# Patient Record
Sex: FEMALE | Race: WHITE | NOT HISPANIC OR LATINO | Employment: STUDENT | URBAN - METROPOLITAN AREA
[De-identification: names, ages, dates, MRNs, and addresses within clinical notes are randomized per-mention and may not be internally consistent; named-entity substitution may affect disease eponyms.]

---
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
## 2023-01-11 ENCOUNTER — HOSPITAL ENCOUNTER (EMERGENCY)
Facility: HOSPITAL | Age: 18
Discharge: HOME | End: 2023-01-11
Attending: EMERGENCY MEDICINE

## 2023-01-11 ENCOUNTER — TELEPHONE (OUTPATIENT)
Dept: PEDIATRICS CLINIC | Facility: CLINIC | Age: 18
End: 2023-01-11

## 2023-01-11 VITALS
RESPIRATION RATE: 20 BRPM | HEART RATE: 101 BPM | DIASTOLIC BLOOD PRESSURE: 63 MMHG | SYSTOLIC BLOOD PRESSURE: 103 MMHG | OXYGEN SATURATION: 97 %

## 2023-01-11 DIAGNOSIS — F40.10 SOCIAL ANXIETY DISORDER: ICD-10-CM

## 2023-01-11 DIAGNOSIS — Z76.0 MEDICATION REFILL: Primary | ICD-10-CM

## 2023-01-11 DIAGNOSIS — F31.81 BIPOLAR II DISORDER (HCC): ICD-10-CM

## 2023-01-11 DIAGNOSIS — F41.0 PANIC DISORDER: ICD-10-CM

## 2023-01-11 RX ORDER — ESCITALOPRAM OXALATE 10 MG/1
10 TABLET ORAL DAILY
Qty: 30 TABLET | Refills: 0 | Status: SHIPPED | OUTPATIENT
Start: 2023-01-11 | End: 2023-01-11 | Stop reason: SDUPTHER

## 2023-01-11 RX ORDER — ESCITALOPRAM OXALATE 10 MG/1
10 TABLET ORAL DAILY
Qty: 9 TABLET | Refills: 0 | Status: SHIPPED | OUTPATIENT
Start: 2023-01-11 | End: 2023-01-20

## 2023-01-11 NOTE — ED PROVIDER NOTES
History  Chief Complaint   Patient presents with   • Medication Refill     Needs refill for antidepressant, appt on 1/19, escitalopram 10mg daily     47-year-old female comes in for medication refill  Patient states she takes Lexapro 10 mg daily and that she ran out  She is setting up care with a new psychiatrist but does not have an appointment until the 19th  Her old psychiatrist discharged her from the practice in November and is unwilling to refill her scripts  Patient has no other complaints      History provided by:  Patient   used: No    Medication Refill  Medications/supplies requested:  Lexapro  Reason for request:  Medications ran out (Changing psychiatrists)  Medications taken before: yes - see home medications    Patient has complete original prescription information: yes        Prior to Admission Medications   Prescriptions Last Dose Informant Patient Reported? Taking? OLANZapine (ZyPREXA) 10 mg tablet   No No   Sig: Take 1 tablet (10 mg total) by mouth daily at bedtime   SODIUM FLUORIDE, DENTAL GEL, 1 1 % GEL   No No   Sig: Brush teeth using size of small pea every night - spit out toothpaste - do not rinse following brushing - use with adult supervision     escitalopram (Lexapro) 10 mg tablet   No No   Sig: Take 1 tablet (10 mg total) by mouth daily   escitalopram (Lexapro) 10 mg tablet   No Yes   Sig: Take 1 tablet (10 mg total) by mouth daily for 9 days   escitalopram (Lexapro) 10 mg tablet   No Yes   Sig: Take 1 tablet (10 mg total) by mouth daily for 9 days   ferrous sulfate 324 (65 Fe) mg   No No   Sig: Take 1 tablet (324 mg total) by mouth 2 (two) times a day before meals   hydrOXYzine pamoate (VISTARIL) 50 mg capsule   Yes No   traZODone (DESYREL) 50 mg tablet   No No   Sig: Take 1 tablet (50 mg total) by mouth daily at bedtime      Facility-Administered Medications: None       Past Medical History:   Diagnosis Date   • Anemia    • Anxiety    • Depression    • Frequent headaches 05/20/2021       No past surgical history on file  Family History   Problem Relation Age of Onset   • No Known Problems Mother    • No Known Problems Father      I have reviewed and agree with the history as documented  E-Cigarette/Vaping   • E-Cigarette Use Never User      E-Cigarette/Vaping Substances   • Nicotine No    • THC No    • CBD No    • Flavoring No    • Other No    • Unknown No      Social History     Tobacco Use   • Smoking status: Every Day     Packs/day: 0 50     Types: Cigarettes   • Smokeless tobacco: Never   • Tobacco comments:     Dealing with a lot of anxiety  Refer to [address] Use   • Vaping Use: Never used   Substance Use Topics   • Alcohol use: Never   • Drug use: Never       Review of Systems   Constitutional: Negative for chills and fever  HENT: Negative for ear pain and sore throat  Eyes: Negative for pain and visual disturbance  Respiratory: Negative for cough and shortness of breath  Cardiovascular: Negative for chest pain and palpitations  Gastrointestinal: Negative for abdominal pain and vomiting  Genitourinary: Negative for dysuria and hematuria  Musculoskeletal: Negative for arthralgias and back pain  Skin: Negative for color change and rash  Neurological: Negative for seizures and syncope  All other systems reviewed and are negative  Physical Exam  Physical Exam  Vitals and nursing note reviewed  Constitutional:       General: She is not in acute distress  Appearance: She is well-developed  HENT:      Head: Normocephalic and atraumatic  Eyes:      Conjunctiva/sclera: Conjunctivae normal    Cardiovascular:      Rate and Rhythm: Normal rate and regular rhythm  Heart sounds: No murmur heard  Pulmonary:      Effort: Pulmonary effort is normal  No respiratory distress  Breath sounds: Normal breath sounds  Abdominal:      Palpations: Abdomen is soft  Tenderness: There is no abdominal tenderness  Musculoskeletal:         General: No swelling  Cervical back: Neck supple  Skin:     General: Skin is warm and dry  Capillary Refill: Capillary refill takes less than 2 seconds  Neurological:      Mental Status: She is alert  Psychiatric:         Mood and Affect: Mood normal          Vital Signs  ED Triage Vitals   Temp Pulse Respirations Blood Pressure SpO2   -- 01/11/23 1732 01/11/23 1732 01/11/23 1732 01/11/23 1732    101 20 103/63 97 %      Temp Source Heart Rate Source Patient Position - Orthostatic VS BP Location FiO2 (%)   01/11/23 1731 01/11/23 1732 01/11/23 1732 01/11/23 1732 --   Oral Monitor Sitting Left arm       Pain Score       01/11/23 1732       No Pain           Vitals:    01/11/23 1732   BP: 103/63   Pulse: 101   Patient Position - Orthostatic VS: Sitting         Visual Acuity      ED Medications  Medications - No data to display    Diagnostic Studies  Results Reviewed     None                 No orders to display              Procedures  Procedures         ED Course                                             Medical Decision Making  Bipolar II disorder [facility]): chronic illness or injury  Medication refill: self-limited or minor problem  Panic disorder: chronic illness or injury  Social anxiety disorder: chronic illness or injury  Amount and/or Complexity of Data Reviewed  Independent Historian: parent     Details: Parent is here to corroborate patient's story that she takes her medications regularly and that she ran out and she did try to call her former psychiatrist who would not refill her medication   External Data Reviewed: notes  Details: Discharge from current psychiatrist 11 of 2022  Patient noted to have tried to call to get refills for her medication before her visit with new psychiatrist and they refused to refill because she was no longer patient our practice      Risk  Prescription drug management      Risk Details: Wrote prescription for Lexapro until 1/20/2023 discussed with patient and mother to keep her point with new psychiatrist and if she had anything other concern she could come back to the emergency department  Disposition  Final diagnoses:   Medication refill   Bipolar II disorder ([facility] [address] )   Social anxiety disorder   Panic disorder     Time reflects when diagnosis was documented in both MDM as applicable and the Disposition within this note     Time User Action Codes Description Comment    1/11/2023  5:41 PM Gabriella Kowalski K Add [Z76 0] Medication refill     1/11/2023  5:41 PM Shanna Fairbanks K Add [F31 81] Bipolar II disorder ([facility] [address] )     1/11/2023  5:41 PM Daija Ishikawa Add [F40 10] Social anxiety disorder     1/11/2023  5:41 PM Daija Ishikawa Add [F41 0] Panic disorder       ED Disposition     ED Disposition   Discharge    Condition   Stable    Date/Time   Wed Jan 11, 2023  5:41 PM    [facility] discharge to home/self care                 Follow-up Information     Follow up With Specialties Details Why Contact Info    Keep appointment with your psychiatrist              Discharge Medication List as of 1/11/2023  5:47 PM      CONTINUE these medications which have CHANGED    Details   escitalopram (Lexapro) 10 mg tablet Take 1 tablet (10 mg total) by mouth daily for 9 days, Starting Wed 1/11/2023, Until Fri 1/20/2023, Normal         CONTINUE these medications which have NOT CHANGED    Details   ferrous sulfate 324 (65 Fe) mg Take 1 tablet (324 mg total) by mouth 2 (two) times a day before meals, Starting Sat 5/15/2021, Normal      hydrOXYzine pamoate (VISTARIL) 50 mg capsule Starting Sat 11/6/2021, Historical Med      OLANZapine (ZyPREXA) 10 mg tablet Take 1 tablet (10 mg total) by mouth daily at bedtime, Starting Thu 9/22/2022, Until Wed 12/21/2022, Normal      SODIUM FLUORIDE, DENTAL GEL, 1 1 % GEL Brush teeth using size of small pea every night - spit out toothpaste - do not rinse following brushing - use with adult supervision , Normal traZODone (DESYREL) 50 mg tablet Take 1 tablet (50 mg total) by mouth daily at bedtime, Starting Thu 9/22/2022, Until Wed 12/21/2022, Normal             No discharge procedures on file      PDMP Review     None          ED Provider  Electronically Signed by           Suad Arboleda DO  01/11/23 1851

## 2023-01-19 ENCOUNTER — TELEPHONE (OUTPATIENT)
Dept: PSYCHIATRY | Facility: CLINIC | Age: 18
End: 2023-01-19

## 2023-01-19 NOTE — TELEPHONE ENCOUNTER
Pt mother called in to add pt to wait list for med mgmt  Pt was recently discharged from a provider but pt has since been having trouble sleeping and experiencing bouts of depression  Writer informed mom of wait list and to contact pcp for a referral for services  Writer also provided mom with the walk in clinic address and phone number as mom stated she feels she needs to take pt to the hospital and is concerned about her   Mom was appreciative of the information and will obtain the referral

## 2023-01-26 NOTE — TELEPHONE ENCOUNTER
01/26/23 1:00 PM     The office's request has been received, reviewed, and the patient chart updated  The PCP has successfully been removed with a patient attribution note  This message will now be completed      Thank you  Paolo Smith

## 2023-02-03 ENCOUNTER — HOSPITAL ENCOUNTER (EMERGENCY)
Facility: HOSPITAL | Age: 18
Discharge: HOME | End: 2023-02-03
Attending: EMERGENCY MEDICINE

## 2023-02-03 ENCOUNTER — APPOINTMENT (EMERGENCY)
Dept: RADIOLOGY | Facility: HOSPITAL | Age: 18
End: 2023-02-03

## 2023-02-03 VITALS
HEART RATE: 76 BPM | BODY MASS INDEX: 28.55 KG/M2 | HEIGHT: 61 IN | TEMPERATURE: 98.6 F | RESPIRATION RATE: 18 BRPM | OXYGEN SATURATION: 100 % | DIASTOLIC BLOOD PRESSURE: 58 MMHG | WEIGHT: 151.24 LBS | SYSTOLIC BLOOD PRESSURE: 101 MMHG

## 2023-02-03 DIAGNOSIS — R11.2 NAUSEA AND VOMITING: Primary | ICD-10-CM

## 2023-02-03 LAB
ABO GROUP BLD: NORMAL
ALBUMIN SERPL BCP-MCNC: 4.3 G/DL (ref 3.5–5)
ALP SERPL-CCNC: 62 U/L (ref 34–104)
ALT SERPL W P-5'-P-CCNC: 10 U/L (ref 7–52)
ANION GAP SERPL CALCULATED.3IONS-SCNC: 6 MMOL/L (ref 4–13)
APTT PPP: 31 SECONDS (ref 23–37)
AST SERPL W P-5'-P-CCNC: 11 U/L (ref 13–39)
BASOPHILS # BLD AUTO: 0.04 THOUSANDS/ÂΜL (ref 0–0.1)
BASOPHILS NFR BLD AUTO: 0 % (ref 0–1)
BILIRUB SERPL-MCNC: 0.61 MG/DL (ref 0.2–1)
BLD GP AB SCN SERPL QL: NEGATIVE
BUN SERPL-MCNC: 7 MG/DL (ref 5–25)
CALCIUM SERPL-MCNC: 9.3 MG/DL (ref 8.4–10.2)
CHLORIDE SERPL-SCNC: 105 MMOL/L (ref 96–108)
CO2 SERPL-SCNC: 24 MMOL/L (ref 21–32)
CREAT SERPL-MCNC: 0.6 MG/DL (ref 0.6–1.3)
EOSINOPHIL # BLD AUTO: 0.11 THOUSAND/ÂΜL (ref 0–0.61)
EOSINOPHIL NFR BLD AUTO: 1 % (ref 0–6)
ERYTHROCYTE [DISTWIDTH] IN BLOOD BY AUTOMATED COUNT: 14.6 % (ref 11.6–15.1)
GFR SERPL CREATININE-BSD FRML MDRD: 132 ML/MIN/1.73SQ M
GLUCOSE SERPL-MCNC: 84 MG/DL (ref 65–140)
HCT VFR BLD AUTO: 33.3 % (ref 34.8–46.1)
HGB BLD-MCNC: 10 G/DL (ref 11.5–15.4)
IMM GRANULOCYTES # BLD AUTO: 0.03 THOUSAND/UL (ref 0–0.2)
IMM GRANULOCYTES NFR BLD AUTO: 0 % (ref 0–2)
INR PPP: 1.03 (ref 0.84–1.19)
LIPASE SERPL-CCNC: 6 U/L (ref 11–82)
LYMPHOCYTES # BLD AUTO: 2.19 THOUSANDS/ÂΜL (ref 0.6–4.47)
LYMPHOCYTES NFR BLD AUTO: 24 % (ref 14–44)
MCH RBC QN AUTO: 22.6 PG (ref 26.8–34.3)
MCHC RBC AUTO-ENTMCNC: 30 G/DL (ref 31.4–37.4)
MCV RBC AUTO: 75 FL (ref 82–98)
MONOCYTES # BLD AUTO: 0.96 THOUSAND/ÂΜL (ref 0.17–1.22)
MONOCYTES NFR BLD AUTO: 10 % (ref 4–12)
NEUTROPHILS # BLD AUTO: 5.88 THOUSANDS/ÂΜL (ref 1.85–7.62)
NEUTS SEG NFR BLD AUTO: 65 % (ref 43–75)
NRBC BLD AUTO-RTO: 0 /100 WBCS
PLATELET # BLD AUTO: 330 THOUSANDS/UL (ref 149–390)
PMV BLD AUTO: 10.2 FL (ref 8.9–12.7)
POTASSIUM SERPL-SCNC: 3.6 MMOL/L (ref 3.5–5.3)
PROT SERPL-MCNC: 7.3 G/DL (ref 6.4–8.4)
PROTHROMBIN TIME: 13.7 SECONDS (ref 11.6–14.5)
RBC # BLD AUTO: 4.42 MILLION/UL (ref 3.81–5.12)
RH BLD: POSITIVE
SODIUM SERPL-SCNC: 135 MMOL/L (ref 135–147)
SPECIMEN EXPIRATION DATE: NORMAL
WBC # BLD AUTO: 9.21 THOUSAND/UL (ref 4.31–10.16)

## 2023-02-03 RX ORDER — ONDANSETRON 4 MG/1
4 TABLET, ORALLY DISINTEGRATING ORAL EVERY 8 HOURS PRN
Qty: 20 TABLET | Refills: 0 | Status: SHIPPED | OUTPATIENT
Start: 2023-02-03 | End: 2023-02-10

## 2023-02-03 RX ORDER — ONDANSETRON 2 MG/ML
4 INJECTION INTRAMUSCULAR; INTRAVENOUS ONCE
Status: COMPLETED | OUTPATIENT
Start: 2023-02-03 | End: 2023-02-03

## 2023-02-03 RX ORDER — PANTOPRAZOLE SODIUM 40 MG/10ML
40 INJECTION, POWDER, LYOPHILIZED, FOR SOLUTION INTRAVENOUS ONCE
Status: COMPLETED | OUTPATIENT
Start: 2023-02-03 | End: 2023-02-03

## 2023-02-03 RX ADMIN — ONDANSETRON 4 MG: 2 INJECTION INTRAMUSCULAR; INTRAVENOUS at 22:07

## 2023-02-03 RX ADMIN — SODIUM CHLORIDE 1000 ML: 0.9 INJECTION, SOLUTION INTRAVENOUS at 22:14

## 2023-02-03 RX ADMIN — PANTOPRAZOLE SODIUM 40 MG: 40 INJECTION, POWDER, FOR SOLUTION INTRAVENOUS at 22:11

## 2023-02-04 LAB
ATRIAL RATE: 104 BPM
FERRITIN SERPL-MCNC: 2 NG/ML (ref 8–388)
IRON SATN MFR SERPL: 6 % (ref 15–50)
IRON SERPL-MCNC: 26 UG/DL (ref 50–170)
P AXIS: 64 DEGREES
PR INTERVAL: 136 MS
QRS AXIS: 89 DEGREES
QRSD INTERVAL: 84 MS
QT INTERVAL: 316 MS
QTC INTERVAL: 415 MS
T WAVE AXIS: 43 DEGREES
TIBC SERPL-MCNC: 465 UG/DL (ref 250–450)
VENTRICULAR RATE: 104 BPM

## 2023-02-04 NOTE — ED PROVIDER NOTES
History  Chief Complaint   Patient presents with   • Vomiting     Pt complains of vomiting for the last 2 months  Pt was vomiting today and mother states that she saw dark red at the end of the vomiting episode  Cortes Braxton is a 21year old female presenting for evaluation after she had an episode of hematemesis  Her mother is present and helped provide some of her history  They describe that the patient has had intermittent episodes of vomiting over the last several weeks, she is also been complaining of reflux and epigastric abdominal discomfort  She has been taking Tums and over-the-counter antacids without relief  She has previously had streaks of blood in her vomit, however today she had an episode of dark red vomit, she denies eating any red foods or drinking any red drinks today  She denies any significant epigastric discomfort at this time  No recent dark or bloody stools  She has a history of anemia from unknown cause that required blood transfusion  Patient does endorse taking NSAIDs daily for dental pain  History provided by:  Patient and relative (Mother)   used: No    Vomiting  Associated symptoms: no abdominal pain, no arthralgias, no chills, no cough, no fever and no sore throat        Prior to Admission Medications   Prescriptions Last Dose Informant Patient Reported? Taking? OLANZapine (ZyPREXA) 10 mg tablet   No No   Sig: Take 1 tablet (10 mg total) by mouth daily at bedtime   SODIUM FLUORIDE, DENTAL GEL, 1 1 % GEL   No No   Sig: Brush teeth using size of small pea every night - spit out toothpaste - do not rinse following brushing - use with adult supervision     escitalopram (Lexapro) 10 mg tablet   No No   Sig: Take 1 tablet (10 mg total) by mouth daily for 9 days   ferrous sulfate 324 (65 Fe) mg   No No   Sig: Take 1 tablet (324 mg total) by mouth 2 (two) times a day before meals   hydrOXYzine pamoate (VISTARIL) 50 mg capsule   Yes No   traZODone (DESYREL) 50 mg tablet   No No   Sig: Take 1 tablet (50 mg total) by mouth daily at bedtime      Facility-Administered Medications: None       Past Medical History:   Diagnosis Date   • Anemia    • Anxiety    • Depression    • Frequent headaches 05/20/2021       History reviewed  No pertinent surgical history  Family History   Problem Relation Age of Onset   • No Known Problems Mother    • No Known Problems Father      I have reviewed and agree with the history as documented  E-Cigarette/Vaping   • E-Cigarette Use Never User      E-Cigarette/Vaping Substances   • Nicotine No    • THC No    • CBD No    • Flavoring No    • Other No    • Unknown No      Social History     Tobacco Use   • Smoking status: Every Day     Packs/day: 1 00     Types: Cigarettes   • Smokeless tobacco: Never   • Tobacco comments:     Dealing with a lot of anxiety  Refer to [address] Use   • Vaping Use: Never used   Substance Use Topics   • Alcohol use: Never   • Drug use: Never        Review of Systems   Constitutional: Negative for chills and fever  HENT: Negative for ear pain and sore throat  Eyes: Negative for pain and visual disturbance  Respiratory: Negative for cough and shortness of breath  Cardiovascular: Negative for chest pain and palpitations  Gastrointestinal: Positive for nausea and vomiting  Negative for abdominal pain  Vomiting blood   Genitourinary: Negative for dysuria and hematuria  Musculoskeletal: Negative for arthralgias and back pain  Skin: Negative for color change and rash  Neurological: Negative for seizures and syncope  All other systems reviewed and are negative        Physical Exam  ED Triage Vitals [02/03/23 2043]   Temperature Pulse Respirations Blood Pressure SpO2   98 6 °F (37 °C) (!) 115 18 132/80 97 %      Temp Source Heart Rate Source Patient Position - Orthostatic VS BP Location FiO2 (%)   Oral -- Lying Right arm --      Pain Score       --             Orthostatic Vital Signs  Vitals:    02/03/23 2043   BP: 132/80   Pulse: (!) 115   Patient Position - Orthostatic VS: Lying       Physical Exam  Vitals and nursing note reviewed  Constitutional:       General: She is not in acute distress  Appearance: Normal appearance  HENT:      Head: Normocephalic and atraumatic  Mouth/Throat:      Mouth: Mucous membranes are moist       Pharynx: Oropharynx is clear  Eyes:      General: No scleral icterus  Conjunctiva/sclera: Conjunctivae normal    Cardiovascular:      Rate and Rhythm: Normal rate and regular rhythm  Heart sounds: Normal heart sounds  Pulmonary:      Effort: Pulmonary effort is normal  No respiratory distress  Breath sounds: Normal breath sounds  Abdominal:      General: Abdomen is flat  There is no distension  Palpations: Abdomen is soft  Tenderness: There is no abdominal tenderness  There is no guarding or rebound  Musculoskeletal:         General: No tenderness or signs of injury  Cervical back: Neck supple  No rigidity  Skin:     General: Skin is warm  Coloration: Skin is not jaundiced  Findings: No erythema or rash  Neurological:      General: No focal deficit present  Mental Status: She is alert  Mental status is at baseline  Psychiatric:         Mood and Affect: Mood normal          Behavior: Behavior normal          ED Medications  Medications   sodium chloride 0 9 % bolus 1,000 mL (1,000 mL Intravenous New Bag 2/3/23 2214)   ondansetron (ZOFRAN) injection 4 mg (4 mg Intravenous Given 2/3/23 2207)   pantoprazole (PROTONIX) injection 40 mg (40 mg Intravenous Given 2/3/23 2211)       Diagnostic Studies  Results Reviewed     Procedure Component Value Units Date/Time    Iron Saturation % [[id number]] Collected: 02/03/23 2214    Lab Status: In process Specimen: Blood from Arm, Right Updated: 02/03/23 2257    Ferritin [[id number]] Collected: 02/03/23 2214    Lab Status:  In process Specimen: Blood from Arm, Right Updated: 02/03/23 2257    Comprehensive metabolic panel [[id number]]  (Abnormal) Collected: 02/03/23 2214    Lab Status: Final result Specimen: Blood from Arm, Right Updated: 02/03/23 2246     Sodium 135 mmol/L      Potassium 3 6 mmol/L      Chloride 105 mmol/L      CO2 24 mmol/L      ANION GAP 6 mmol/L      BUN 7 mg/dL      Creatinine 0 60 mg/dL      Glucose 84 mg/dL      Calcium 9 3 mg/dL      AST 11 U/L      ALT 10 U/L      Alkaline Phosphatase 62 U/L      Total Protein 7 3 g/dL      Albumin 4 3 g/dL      Total Bilirubin 0 61 mg/dL      eGFR 132 ml/min/1 73sq m     Narrative:      [place] guidelines for Chronic Kidney Disease (CKD):   •  Stage 1 with normal or high GFR (GFR > 90 mL/min/1 73 square meters)  •  Stage 2 Mild CKD (GFR = 60-89 mL/min/1 73 square meters)  •  Stage 3A Moderate CKD (GFR = 45-59 mL/min/1 73 square meters)  •  Stage 3B Moderate CKD (GFR = 30-44 mL/min/1 73 square meters)  •  Stage 4 Severe CKD (GFR = 15-29 mL/min/1 73 square meters)  •  Stage 5 End Stage CKD (GFR <15 mL/min/1 73 square meters)  Note: GFR calculation is accurate only with a steady state creatinine    Lipase [[id number]]  (Abnormal) Collected: 02/03/23 2214    Lab Status: Final result Specimen: Blood from Arm, Right Updated: 02/03/23 2246     Lipase 6 u/L     Protime-INR [[id number]]  (Normal) Collected: 02/03/23 2214    Lab Status: Final result Specimen: Blood from Arm, Left Updated: 02/03/23 2239     Protime 13 7 seconds      INR 1 03    APTT [[id number]]  (Normal) Collected: 02/03/23 2214    Lab Status: Final result Specimen: Blood from Arm, Left Updated: 02/03/23 2239     PTT 31 seconds     CBC and differential [[id number]]  (Abnormal) Collected: 02/03/23 2214    Lab Status: Final result Specimen: Blood from Arm, Right Updated: 02/03/23 2226     WBC 9 21 Thousand/uL      RBC 4 42 Million/uL      Hemoglobin 10 0 g/dL      Hematocrit 33 3 %      MCV 75 fL      MCH 22 6 pg      MCHC 30 0 g/dL      RDW 14 6 %      MPV 10 2 fL      Platelets 781 Thousands/uL      nRBC 0 /100 WBCs      Neutrophils Relative 65 %      Immat GRANS % 0 %      Lymphocytes Relative 24 %      Monocytes Relative 10 %      Eosinophils Relative 1 %      Basophils Relative 0 %      Neutrophils Absolute 5 88 Thousands/µL      Immature Grans Absolute 0 03 Thousand/uL      Lymphocytes Absolute 2 19 Thousands/µL      Monocytes Absolute 0 96 Thousand/µL      Eosinophils Absolute 0 11 Thousand/µL      Basophils Absolute 0 04 Thousands/µL                  XR chest 1 view portable   ED Interpretation by Nicole Grider DO (02/03 2251)   No acute abnormalities visualized            Procedures  Procedures      ED Course                                       Medical Decision Making  Shahrzad Crane is a 21year old female presenting for evaluation after she had an episode of hematemesis  They describe that she has had intermittent vomiting over the last several weeks, today she had an episode of dark red vomitus  She denies any abdominal tenderness at this time, but has had intermittent epigastric tenderness  Patient was not in any significant distress on examination, no abdominal tenderness, no active vomiting in the emergency department  Vital signs were unremarkable  Concern for possible peptic ulcer disease, possible Yue-Solorio tear  Patient's blood work was unremarkable, hemoglobin level was at her baseline at 10  Rest of her blood work was unremarkable  Chest x-ray was unremarkable, no evidence of free air or pneumomediastinum  Patient was given a dose of Protonix and Zofran here in the emergency department  Patient advised to schedule a follow-up appointment with her family medicine doctor, patient discharged with a prescription of Zofran for persistent nausea and vomiting, I advised that they discuss possible GI follow-up with her family doctor, gave strict return precautions, they were agreeable to plan      Nausea and vomiting: acute illness or injury  Amount and/or Complexity of Data Reviewed  Labs: ordered  Radiology: ordered and independent interpretation performed  Risk  Prescription drug management  Disposition  Final diagnoses:   Nausea and vomiting     Time reflects when diagnosis was documented in both MDM as applicable and the Disposition within this note     Time User Action Codes Description Comment    2/3/2023 10:55 PM Lilly Otero Add [R11 2] Nausea and vomiting       ED Disposition     ED Disposition   Discharge    Condition   Stable    Date/Time   Fri Feb 3, 2023 10:55 PM    [facility] discharge to home/self care  Follow-up Information     Follow up With Specialties Details Why Contact Info [facility] Schedule an appointment as soon as possible for a visit   [address] [address]  [id number]  , [place], [place], [address]          Patient's Medications   Discharge Prescriptions    ONDANSETRON (ZOFRAN-ODT) 4 MG DISINTEGRATING TABLET    Take 1 tablet (4 mg total) by mouth every 8 (eight) hours as needed for nausea or vomiting for up to 7 days       Start Date: 2/3/2023  End Date: 2/10/2023       Order Dose: 4 mg       Quantity: 20 tablet    Refills: 0     No discharge procedures on file  PDMP Review     None           ED Provider  Attending physically available and evaluated Papikristi Ahujah  I managed the patient along with the ED Attending      Electronically Signed by         Howie Mendoza DO  02/03/23 3179 clear bilaterally.  Pupils equal, round, and reactive to light.

## 2023-08-20 NOTE — ED NOTES
Spoke to patients mother via phone, gave verbal consent to treat patient  Mother is on her way here        Durga Barrett RN  06/15/21 8221  used

## 2023-10-03 ENCOUNTER — HOSPITAL ENCOUNTER (EMERGENCY)
Facility: HOSPITAL | Age: 18
Discharge: HOME | End: 2023-10-03
Attending: EMERGENCY MEDICINE
Payer: COMMERCIAL

## 2023-10-03 VITALS
TEMPERATURE: 98.8 F | SYSTOLIC BLOOD PRESSURE: 120 MMHG | RESPIRATION RATE: 18 BRPM | DIASTOLIC BLOOD PRESSURE: 65 MMHG | HEART RATE: 87 BPM | OXYGEN SATURATION: 97 %

## 2023-10-03 DIAGNOSIS — D64.9 ANEMIA: ICD-10-CM

## 2023-10-03 DIAGNOSIS — K92.0 HEMATEMESIS: ICD-10-CM

## 2023-10-03 DIAGNOSIS — K92.2 UPPER GI BLEED: Primary | ICD-10-CM

## 2023-10-03 LAB
ABO GROUP BLD: NORMAL
ALBUMIN SERPL BCP-MCNC: 4.9 G/DL (ref 3.5–5)
ALP SERPL-CCNC: 62 U/L (ref 34–104)
ALT SERPL W P-5'-P-CCNC: 9 U/L (ref 7–52)
ANION GAP SERPL CALCULATED.3IONS-SCNC: 8 MMOL/L
AST SERPL W P-5'-P-CCNC: 11 U/L (ref 13–39)
BASOPHILS # BLD AUTO: 0.04 THOUSANDS/ÂΜL (ref 0–0.1)
BASOPHILS NFR BLD AUTO: 0 % (ref 0–1)
BILIRUB SERPL-MCNC: 0.83 MG/DL (ref 0.2–1)
BLD GP AB SCN SERPL QL: NEGATIVE
BUN SERPL-MCNC: 8 MG/DL (ref 5–25)
CALCIUM SERPL-MCNC: 9.4 MG/DL (ref 8.4–10.2)
CHLORIDE SERPL-SCNC: 104 MMOL/L (ref 96–108)
CO2 SERPL-SCNC: 25 MMOL/L (ref 21–32)
CREAT SERPL-MCNC: 0.66 MG/DL (ref 0.6–1.3)
EOSINOPHIL # BLD AUTO: 0.04 THOUSAND/ÂΜL (ref 0–0.61)
EOSINOPHIL NFR BLD AUTO: 0 % (ref 0–6)
ERYTHROCYTE [DISTWIDTH] IN BLOOD BY AUTOMATED COUNT: 14.9 % (ref 11.6–15.1)
GFR SERPL CREATININE-BSD FRML MDRD: 128 ML/MIN/1.73SQ M
GLUCOSE SERPL-MCNC: 91 MG/DL (ref 65–140)
HCT VFR BLD AUTO: 34.4 % (ref 34.8–46.1)
HGB BLD-MCNC: 10.2 G/DL (ref 11.5–15.4)
IMM GRANULOCYTES # BLD AUTO: 0.05 THOUSAND/UL (ref 0–0.2)
IMM GRANULOCYTES NFR BLD AUTO: 0 % (ref 0–2)
LIPASE SERPL-CCNC: <6 U/L (ref 11–82)
LYMPHOCYTES # BLD AUTO: 2.16 THOUSANDS/ÂΜL (ref 0.6–4.47)
LYMPHOCYTES NFR BLD AUTO: 19 % (ref 14–44)
MCH RBC QN AUTO: 22.7 PG (ref 26.8–34.3)
MCHC RBC AUTO-ENTMCNC: 29.7 G/DL (ref 31.4–37.4)
MCV RBC AUTO: 77 FL (ref 82–98)
MONOCYTES # BLD AUTO: 0.9 THOUSAND/ÂΜL (ref 0.17–1.22)
MONOCYTES NFR BLD AUTO: 8 % (ref 4–12)
NEUTROPHILS # BLD AUTO: 8.08 THOUSANDS/ÂΜL (ref 1.85–7.62)
NEUTS SEG NFR BLD AUTO: 73 % (ref 43–75)
NRBC BLD AUTO-RTO: 0 /100 WBCS
PLATELET # BLD AUTO: 295 THOUSANDS/UL (ref 149–390)
PMV BLD AUTO: 9.9 FL (ref 8.9–12.7)
POTASSIUM SERPL-SCNC: 3.7 MMOL/L (ref 3.5–5.3)
PROT SERPL-MCNC: 7.8 G/DL (ref 6.4–8.4)
RBC # BLD AUTO: 4.49 MILLION/UL (ref 3.81–5.12)
RH BLD: POSITIVE
SODIUM SERPL-SCNC: 137 MMOL/L (ref 135–147)
SPECIMEN EXPIRATION DATE: NORMAL
WBC # BLD AUTO: 11.27 THOUSAND/UL (ref 4.31–10.16)

## 2023-10-03 PROCEDURE — 99285 EMERGENCY DEPT VISIT HI MDM: CPT | Performed by: EMERGENCY MEDICINE

## 2023-10-03 PROCEDURE — 86850 RBC ANTIBODY SCREEN: CPT | Performed by: EMERGENCY MEDICINE

## 2023-10-03 PROCEDURE — 85025 COMPLETE CBC W/AUTO DIFF WBC: CPT | Performed by: EMERGENCY MEDICINE

## 2023-10-03 PROCEDURE — 99284 EMERGENCY DEPT VISIT MOD MDM: CPT

## 2023-10-03 PROCEDURE — 86900 BLOOD TYPING SEROLOGIC ABO: CPT | Performed by: EMERGENCY MEDICINE

## 2023-10-03 PROCEDURE — 86901 BLOOD TYPING SEROLOGIC RH(D): CPT | Performed by: EMERGENCY MEDICINE

## 2023-10-03 PROCEDURE — 80053 COMPREHEN METABOLIC PANEL: CPT | Performed by: EMERGENCY MEDICINE

## 2023-10-03 PROCEDURE — 36415 COLL VENOUS BLD VENIPUNCTURE: CPT | Performed by: EMERGENCY MEDICINE

## 2023-10-03 PROCEDURE — 83690 ASSAY OF LIPASE: CPT | Performed by: EMERGENCY MEDICINE

## 2023-10-03 RX ORDER — FAMOTIDINE 20 MG/1
20 TABLET, FILM COATED ORAL ONCE
Status: COMPLETED | OUTPATIENT
Start: 2023-10-03 | End: 2023-10-03

## 2023-10-03 RX ORDER — SUCRALFATE ORAL 1 G/10ML
1 SUSPENSION ORAL ONCE
Status: DISCONTINUED | OUTPATIENT
Start: 2023-10-03 | End: 2023-10-03

## 2023-10-03 RX ORDER — SUCRALFATE 1 G/1
1 TABLET ORAL ONCE
Status: COMPLETED | OUTPATIENT
Start: 2023-10-03 | End: 2023-10-03

## 2023-10-03 RX ORDER — FERROUS SULFATE 325(65) MG
325 TABLET ORAL DAILY
Qty: 30 TABLET | Refills: 0 | Status: SHIPPED | OUTPATIENT
Start: 2023-10-03

## 2023-10-03 RX ADMIN — SUCRALFATE 1 G: 1 TABLET ORAL at 19:54

## 2023-10-03 RX ADMIN — FAMOTIDINE 20 MG: 20 TABLET ORAL at 19:53

## 2023-10-03 NOTE — ED PROVIDER NOTES
History  Chief Complaint   Patient presents with   • Vomiting Blood     Patient reports vomiting for the last few months, worsening and becoming more often, every day or other day. Today patient had one episode of vomiting blood. The patient is a 21 year olf female who presents with complaint of hematemesis. Pt states today she had an episode of vomiting with a "glob" of bright red blood. She states she has been having increasingly frequent episodes of vomiting for a few months and that vomiting is occurring more days of the week than not now. She has noticed some streaks or flecks of blood in the vomit recently but today was a larger amount. She was seen in the ED several months back but she has not had insurance for a while so she has not been able to seek further work up. She has used Tums, prevacid, and pepto without much improvement of sx. She states she has noticed some heart burn with the vomiting and has noticed overeating and certain foods like elisabeth and tomatoes make it worse. She has attempted an alkaline diet but was not able to stick with it very long. She notes history of anemia and was previously on iron supplements but no longer has a prescription for them. Denies black or bloody stools, chest pain, SOB          Prior to Admission Medications   Prescriptions Last Dose Informant Patient Reported? Taking? OLANZapine (ZyPREXA) 10 mg tablet   No No   Sig: Take 1 tablet (10 mg total) by mouth daily at bedtime   SODIUM FLUORIDE, DENTAL GEL, 1.1 % GEL   No No   Sig: Brush teeth using size of small pea every night - spit out toothpaste - do not rinse following brushing - use with adult supervision.    escitalopram (Lexapro) 10 mg tablet   No No   Sig: Take 1 tablet (10 mg total) by mouth daily for 9 days   ferrous sulfate 324 (65 Fe) mg   No No   Sig: Take 1 tablet (324 mg total) by mouth 2 (two) times a day before meals   hydrOXYzine pamoate (VISTARIL) 50 mg capsule   Yes No   ondansetron (ZOFRAN-ODT) 4 mg disintegrating tablet   No No   Sig: Take 1 tablet (4 mg total) by mouth every 8 (eight) hours as needed for nausea or vomiting for up to 7 days   traZODone (DESYREL) 50 mg tablet   No No   Sig: Take 1 tablet (50 mg total) by mouth daily at bedtime      Facility-Administered Medications: None       Past Medical History:   Diagnosis Date   • Anemia    • Anxiety    • Depression    • Frequent headaches 05/20/2021       History reviewed. No pertinent surgical history. Family History   Problem Relation Age of Onset   • No Known Problems Mother    • No Known Problems Father      I have reviewed and agree with the history as documented. E-Cigarette/Vaping   • E-Cigarette Use Never User      E-Cigarette/Vaping Substances   • Nicotine No    • THC No    • CBD No    • Flavoring No    • Other No    • Unknown No      Social History     Tobacco Use   • Smoking status: Every Day     Packs/day: 1.00     Types: Cigarettes   • Smokeless tobacco: Never   • Tobacco comments:     Dealing with a lot of anxiety. Refer to [address] [place] [address] Dr Use   • Vaping Use: Never used   Substance Use Topics   • Alcohol use: Never   • Drug use: Never        Review of Systems   Constitutional: Negative for chills and fever. HENT: Negative for congestion, rhinorrhea, sore throat and trouble swallowing. Eyes: Negative for discharge and visual disturbance. Respiratory: Positive for cough. Negative for chest tightness and shortness of breath. Cardiovascular: Negative for chest pain and leg swelling. Gastrointestinal: Positive for nausea and vomiting. Negative for abdominal pain, blood in stool and diarrhea. Genitourinary: Negative for difficulty urinating, dyspareunia, hematuria, menstrual problem and vaginal bleeding. Musculoskeletal: Negative for joint swelling and myalgias. Skin: Positive for pallor. Negative for wound. Allergic/Immunologic: Negative for environmental allergies and food allergies.    Neurological: Positive for light-headedness. Negative for syncope, weakness, numbness and headaches. Physical Exam  ED Triage Vitals [10/03/23 1755]   Temperature Pulse Respirations Blood Pressure SpO2   98.8 °F (37.1 °C) 87 18 120/65 97 %      Temp Source Heart Rate Source Patient Position - Orthostatic VS BP Location FiO2 (%)   Oral Monitor Sitting Right arm --      Pain Score       --             Orthostatic Vital Signs  Vitals:    10/03/23 1755   BP: 120/65   Pulse: 87   Patient Position - Orthostatic VS: Sitting       Physical Exam  Vitals and nursing note reviewed. Constitutional:       General: She is not in acute distress. Appearance: She is not toxic-appearing. HENT:      Head: Normocephalic and atraumatic. Mouth/Throat:      Mouth: Mucous membranes are moist.      Pharynx: Oropharynx is clear. Cardiovascular:      Rate and Rhythm: Normal rate and regular rhythm. Pulses: Normal pulses. Heart sounds: Normal heart sounds. Pulmonary:      Effort: Pulmonary effort is normal. No respiratory distress. Breath sounds: Normal breath sounds. No wheezing. Abdominal:      General: Abdomen is flat. Bowel sounds are normal.      Palpations: Abdomen is soft. Tenderness: There is no abdominal tenderness. Musculoskeletal:         General: No swelling or tenderness. Normal range of motion. Right lower leg: No edema. Left lower leg: No edema. Skin:     General: Skin is warm and dry. Coloration: Skin is pale. Neurological:      General: No focal deficit present. Mental Status: She is alert and oriented to person, place, and time.    Psychiatric:         Behavior: Behavior normal.         ED Medications  Medications   famotidine (PEPCID) tablet 20 mg (20 mg Oral Given 10/3/23 1953)   sucralfate (CARAFATE) tablet 1 g (1 g Oral Given 10/3/23 1954)       Diagnostic Studies  Results Reviewed     Procedure Component Value Units Date/Time    Comprehensive metabolic panel [[id number]]  (Abnormal) Collected: 10/03/23 1857    Lab Status: Final result Specimen: Blood from Arm, Left Updated: 10/03/23 1930     Sodium 137 mmol/L      Potassium 3.7 mmol/L      Chloride 104 mmol/L      CO2 25 mmol/L      ANION GAP 8 mmol/L      BUN 8 mg/dL      Creatinine 0.66 mg/dL      Glucose 91 mg/dL      Calcium 9.4 mg/dL      AST 11 U/L      ALT 9 U/L      Alkaline Phosphatase 62 U/L      Total Protein 7.8 g/dL      Albumin 4.9 g/dL      Total Bilirubin 0.83 mg/dL      eGFR 128 ml/min/1.73sq m     Narrative:      [facility] guidelines for Chronic Kid[facility] Disease (CKD):   •  Stage 1 with normal or high GFR (GFR > 90 mL/min/1.73 square meters)  •  Stage 2 Mild CKD (GFR = 60-89 mL/min/1.73 square meters)  •  Stage 3A Moderate CKD (GFR = 45-59 mL/min/1.73 square meters)  •  Stage 3B Moderate CKD (GFR = 30-44 mL/min/1.73 square meters)  •  Stage 4 Severe CKD (GFR = 15-29 mL/min/1.73 square meters)  •  Stage 5 End Stage CKD (GFR <15 mL/min/1.73 square meters)  Note: GFR calculation is accurate only with a steady state creatinine    Lipase [[id number]]  (Abnormal) Collected: 10/03/23 1857    Lab Status: Final result Specimen: Blood from Arm, Left Updated: 10/03/23 1930     Lipase <6 u/L     CBC and differential [[id number]]  (Abnormal) Collected: 10/03/23 1857    Lab Status: Final result Specimen: Blood from Arm, Left Updated: 10/03/23 1912     WBC 11.27 Thousand/uL      RBC 4.49 Million/uL      Hemoglobin 10.2 g/dL      Hematocrit 34.4 %      MCV 77 fL      MCH 22.7 pg      MCHC 29.7 g/dL      RDW 14.9 %      MPV 9.9 fL      Platelets 783 Thousands/uL      nRBC 0 /100 WBCs      Neutrophils Relative 73 %      Immat GRANS % 0 %      Lymphocytes Relative 19 %      Monocytes Relative 8 %      Eosinophils Relative 0 %      Basophils Relative 0 %      Neutrophils Absolute 8.08 Thousands/µL      Immature Grans Absolute 0.05 Thousand/uL      Lymphocytes Absolute 2.16 Thousands/µL      Monocytes Absolute 0.90 Thousand/µL      Eosinophils Absolute 0.04 Thousand/µL      Basophils Absolute 0.04 Thousands/µL                  No orders to display         Procedures  Procedures      ED Course  ED Course as of 10/03/23 2216   Tue Oct 03, 2023   1930 Hemoglobin(!): 10.2  HGB stable from 8 mos ago   1935 Lipase(!)  Stable from 8 months ago   1936 Comprehensive metabolic panel(!)  WNL, AST stable from 8 mos ago. CRAFFT    Flowsheet Row Most Recent Value   CRAFFT Initial Screen: During the past 12 months, did you:    1. Drink any alcohol (more than a few sips)? No Filed at: 10/03/2023 2009   2. Smoke any marijuana or hashish No Filed at: 10/03/2023 2009   3. Use anything else to get high? ("anything else" includes illegal drugs, over the counter and prescription drugs, and things that you sniff or 'huber')? No Filed at: 10/03/2023 2009                                    Medical Decision Making  DDx: Anemia, GERD, gastric ulcer, duodenal ulcer, pancreatitis    Amount and/or Complexity of Data Reviewed  Independent Historian: parent  External Data Reviewed: labs, radiology and notes. Labs: ordered. Decision-making details documented in ED Course. Risk  OTC drugs. Prescription drug management. Disposition  Final diagnoses:   Upper GI bleed   Anemia   Hematemesis     Time reflects when diagnosis was documented in both MDM as applicable and the Disposition within this note     Time User Action Codes Description Comment    10/3/2023  8:14 PM Georgette Matthews [K92.2] Upper GI bleed     10/3/2023  8:21 PM Norman Palomino [D64.9] Anemia     10/3/2023  8:23 PM Miya Vázquez [K92.0] Hematemesis       ED Disposition     ED Disposition   Discharge    Condition   Stable    Date/Time   Tue Oct 3, 2023  8:14 PM    Comment   Donta Moon discharge to home/self care.                Follow-up Information     Follow up With Specialties Details Why Contact Info Additional Information [facility] [place] [facility] [facility]  A referral has been placed for you. If you do not hear from them within 2 days call for an appointment. [facility]  [place] [address] [address] [facility] [place] [address] [facility] [address], [place], [place]    [address] [facility] [address] [facility]  Establish with a primary care doctor for follow up and monitoring of anemia.  [address] [facility] 15 [address] [facility], [address] [place] [address] 200, [place] [address]   [phone]          Discharge Medication List as of 10/3/2023  8:26 PM      START taking these medications    Details   ferrous sulfate 325 (65 Fe) mg tablet Take 1 tablet (325 mg total) by mouth daily, Starting Tue 10/3/2023, Normal         CONTINUE these medications which have NOT CHANGED    Details   escitalopram (Lexapro) 10 mg tablet Take 1 tablet (10 mg total) by mouth daily for 9 days, Starting Wed 1/11/2023, Until Fri 1/20/2023, Normal      ferrous sulfate 324 (65 Fe) mg Take 1 tablet (324 mg total) by mouth 2 (two) times a day before meals, Starting Sat 5/15/2021, Normal      hydrOXYzine pamoate (VISTARIL) 50 mg capsule Starting Sat 11/6/2021, Historical Med      OLANZapine (ZyPREXA) 10 mg tablet Take 1 tablet (10 mg total) by mouth daily at bedtime, Starting Thu 9/22/2022, Until Wed 12/21/2022, Normal      ondansetron (ZOFRAN-ODT) 4 mg disintegrating tablet Take 1 tablet (4 mg total) by mouth every 8 (eight) hours as needed for nausea or vomiting for up to 7 days, Starting Fri 2/3/2023, Until Fri 2/10/2023 at 2359, Normal      SODIUM FLUORIDE, DENTAL GEL, 1.1 % GEL Brush teeth using size of small pea every night - spit out toothpaste - do not rinse following brushing - use with adult supervision., Normal      traZODone (DESYREL) 50 mg tablet Take 1 tablet (50 mg total) by mouth daily at bedtime, Starting Thu 9/22/2022, Until Wed 12/21/2022, Normal               PDMP Review     None           ED Provider  Attending physically available and evaluated Katelynn HudsonFroilan CANTU managed the patient along with the ED Attending.     Electronically Signed by         Brian Willams MD  10/03/23 4420

## 2023-10-31 ENCOUNTER — TELEPHONE (OUTPATIENT)
Dept: PSYCHIATRY | Facility: CLINIC | Age: 18
End: 2023-10-31

## 2023-12-12 ENCOUNTER — OFFICE VISIT (OUTPATIENT)
Dept: GASTROENTEROLOGY | Facility: AMBULARY SURGERY CENTER | Age: 18
End: 2023-12-12
Payer: COMMERCIAL

## 2023-12-12 VITALS
WEIGHT: 158 LBS | HEART RATE: 103 BPM | SYSTOLIC BLOOD PRESSURE: 124 MMHG | DIASTOLIC BLOOD PRESSURE: 62 MMHG | OXYGEN SATURATION: 100 % | HEIGHT: 61 IN | BODY MASS INDEX: 29.83 KG/M2

## 2023-12-12 DIAGNOSIS — K92.2 UPPER GI BLEED: ICD-10-CM

## 2023-12-12 DIAGNOSIS — K59.09 OTHER CONSTIPATION: ICD-10-CM

## 2023-12-12 DIAGNOSIS — D50.8 OTHER IRON DEFICIENCY ANEMIA: ICD-10-CM

## 2023-12-12 DIAGNOSIS — R11.2 NAUSEA AND VOMITING, UNSPECIFIED VOMITING TYPE: Primary | ICD-10-CM

## 2023-12-12 PROCEDURE — 99204 OFFICE O/P NEW MOD 45 MIN: CPT | Performed by: INTERNAL MEDICINE

## 2023-12-12 RX ORDER — CARIPRAZINE 3 MG/1
3 CAPSULE, GELATIN COATED ORAL
COMMUNITY

## 2023-12-12 RX ORDER — BISACODYL 5 MG/1
10 TABLET, DELAYED RELEASE ORAL ONCE
Qty: 2 TABLET | Refills: 0 | Status: SHIPPED | OUTPATIENT
Start: 2023-12-12 | End: 2023-12-12

## 2023-12-12 NOTE — ASSESSMENT & PLAN NOTE
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 Rule out peptic ulcer disease or H. pylori gastritis.    -Patient was explained about the lifestyle and dietary modifications. Advised to avoid fatty foods, chocolates, caffeine, alcohol and any other triggering foods.   Avoid eating for at least 3 hours before going to bed.      -May take Pepcid as needed    -Schedule EGD

## 2023-12-12 NOTE — PATIENT INSTRUCTIONS
Scheduled date of EGD/colonoscopy (as of today): 2/19/24  Physician performing EGD/colonoscopy:  Dr. Vinh Bonds  Location of EGD/colonoscopy: [facility]   Desired bowel prep reviewed with patient:  Golytely   Instructions reviewed with patient by:  Janice DOUGLAS   Clearances:  n/a

## 2023-12-12 NOTE — PROGRESS NOTES
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 Rule out peptic ulcer disease or H. pylori gastritis.    -Patient was explained about the lifestyle and dietary modifications. Advised to avoid fatty foods, chocolates, caffeine, alcohol and any other triggering foods. Avoid eating for at least 3 hours before going to bed.      -May take Pepcid as needed    -Schedule EGD    Other constipation  Secondary to slow colon transit and IBS. Doubt for any significant bowel pathology. -Advised about taking MiraLAX and stool softeners regularly. Increase oral hydration.    -Schedule for colonoscopy. -High-fiber diet.    -Patient was given instructions about the colonoscopy prep.    -Patient was explained about the risks and benefits of the procedure. Risks including but not limited to bleeding, infection, perforation were explained in detail. Also explained about less than 100% sensitivity with the exam and other alternatives. Anemia  Rule out occult GI blood loss. -EGD and colonoscopy    -Continue iron supplements and consider hematology evaluation      Chief Complaint: GERD, constipation, anemia    HPI: 18-year-old female with history of bipolar disorder, patent foramen ovale was seen in the ER couple of months ago because of episodes of vomiting and also noticed fresh blood. She gives history of episodes of nausea and vomiting. Has problems with acid reflux for which she tried Prilosec in the past which did not help. Complains about constipation denies any blood or mucus in the stool. Denies any abdominal pain. Denies any NSAID use. She has history of iron deficiency anemia for which she takes iron supplements.   Her hemoglobin has been stable in the past few years but patient's mother reports that she was given infusions in the past.    Chaperon: Ms. Davalos Safia OF SYSTEMS: Review of Systems   Constitutional:  Negative for activity change, appetite change, chills, diaphoresis, fatigue, fever and unexpected weight change. HENT:  Negative for ear discharge, ear pain, facial swelling, hearing loss, nosebleeds, sore throat, tinnitus and voice change. Eyes:  Negative for pain, discharge, redness, itching and visual disturbance. Respiratory:  Negative for apnea, cough, chest tightness, shortness of breath and wheezing. Cardiovascular:  Negative for chest pain and palpitations. Gastrointestinal:         As noted in HPI   Endocrine: Negative for cold intolerance, heat intolerance and polyuria. Genitourinary:  Negative for difficulty urinating, dysuria, flank pain, hematuria and urgency. Musculoskeletal:  Negative for arthralgias, back pain, gait problem, joint swelling and myalgias. Skin:  Negative for rash and wound. Neurological:  Negative for dizziness, tremors, seizures, speech difficulty, light-headedness, numbness and headaches. Hematological:  Negative for adenopathy. Does not bruise/bleed easily. Psychiatric/Behavioral:  Negative for agitation, behavioral problems and confusion. The patient is not nervous/anxious. Past Medical History:   Diagnosis Date    Anemia     Anxiety     Depression     Frequent headaches 05/20/2021      No past surgical history on file. Social History     Socioeconomic History    Marital status: Unknown     Spouse name: Not on file    Number of children: Not on file    Years of education: Not on file    Highest education level: Not on file   Occupational History    Not on file   Tobacco Use    Smoking status: Every Day     Packs/day: 1.00     Types: Cigarettes    Smokeless tobacco: Never    Tobacco comments:     Dealing with a lot of anxiety.  Refer to [facility]   Vaping Use    Vaping Use: Never used   Substance and Sexual Activity    Alcohol use: Never    Drug use: Never    Sexual activity: Never   Other Topics Concern    Not on file   Social History Narrative    Not on file     Social Determinants of Health     Financial Resource Strain: Low Risk  (5/20/2021)    Overall Financial Resource Strain (CARDIA)     Difficulty of Paying Living Expenses: Not hard at all   Food Insecurity: No Food Insecurity (5/20/2021)    Hunger Vital Sign     Worried About Running Out of Food in the Last Year: Never true     Ran Out of Food in the Last Year: Never true   Transportation Needs: No Transportation Needs (5/20/2021)    PRAPARE - Transportation     Lack of Transportation (Medical): No     Lack of Transportation (Non-Medical): No   Physical Activity: Not on file   Stress: Not on file   Social Connections: Not on file   Intimate Partner Violence: Not on file   Housing Stability: Not on file     Family History   Problem Relation Age of Onset    No Known Problems Mother     No Known Problems Father      Patient has no known allergies.   Current Outpatient Medications   Medication Sig Dispense Refill    bisacodyl (DULCOLAX) 5 mg EC tablet Take 2 tablets (10 mg total) by mouth once for 1 dose 2 tablet 0    escitalopram (Lexapro) 10 mg tablet Take 1 tablet (10 mg total) by mouth daily for 9 days 9 tablet 0    ferrous sulfate 324 (65 Fe) mg Take 1 tablet (324 mg total) by mouth 2 (two) times a day before meals 60 tablet 2    polyethylene glycol (GOLYTELY) 4000 mL solution Take 4,000 mL by mouth once for 1 dose 4000 mL 0    Vraylar 3 MG capsule Take 3 mg by mouth daily at bedtime      ferrous sulfate 325 (65 Fe) mg tablet Take 1 tablet (325 mg total) by mouth daily (Patient not taking: Reported on 12/12/2023) 30 tablet 0    hydrOXYzine pamoate (VISTARIL) 50 mg capsule       OLANZapine (ZyPREXA) 10 mg tablet Take 1 tablet (10 mg total) by mouth daily at bedtime 30 tablet 2    ondansetron (ZOFRAN-ODT) 4 mg disintegrating tablet Take 1 tablet (4 mg total) by mouth every 8 (eight) hours as needed for nausea or vomiting for up to 7 days 20 tablet 0    SODIUM FLUORIDE, DENTAL GEL, 1.1 % GEL Brush teeth using size of small pea every night - spit out toothpaste - do not rinse following brushing - use with adult supervision. 56 g 6    traZODone (DESYREL) 50 mg tablet Take 1 tablet (50 mg total) by mouth daily at bedtime 30 tablet 2     No current facility-administered medications for this visit. Blood pressure 124/62, pulse 103, height 5' 1" (1.549 m), weight 71.7 kg (158 lb), SpO2 100 %. PHYSICAL EXAM: Physical Exam  Constitutional:       Appearance: Normal appearance. She is well-developed. HENT:      Head: Normocephalic and atraumatic. Nose: Nose normal.   Eyes:      Conjunctiva/sclera: Conjunctivae normal.   Neck:      Thyroid: No thyromegaly. Vascular: No JVD. Trachea: No tracheal deviation. Cardiovascular:      Rate and Rhythm: Normal rate and regular rhythm. Heart sounds: Normal heart sounds. No murmur heard. No friction rub. No gallop. Pulmonary:      Effort: Pulmonary effort is normal. No respiratory distress. Breath sounds: Normal breath sounds. No wheezing or rales. Abdominal:      General: Bowel sounds are normal. There is no distension. Palpations: Abdomen is soft. There is no mass. Tenderness: There is no abdominal tenderness. There is no guarding. Hernia: No hernia is present. Musculoskeletal:         General: No tenderness or deformity. Cervical back: Neck supple. Right lower leg: No edema. Left lower leg: No edema. Lymphadenopathy:      Cervical: No cervical adenopathy. Skin:     General: Skin is warm and dry. Findings: No erythema or rash. Neurological:      Mental Status: She is alert and oriented to person, place, and time. Psychiatric:         Mood and Affect: Mood normal.         Behavior: Behavior normal.         Thought Content:  Thought content normal.          Lab Results   Component Value Date    WBC 11.27 (H) 10/03/2023    HGB 10.2 (L) 10/03/2023    HCT 34.4 (L) 10/03/2023    MCV 77 (L) 10/03/2023     10/03/2023     Lab Results   Component Value Date    CALCIUM 9.4 10/03/2023    K 3.7 10/03/2023    CO2 25 10/03/2023     10/03/2023    BUN 8 10/03/2023    CREATININE 0.66 10/03/2023     Lab Results   Component Value Date    ALT 9 10/03/2023    AST 11 (L) 10/03/2023    ALKPHOS 62 10/03/2023     Lab Results   Component Value Date    INR 1.03 02/03/2023    PROTIME 13.7 02/03/2023       No results found.

## 2023-12-12 NOTE — ASSESSMENT & PLAN NOTE
Secondary to slow colon transit and IBS. Doubt for any significant bowel pathology. -Advised about taking MiraLAX and stool softeners regularly. Increase oral hydration.    -Schedule for colonoscopy. -High-fiber diet.    -Patient was given instructions about the colonoscopy prep.    -Patient was explained about the risks and benefits of the procedure. Risks including but not limited to bleeding, infection, perforation were explained in detail. Also explained about less than 100% sensitivity with the exam and other alternatives.

## 2023-12-12 NOTE — ASSESSMENT & PLAN NOTE
Rule out occult GI blood loss.     -EGD and colonoscopy    -Continue iron supplements and consider hematology evaluation

## 2024-02-05 ENCOUNTER — ANESTHESIA EVENT (OUTPATIENT)
Dept: ANESTHESIOLOGY | Facility: HOSPITAL | Age: 19
End: 2024-02-05

## 2024-02-05 ENCOUNTER — ANESTHESIA (OUTPATIENT)
Dept: ANESTHESIOLOGY | Facility: HOSPITAL | Age: 19
End: 2024-02-05

## 2024-02-13 ENCOUNTER — TELEPHONE (OUTPATIENT)
Dept: PSYCHIATRY | Facility: CLINIC | Age: 19
End: 2024-02-13

## 2024-02-13 NOTE — TELEPHONE ENCOUNTER
Contacted Pt in regards to wait list status and update demographics. LVM for Pt to contact intake department.

## 2024-02-14 ENCOUNTER — TELEPHONE (OUTPATIENT)
Dept: GASTROENTEROLOGY | Facility: CLINIC | Age: 19
End: 2024-02-14

## 2024-02-18 RX ORDER — SODIUM CHLORIDE, SODIUM LACTATE, POTASSIUM CHLORIDE, CALCIUM CHLORIDE 600; 310; 30; 20 MG/100ML; MG/100ML; MG/100ML; MG/100ML
100 INJECTION, SOLUTION INTRAVENOUS CONTINUOUS
Status: CANCELLED | OUTPATIENT
Start: 2024-02-18

## 2024-02-19 ENCOUNTER — ANESTHESIA EVENT (OUTPATIENT)
Dept: GASTROENTEROLOGY | Facility: AMBULARY SURGERY CENTER | Age: 19
End: 2024-02-19

## 2024-02-19 ENCOUNTER — HOSPITAL ENCOUNTER (OUTPATIENT)
Dept: GASTROENTEROLOGY | Facility: AMBULARY SURGERY CENTER | Age: 19
Setting detail: OUTPATIENT SURGERY
Discharge: HOME | End: 2024-02-19
Attending: INTERNAL MEDICINE
Payer: COMMERCIAL

## 2024-02-19 ENCOUNTER — ANESTHESIA (OUTPATIENT)
Dept: GASTROENTEROLOGY | Facility: AMBULARY SURGERY CENTER | Age: 19
End: 2024-02-19

## 2024-02-19 VITALS
HEART RATE: 63 BPM | RESPIRATION RATE: 18 BRPM | TEMPERATURE: 96.9 F | OXYGEN SATURATION: 100 % | SYSTOLIC BLOOD PRESSURE: 95 MMHG | DIASTOLIC BLOOD PRESSURE: 58 MMHG

## 2024-02-19 DIAGNOSIS — R11.2 NAUSEA AND VOMITING, UNSPECIFIED VOMITING TYPE: ICD-10-CM

## 2024-02-19 DIAGNOSIS — K59.09 OTHER CONSTIPATION: ICD-10-CM

## 2024-02-19 DIAGNOSIS — D50.8 OTHER IRON DEFICIENCY ANEMIA: ICD-10-CM

## 2024-02-19 LAB
EXT PREGNANCY TEST URINE: NEGATIVE
EXT. CONTROL: ABNORMAL

## 2024-02-19 PROCEDURE — 81025 URINE PREGNANCY TEST: CPT | Performed by: INTERNAL MEDICINE

## 2024-02-19 PROCEDURE — 88305 TISSUE EXAM BY PATHOLOGIST: CPT | Performed by: STUDENT IN AN ORGANIZED HEALTH CARE EDUCATION/TRAINING PROGRAM

## 2024-02-19 PROCEDURE — 43239 EGD BIOPSY SINGLE/MULTIPLE: CPT | Performed by: INTERNAL MEDICINE

## 2024-02-19 PROCEDURE — 45378 DIAGNOSTIC COLONOSCOPY: CPT | Performed by: INTERNAL MEDICINE

## 2024-02-19 PROCEDURE — 88342 IMHCHEM/IMCYTCHM 1ST ANTB: CPT | Performed by: STUDENT IN AN ORGANIZED HEALTH CARE EDUCATION/TRAINING PROGRAM

## 2024-02-19 RX ORDER — SODIUM CHLORIDE, SODIUM LACTATE, POTASSIUM CHLORIDE, CALCIUM CHLORIDE 600; 310; 30; 20 MG/100ML; MG/100ML; MG/100ML; MG/100ML
INJECTION, SOLUTION INTRAVENOUS CONTINUOUS PRN
Status: DISCONTINUED | OUTPATIENT
Start: 2024-02-19 | End: 2024-02-19

## 2024-02-19 RX ORDER — PANTOPRAZOLE SODIUM 40 MG/1
40 TABLET, DELAYED RELEASE ORAL DAILY
Qty: 30 TABLET | Refills: 11 | Status: SHIPPED | OUTPATIENT
Start: 2024-02-19

## 2024-02-19 RX ORDER — LIDOCAINE HYDROCHLORIDE 10 MG/ML
INJECTION, SOLUTION EPIDURAL; INFILTRATION; INTRACAUDAL; PERINEURAL AS NEEDED
Status: DISCONTINUED | OUTPATIENT
Start: 2024-02-19 | End: 2024-02-19

## 2024-02-19 RX ORDER — PROPOFOL 10 MG/ML
INJECTION, EMULSION INTRAVENOUS AS NEEDED
Status: DISCONTINUED | OUTPATIENT
Start: 2024-02-19 | End: 2024-02-19

## 2024-02-19 RX ORDER — SODIUM CHLORIDE, SODIUM LACTATE, POTASSIUM CHLORIDE, CALCIUM CHLORIDE 600; 310; 30; 20 MG/100ML; MG/100ML; MG/100ML; MG/100ML
100 INJECTION, SOLUTION INTRAVENOUS CONTINUOUS
Status: DISCONTINUED | OUTPATIENT
Start: 2024-02-19 | End: 2024-02-23 | Stop reason: HOSPADM

## 2024-02-19 RX ADMIN — PROPOFOL 80 MG: 10 INJECTION, EMULSION INTRAVENOUS at 10:33

## 2024-02-19 RX ADMIN — PROPOFOL 20 MG: 10 INJECTION, EMULSION INTRAVENOUS at 10:36

## 2024-02-19 RX ADMIN — SODIUM CHLORIDE, SODIUM LACTATE, POTASSIUM CHLORIDE, AND CALCIUM CHLORIDE: .6; .31; .03; .02 INJECTION, SOLUTION INTRAVENOUS at 10:24

## 2024-02-19 RX ADMIN — PROPOFOL 30 MG: 10 INJECTION, EMULSION INTRAVENOUS at 10:42

## 2024-02-19 RX ADMIN — PROPOFOL 40 MG: 10 INJECTION, EMULSION INTRAVENOUS at 10:44

## 2024-02-19 RX ADMIN — PROPOFOL 50 MG: 10 INJECTION, EMULSION INTRAVENOUS at 10:34

## 2024-02-19 RX ADMIN — LIDOCAINE HYDROCHLORIDE 80 MG: 10 INJECTION, SOLUTION EPIDURAL; INFILTRATION; INTRACAUDAL at 10:31

## 2024-02-19 RX ADMIN — PROPOFOL 150 MG: 10 INJECTION, EMULSION INTRAVENOUS at 10:31

## 2024-02-19 NOTE — DISCHARGE INSTRUCTIONS
Upper Endoscopy and Colonoscopy   WHAT YOU NEED TO KNOW:   An upper endoscopy is also called an upper gastrointestinal (GI) endoscopy, or an esophagogastroduodenoscopy (EGD). It is a procedure to examine the inside of your esophagus, stomach, and duodenum (first part of the small intestine) with a scope. You may feel bloated, gassy, or have some abdominal discomfort after your procedure. Your throat may be sore for 24 to 36 hours. You may burp or pass gas from air that is still inside your body.                A colonoscopy is a procedure to examine the inside of your colon (intestine) with a scope. Polyps or tissue growths may have been removed during your colonoscopy. It is normal to feel bloated and to have some abdominal discomfort. You should be passing gas. If you have hemorrhoids or you had polyps removed, you may have a small amount of bleeding.          DISCHARGE INSTRUCTIONS:   Seek care immediately if:   You have sudden, severe abdominal pain.     You have problems swallowing.     You have a large amount of black, sticky bowel movements or blood in your bowel movements.     You have sudden trouble breathing.     You feel weak, lightheaded, or faint or your heart beats faster than normal for you.     Contact your healthcare provider if:   You have a fever and chills.      You have nausea or are vomiting.      Your abdomen is bloated or feels full and hard.     You have abdominal pain.    You have a large amount of black, sticky bowel movements or blood in your bowel movements.    You have not had a bowel movement for 3 days after your procedure.    You have rash or hives.    You have questions or concerns about your procedure.     Activity:   ·       Do not lift, strain, or run for 24 hours after your procedure.     ·       Rest after your procedure. You have been given medicine to relax you. Do not drive or make important decisions until the day after your procedure. Return to your normal activity as  directed.     ·       Relieve gas and discomfort from bloating by lying on your right side with a heating pad on your abdomen. You may need to take short walks to help the gas move out. Eat small meals until bloating is relieved.  Follow up with your healthcare provider as directed: Write down your questions so you remember to ask them during your visits.      If you take a “blood thinner”, please review the specific instructions from your endoscopist about when you should resume it. These can be found in the “Recommendation” and “Your Medication list” sections of this After Visit Summary. ;e

## 2024-02-19 NOTE — ANESTHESIA PREPROCEDURE EVALUATION
Procedure:  COLONOSCOPY  EGD    Difficulty tolerating prep; results still brown    Relevant Problems   ANESTHESIA (within normal limits)      CARDIO (within normal limits)      HEMATOLOGY   (+) Anemia      NEURO/PSYCH   (+) Anxiety   (+) Panic disorder   (+) Social anxiety disorder      PULMONARY   (+) Asthma (Resolved) (childhood)      Other   (+) Bipolar II disorder (HCC)   (+) Tobacco use disorder      Physical Exam    Airway    Mallampati score: II  TM Distance: >3 FB  Neck ROM: full     Dental   Comment: Poor dentition; denies loose     Cardiovascular      Pulmonary      Other Findings  anxiouspost-pubertal.    Lab Results   Component Value Date    WBC 11.27 (H) 10/03/2023    HGB 10.2 (L) 10/03/2023     10/03/2023     Lab Results   Component Value Date    SODIUM 137 10/03/2023    K 3.7 10/03/2023    BUN 8 10/03/2023    CREATININE 0.66 10/03/2023    EGFR 128 10/03/2023     Anesthesia Plan  ASA Score- 2     Anesthesia Type- IV sedation with anesthesia with ASA Monitors.         Additional Monitors:     Airway Plan:            Plan Factors-Exercise tolerance (METS): >4 METS.    Chart reviewed.   Existing labs reviewed. Patient summary reviewed.    Patient is a current smoker.              Induction- intravenous.    Postoperative Plan-     Informed Consent- Anesthetic plan and risks discussed with patient and mother.  I personally reviewed this patient with the CRNA. Discussed and agreed on the Anesthesia Plan with the CRNA..

## 2024-02-19 NOTE — H&P
History and Physical -  [facility]  Bethanie Cronin 18 y.o. female MRN: [id number]        HPI: 18-year-old female history of anemia was seen in the office because of nausea vomiting and constipation.    Historical Information   Past Medical History:   Diagnosis Date    Anemia     Anxiety     Depression     Frequent headaches 05/20/2021     History reviewed. No pertinent surgical history.  Social History   Social History     Substance and Sexual Activity   Alcohol Use Never     Social History     Substance and Sexual Activity   Drug Use Never     Social History     Tobacco Use   Smoking Status Every Day    Current packs/day: 1.00    Types: Cigarettes   Smokeless Tobacco Never   Tobacco Comments    Dealing with a lot of anxiety. Refer to Behavioral Health     Family History   Problem Relation Age of Onset    No Known Problems Father     Cancer Maternal Grandmother     Cancer Maternal Grandfather        Meds/Allergies     (Not in a hospital admission)      No Known Allergies    Objective     Blood pressure 116/72, pulse 103, temperature 97.9 °F (36.6 °C), temperature source Tympanic, resp. rate 18, SpO2 97%.    Physical Exam:    Chest- CTA  Heart- RRR  Abdomen- NT/ND  Extremities- No edema    ASSESSMENT:     Anemia, nausea and vomiting    PLAN:    EGD and colonoscopy

## 2024-02-22 PROCEDURE — 88305 TISSUE EXAM BY PATHOLOGIST: CPT | Performed by: STUDENT IN AN ORGANIZED HEALTH CARE EDUCATION/TRAINING PROGRAM

## 2024-02-22 PROCEDURE — 88342 IMHCHEM/IMCYTCHM 1ST ANTB: CPT | Performed by: STUDENT IN AN ORGANIZED HEALTH CARE EDUCATION/TRAINING PROGRAM

## 2024-03-22 ENCOUNTER — OFFICE VISIT (OUTPATIENT)
Dept: FAMILY MEDICINE CLINIC | Facility: CLINIC | Age: 19
End: 2024-03-22
Payer: COMMERCIAL

## 2024-03-22 VITALS
HEART RATE: 90 BPM | TEMPERATURE: 99.8 F | BODY MASS INDEX: 28.25 KG/M2 | WEIGHT: 149.6 LBS | HEIGHT: 61 IN | OXYGEN SATURATION: 97 % | SYSTOLIC BLOOD PRESSURE: 102 MMHG | DIASTOLIC BLOOD PRESSURE: 68 MMHG

## 2024-03-22 DIAGNOSIS — K08.9 POOR DENTITION: ICD-10-CM

## 2024-03-22 DIAGNOSIS — D64.9 ANEMIA, UNSPECIFIED TYPE: ICD-10-CM

## 2024-03-22 DIAGNOSIS — E63.9 POOR DIET: ICD-10-CM

## 2024-03-22 DIAGNOSIS — Z13.89 SCREENING FOR BLOOD OR PROTEIN IN URINE: ICD-10-CM

## 2024-03-22 DIAGNOSIS — F17.200 TOBACCO USE DISORDER: ICD-10-CM

## 2024-03-22 DIAGNOSIS — F33.3 SEVERE EPISODE OF RECURRENT MAJOR DEPRESSIVE DISORDER, WITH PSYCHOTIC FEATURES (HCC): ICD-10-CM

## 2024-03-22 DIAGNOSIS — Z13.29 SCREENING FOR THYROID DISORDER: ICD-10-CM

## 2024-03-22 DIAGNOSIS — F31.81 BIPOLAR II DISORDER (HCC): ICD-10-CM

## 2024-03-22 DIAGNOSIS — R53.82 CHRONIC FATIGUE, UNSPECIFIED: Primary | ICD-10-CM

## 2024-03-22 DIAGNOSIS — R11.2 NAUSEA AND VOMITING, UNSPECIFIED VOMITING TYPE: ICD-10-CM

## 2024-03-22 PROCEDURE — 99214 OFFICE O/P EST MOD 30 MIN: CPT | Performed by: NURSE PRACTITIONER

## 2024-03-24 NOTE — PROGRESS NOTES
Assessment/Plan:    1. Chronic fatigue, unspecified  -     Comprehensive metabolic panel  -     CBC and differential; Future  -     TSH, 3rd generation with Free T4 reflex  -     Vitamin B12; Future  -     Vitamin D 25 hydroxy; Future  -     Iron Panel (Includes Ferritin, Iron Sat%, Iron, and TIBC); Future  -     UA w Reflex to Microscopic w Reflex to Culture; Future    2. Anemia, unspecified type  -     CBC and differential; Future  -     Vitamin B12; Future  -     Iron Panel (Includes Ferritin, Iron Sat%, Iron, and TIBC); Future    3. Screening for thyroid disorder  -     TSH, 3rd generation with Free T4 reflex    4. Poor diet  -     Comprehensive metabolic panel  -     CBC and differential; Future  -     TSH, 3rd generation with Free T4 reflex  -     Vitamin B12; Future  -     Vitamin D 25 hydroxy; Future  -     Iron Panel (Includes Ferritin, Iron Sat%, Iron, and TIBC); Future  -     UA w Reflex to Microscopic w Reflex to Culture; Future    5. Tobacco use disorder    6. Bipolar II disorder (HCC)    7. Severe episode of recurrent major depressive disorder, with psychotic features (HCC)  -     Comprehensive metabolic panel  -     CBC and differential; Future    8. Screening for blood or protein in urine  -     UA w Reflex to Microscopic w Reflex to Culture; Future    9. Nausea and vomiting, unspecified vomiting type  -     Comprehensive metabolic panel  -     CBC and differential; Future        The case discussed with patient and her mother using patient centered shared decision making.The patient was counseled regarding instructions for management,-- risk factor reductions,-- prognosis,-- impressions,-- risks and benefits of treatment options,-- importance of compliance with treatment. I have reviewed the instructions with the patient, answering all questions to her satisfaction.        Had a lengthy discussion with karen and her mother. She has several constitutional c/o which are likely multi factor->>>  "lifestyle related, suboptimal management of bipolar/depression, suboptimal treatment of VJ, unaddressed dental decay/caries, chronic tobacco dependence.     First we need to ascertain data->lab values and correct anemia, nutritional deficits    Stressed importance of healthy diet, supplementation, exercise, stress management    Encouraged to schedule dentist appointment    Strongly advised smoking cessation    Strongly advised to re-establish with psychiatrist (and keep appointments to prevent dismissal from care)    RTO after getting labs-will review and address  Gave recommendations on dietary improvements to consider      No follow-ups on file.    I have spent a total time of 35 minutes on 03/24/24 in caring for this patient including Diagnostic results, Prognosis, Risks and benefits of tx options, Instructions for management, Patient and family education, Importance of tx compliance, Risk factor reductions, Impressions, Counseling / Coordination of care, Documenting in the medical record, Reviewing / ordering tests, medicine, procedures  , and Obtaining or reviewing history  .    Subjective:      Patient ID: Bethanie Cronin is a 18 y.o. female.    Chief Complaint   Patient presents with    Establish Care     Previous PCP was [facility] in [place] and has went to  since for acute visits        19 yo female with h/o bipolar, depression, anemia, poor dentition,PFO (by report-echo evidence not available), VJ, medical noncompliance presents to establish care  She is accompanied by her mother    C/c \"I don't feel good\"  Mom endorses that she would like to look for cause for her unwell feeling and would like to assist Bethanie in feeling better    Chart review reveals ED visit 10/2023 for reported bloody emesis. She was seen by GI thereafter. She had essentially normal EGD, colonoscopy except for small hiatal hernia    She has chronically low iron. Last lab draw 10/2023. She was on FE in past. She also received " iron infusions.   She takes nothing, including MVI at present.     She has several dental caries. Needs intense restorative care. Mom endorses that she is looking for provider who covers dental implants for her dental care. Nothing is being addressed at present    She smokes 1 ppd, also vapes. Denies etoh, MJ, other recreationals  Unemployed  No formal exercise  Untreated psychiatric disorder  She was seeing psychiatrist however due to high no show rate she was discharged from practice. Mother trying to find another psychiatrist    Diet is poor. Diet recall from past 2 days-> yesterday ate chinese take out fried rice and lo mein. Today only meal was pizza rolls          Reviewed medical history in detail. Changes to medical record changed where appropriate. Reviewed medications. Patient taking as prescribed. Tolerating well. Denies Side effects. Reviewed recent visits with specialists co-managing chronic conditions.     The following portions of the patient's history were reviewed and updated as appropriate: allergies, current medications, past family history, past medical history, past social history, past surgical history and problem list.    Review of Systems   Constitutional:  Positive for fatigue. Negative for fever and unexpected weight change.   HENT:  Positive for dental problem. Negative for trouble swallowing.    Eyes:  Negative for visual disturbance.   Respiratory:  Negative for cough and shortness of breath.    Gastrointestinal:  Positive for abdominal pain, constipation and nausea. Negative for blood in stool.   Endocrine: Negative.    Genitourinary: Negative.    Musculoskeletal:  Positive for arthralgias and myalgias.   Skin:  Positive for pallor.   Neurological:  Positive for dizziness, weakness and headaches. Negative for tremors, seizures, syncope and speech difficulty.   Hematological:  Does not bruise/bleed easily.   Psychiatric/Behavioral:  Positive for decreased concentration, dysphoric mood  "and sleep disturbance. Negative for self-injury and suicidal ideas. The patient is nervous/anxious.          Current Outpatient Medications   Medication Sig Dispense Refill    pantoprazole (PROTONIX) 40 mg tablet Take 1 tablet (40 mg total) by mouth daily 30 tablet 11    bisacodyl (DULCOLAX) 5 mg EC tablet Take 2 tablets (10 mg total) by mouth once for 1 dose (Patient not taking: Reported on 3/22/2024) 2 tablet 0    escitalopram (Lexapro) 10 mg tablet Take 1 tablet (10 mg total) by mouth daily for 9 days (Patient not taking: Reported on 3/22/2024) 9 tablet 0    OLANZapine (ZyPREXA) 10 mg tablet Take 1 tablet (10 mg total) by mouth daily at bedtime (Patient not taking: Reported on 3/22/2024) 30 tablet 2    polyethylene glycol (GOLYTELY) 4000 mL solution Take 4,000 mL by mouth once for 1 dose (Patient not taking: Reported on 3/22/2024) 4000 mL 0     No current facility-administered medications for this visit.       Patient Active Problem List   Diagnosis    Abnormal result on screening urine test    Anxiety    Depressed affect    Insomnia    Anemia    Patent foramen ovale    Weight loss, abnormal    Dental decay    Glossitis    History of self mutilation    Severe episode of recurrent major depressive disorder, with psychotic features (HCC)    Social anxiety disorder    Panic disorder    Bipolar II disorder (HCC)    Tobacco use disorder    Nausea and vomiting    Other constipation       Objective:    /68 (BP Location: Left arm, Patient Position: Sitting, Cuff Size: Standard) Comment: Pt does report dizziness when standing up to quick  Pulse 90   Temp 99.8 °F (37.7 °C) (Temporal) Comment: had hat/hoffman on  Ht 5' 1\" (1.549 m)   Wt 67.9 kg (149 lb 9.6 oz)   SpO2 97%   BMI 28.27 kg/m²        Physical Exam  Vitals and nursing note reviewed.   Constitutional:       General: She is not in acute distress.     Appearance: Normal appearance.      Comments: Disheveled 19 yo female   HENT:      Head: Normocephalic and " atraumatic. No raccoon eyes.      Mouth/Throat:      Mouth: No oral lesions.      Dentition: Abnormal dentition. Dental caries present.   Eyes:      Pupils: Pupils are equal, round, and reactive to light.   Neck:      Thyroid: No thyromegaly.   Cardiovascular:      Rate and Rhythm: Normal rate and regular rhythm.      Pulses: Normal pulses.      Heart sounds: Normal heart sounds. No murmur heard.  Pulmonary:      Effort: Pulmonary effort is normal.      Breath sounds: Normal breath sounds.   Abdominal:      General: Bowel sounds are normal.      Palpations: Abdomen is soft.      Tenderness: There is no abdominal tenderness. There is no guarding.   Musculoskeletal:         General: No deformity.      Right lower leg: No edema.      Left lower leg: No edema.   Lymphadenopathy:      Cervical: No cervical adenopathy.   Skin:     General: Skin is warm and dry.      Capillary Refill: Capillary refill takes less than 2 seconds.      Coloration: Skin is not pale.   Neurological:      General: No focal deficit present.      Mental Status: She is alert.      Motor: No weakness.      Gait: Gait normal.   Psychiatric:         Mood and Affect: Affect is flat.         Speech: Speech normal.         Behavior: Behavior is withdrawn. Behavior is cooperative.                MANNY Johnson

## 2024-07-05 ENCOUNTER — TELEPHONE (OUTPATIENT)
Age: 19
End: 2024-07-05

## 2024-07-05 DIAGNOSIS — F31.81 BIPOLAR II DISORDER (HCC): Primary | ICD-10-CM

## 2024-07-05 DIAGNOSIS — F33.3 SEVERE EPISODE OF RECURRENT MAJOR DEPRESSIVE DISORDER, WITH PSYCHOTIC FEATURES (HCC): ICD-10-CM

## 2024-07-05 NOTE — TELEPHONE ENCOUNTER
I advised at 3/22/24 office visit that she will need to Re-establish with psych for bipolar management. She was seeing  psych but was discharged 11/2022 for too many no shows. I placed new referral. She can call her insurance for a list pf participating psychiatrists as well

## 2024-07-05 NOTE — TELEPHONE ENCOUNTER
Patients mom called in asking for a mental health services referral. Asked if patient should be seen by Kelly or if a referral can just be sent. Patients mom asked to Please advise patient or Mom once referral has been sent. Patients mom is on medical communication consent form.  Thank you

## 2024-07-21 ENCOUNTER — SOCIAL WORK (OUTPATIENT)
Dept: BEHAVIORAL/MENTAL HEALTH CLINIC | Facility: CLINIC | Age: 19
End: 2024-07-21
Payer: COMMERCIAL

## 2024-07-21 DIAGNOSIS — F41.0 GENERALIZED ANXIETY DISORDER WITH PANIC ATTACKS: Primary | ICD-10-CM

## 2024-07-21 DIAGNOSIS — F41.1 GENERALIZED ANXIETY DISORDER WITH PANIC ATTACKS: Primary | ICD-10-CM

## 2024-07-21 PROCEDURE — H2021 COM WRAP-AROUND SV, 15 MIN: HCPCS

## 2024-07-21 NOTE — PROGRESS NOTES
"Assessment      Crisis Intake  Patient Intake  Special Needs: none  Living Arrangement: House  Can patient return home?: Yes  Address to be Discharge to:: see facesheet  Patient's Telephone Number: see facesheet  Access to Firearms: No  Type of Work:  (Patient is unemployed)  School Grade/Year: 5th  Admission Status  Status of Admission:  (Outpatient treatment)  County of Residence: [place]  Patient History  Presenting Problems: Patient is a 18 years old single female presenting to the [facility] accompanied by her mother, Isa ARANDA. Patient reported that he has been experiencing mood swings, anxiety and panic attacks for the past couple of weeks. Patient denied any SI/HI/AH/VH, paranoia, delusions, and prior inpatient treatment. Patient informed that she received psychiatric treatment, medication management and therapy from early 2020 to late 2023 at [facility]. Patient reported that she was seen by NARCISO Qiu who was prescribing her Lexapro and zyprexa which she stopped taking during the early 2024. Patient reported that she was also seen a therapist up until late 2023 and discontinued mental health treatment altogether because she felt it was not helping and her insurance lapsed. Patient reported having experienced emotional trauma mostly related to family comflicts. Patient informed that she has been rceivign treatment for GERD at [facility]. Patient denied having any legal problems, access to weapons, does not use alcohol or drugs but vapes on a daily basis. Patient reported living with her mother and sister and they have good relationship. Patient stated that during the past week her eating and sleeping patterns have decreased as a result of her \"mental health issues\" [facility] assisted Patient exploring mental  health treatment alternatives and she expressed interest in participating in outpatient treatment. A referral to [facility] Case Management has been placed for psychiatry, " medication management and talk therapy placement.  Treatment History:  (Psychiatry, meds management and therapy from 2020 to 2023)  Currently in Treatment: No  Medical Problems:  (GERD)  Legal Issues:  (none reported)  Substance Abuse: No (Patient reported vaping daily)  Mental Status Exam  Orientation Level: Oriented X4, Appropriate for age  Affect: Appropriate  Speech: Soft, Logical/coherent  Mood: Anxious, Fearful  Thought Content: Appropriate  Hallucination Type: No problems reported or observed.  Judgement: Good  Impulse Control: Good  Attention Span: Appropriate for age  Memory: Intact  Appetite: Fair  Weight Changes:  (low apettite)  Sleep: Fair  Total Hours of Sleep:  (6-7 hrs/night)  Appear/Hygiene: Neatly Groomed  ADL Comments:  (manages her ADL's)  Strengths and Limitations  Patient Strengths:  (Patient recognizes her mental health deficits.)  Patient Limitations: Unresourceful, Non-compliance  Ideations  Current Self Harm/Suicidal Ideation: No  Previous Self Harm/Suicidal Ideation: No  Violence Risk to Self: Denies ideation within past 6 months  Current homicidal or violent thoughts toward another: Denies ideations within past 6 months  Previous Plans to Harm Another Person: No  Violence Risk to Others: Denies within past 6 months  Previous History of Violence to Others: No  Provisional Diagnosis  Axis I: anxiety  Axis II: deferred  Axis III: deferred    C-SSRS  [place] Suicide Severity Rating Scale  C-SSRS Q1. Wish to be Dead: No - In the Past Month  *Risk Level*: Low Risk      Patient was referred by: Self or Family    Visit start and stop times:    07/21/24  Start Time: (P) 1300  Stop Time: (P) 1345  Total Visit Time: (P) 45 minutes        Discharge and Safety    This writer discussed the patients current presentation and recommended discharge plan with outpatient treatment referral.  They agree with the patient being discharged at this time with referrals and/or information about [facility] Case Management  for psychiatry, medication management and talk therapy.     The patient was Instructed to follow up with their PCP      This writer and the patient completed a safety plan.  The patient was provided with a copy of their safety plan with encouragement to utilize the plan following discharge.   The patient declined to complete a safety plan however a blank plan was provided for future use.  (if applicable, if not, delete)  In addition, the patient was instructed to call local [facility] crisis, other crisis services, 911 or to go to the nearest ER immediately if their situation changes at any time.     SAFETY PLAN  Warning Signs (thoughts, images, mood, behavior, situations) of a potential crisis: feeling irritable, fear of abandonment, hyper-focused.      Coping Skills (what can I do to take my mind off the problem, or to keep myself safe): watch movies, talk to mother and friends.      Outside Support (who can I reach out to for support and help): mother and friends.        National Suicide Prevention Hotline:  1-787.726.4890    [facility] [phone] - Crisis  [facility] [phone] - LVF Crisis/Mobile Crisis   [phone] - [facility] Crisis   [facility]: [phone]  [facility]: [phone]  [facility] [phone] - Crisis  [facility] [phone] - Crisis    [facility] [phone] - Crisis  [facility] [phone] - Crisis   [phone] - Peer Support Talk Line (1-9pm daily)  [phone] - Teen Support Talk Line (1-9pm daily)  [phone] - [facility] [phone]- Crisis   [facility] [phone] - Crisis  [facility] [phone] - Crisis   [facility]) [phone] - [facility] Crisis

## 2024-07-22 ENCOUNTER — TELEPHONE (OUTPATIENT)
Dept: BEHAVIORAL/MENTAL HEALTH CLINIC | Facility: CLINIC | Age: 19
End: 2024-07-22

## 2024-07-22 ENCOUNTER — DOCUMENTATION (OUTPATIENT)
Dept: BEHAVIORAL/MENTAL HEALTH CLINIC | Facility: CLINIC | Age: 19
End: 2024-07-22

## 2024-07-22 ENCOUNTER — TELEPHONE (OUTPATIENT)
Age: 19
End: 2024-07-22

## 2024-07-22 NOTE — TELEPHONE ENCOUNTER
Telephone call to PT as follow up from [facility] visit. Spoke with PT who states she is doing okay.     Case management received referral to assist with linkage to medication management and talk therapy. PT is scheduled for medication management with Dr. Pérez on 9/17/24 at 10am with a follow up appointment scheduled for 10/15/24 at 1pm.     PT is scheduled for talk therapy through [facility] with Isaias Tolliver on 9/25/24 at 12pm.     Discussion held with PT to call county crisis, return to walk-in center, call 911 or go to the nearest emergency room immediately if their situation changes/worsens.

## 2024-07-22 NOTE — TELEPHONE ENCOUNTER
Patient is [facility] d/c has been scheduled for med mgmt and talk therapy       Dr. Pérez 9/17 @ 10a   Isaias Tolliver 9/25 @ 12p

## 2024-07-29 ENCOUNTER — TELEPHONE (OUTPATIENT)
Dept: PSYCHIATRY | Facility: CLINIC | Age: 19
End: 2024-07-29

## 2024-07-30 ENCOUNTER — OFFICE VISIT (OUTPATIENT)
Dept: NEUROLOGY | Facility: CLINIC | Age: 19
End: 2024-07-30
Payer: COMMERCIAL

## 2024-07-30 VITALS — HEART RATE: 82 BPM | DIASTOLIC BLOOD PRESSURE: 76 MMHG | SYSTOLIC BLOOD PRESSURE: 120 MMHG

## 2024-07-30 DIAGNOSIS — G43.009 MIGRAINE WITHOUT AURA AND WITHOUT STATUS MIGRAINOSUS, NOT INTRACTABLE: Primary | ICD-10-CM

## 2024-07-30 DIAGNOSIS — F40.240 CLAUSTROPHOBIA: ICD-10-CM

## 2024-07-30 PROCEDURE — 99204 OFFICE O/P NEW MOD 45 MIN: CPT

## 2024-07-30 RX ORDER — AMITRIPTYLINE HYDROCHLORIDE 10 MG/1
TABLET, FILM COATED ORAL
Qty: 150 TABLET | Refills: 1 | Status: SHIPPED | OUTPATIENT
Start: 2024-07-30

## 2024-07-30 RX ORDER — SUMATRIPTAN 100 MG/1
100 TABLET, FILM COATED ORAL AS NEEDED
Qty: 9 TABLET | Refills: 0 | Status: SHIPPED | OUTPATIENT
Start: 2024-07-30

## 2024-07-30 RX ORDER — LORAZEPAM 1 MG/1
1 TABLET ORAL
Qty: 2 TABLET | Refills: 0 | Status: SHIPPED | OUTPATIENT
Start: 2024-07-30

## 2024-07-30 NOTE — PROGRESS NOTES
Ambulatory Visit  Name: Bethanie Cronin      : 2005      MRN: [id number]  Encounter Provider: MANNY Guadarrama  Encounter Date: 2024   Encounter department: NEUROLOGY [facility]    Assessment & Plan   1. Migraine without aura and without status migrainosus, not intractable  Assessment & Plan:  She has been experiencing approximately 3-4 migraine headaches per week which started this past year.  She was not a headache person previously and she denies any trauma, concussions, or any changes to medications.  She has tried over the counter medications including ibuprofen, advil, and aspirin but these have not been effective. Headaches do not seem to be positional in nature. She does experience ongoing challenges with mood, particularly rapid changes to her mood along with anxiety and depression.  We discussed topamax, amitriptyline, and propranolol in office today.  Patient already experiences low blood pressures and admittedly has a hard time staying hydrated, so we decided not to start with propranolol today.  Mom and patient both interested in topamax or amitriptyline but ultimately decided on amitriptyline in hopes that it would also help with her mood.   Additional Workup:  Due to increased frequency and severity of headaches and migraines I recommend further evaluation with MRI brain without contrast to rule out structural or treatable causes of symptoms   Establish care with new psychiatrist and therapist that are in network (insurance changed)  Preventative:  We discussed headache hygiene and lifestyle factors that may improve headaches  Start amitriptyline 20mg daily for 1 week. May increase to 20mg nightly thereafter. Advised her to let me know immediately if she experiences any side effects or has any worsening anxiety/depression and to present to the nearest ER with thoughts of hurting herself or someone else  Past/ failed/contraindicated: none  Future options:  "Propranolol,Topamax, CGRP med, botox  Acute:  Discussed not taking over-the-counter or prescription pain medications more than 3 days per week to prevent medication overuse/rebound headache  Start Imitrex 100mg at the earliest onset of a migraine.  May repeat again in 2 hours if not completely headache free. No more than 2 tabs in 24 hours  Past/ failed/contraindicated: advil, ibuprofen, aspirin (ineffective)  Future options:  Other triptan (Maxalt), ubrelvy, reyvow, nurtec    Orders:  -     MRI brain without contrast; Future; Expected date: 07/30/2024  -     SUMAtriptan (IMITREX) 100 mg tablet; Take 1 tablet (100 mg total) by mouth as needed for migraine May repeat in 2 hours. Limit of 3/week or 9/month  -     amitriptyline (ELAVIL) 10 mg tablet; start 10mg at bedtime. Increase by 10mg each week until good effect on headaches/pain or reach 50mg daily  2. Claustrophobia  -     LORazepam (ATIVAN) 1 mg tablet; Take 1 tablet (1 mg total) by mouth 60 minutes pre-procedure May repeat x1 30 mins prior if needed      Patient should follow up in 2 months, or I would be happy to see her sooner if needed.  Patient should call the office or send a [employer] message with any questions or concerns.  Patient should present to the nearest emergency department with any concerning symptoms.     History of Present Illness     We had the pleasure of evaluating Bethanie in neurological consultation today. she is a 18 y.o. year-old female who presents today for evaluation of headaches.     She has been experiencing approximately 3-4 migraine headaches per week which started this past year.  She was not a headache person previously and she denies any trauma, concussions, or any changes to medications.  She has tried over the counter medications including ibuprofen, advil, and aspirin but these have not been effective. Headaches do not seem to be positional in nature.      Of note, Mom reports problems with energy, appetite, feeling \"low\" as " far as her mood. She was seen in the past for mood but mom's insurance changed and she was forced to find new providers in network.  Went to mental health clinic in [place] to try to expedite appointment with psychiatry and therapy.  Patient reports feeling stable at the moment but that her moods can fluctuate pretty rapidly.     Headaches started at what age? 18 years old  How often do the headaches occur? 3-4x per week  - as of 7/30/2024:   What time of the day do the headaches start?  No particular time of day   How long do the headaches last? Hours   Are you ever headache free? Yes    Aura? without aura     Last eye exam: scheduled on the 8/10/24 for eye appointment.     Where is your headache located and pain quality? Pressure pain at the center of her head at vertex.   What is the intensity of pain? Average: 6/10, worst 8-9/10    Associated symptoms:   [x] Nausea  (may be related)   [] Vomiting        [] Diarrhea  [] Insomnia    [x] Stiff or sore neck   [x] Problems with concentration  [x] Photophobia     [x]Phonophobia      [] Osmophobia  [] Blurred vision   [x] Prefer quiet, dark room  [x] Light-headed or dizzy     [x] Tinnitus (at random)  [] Hands or feet tingle or feel numb/paresthesias    [] Ptosis      [] Facial droop  [] Lacrimation  [] Nasal congestion/rhinorrhea   [] Flushing of face      Things that make the headache worse? Moving her head around quickly     Headache triggers:  none that she is aware of.     Have you seen someone else for headaches or pain? No  Have you had trigger point injection performed and how often? No  Have you had Botox injection performed and how often? No   Have you had epidural injections or transforaminal injections performed? No  Are you current pregnant or planning on getting pregnant? No. Not currently on any prevention but not sexually active.   Have you ever had any Brain imaging? no    LIFESTYLE  Sleep   Averages: 8-10 hours per night.   Problems falling asleep?:  "  No  Problems staying asleep?:  No  Do you snore while asleep?No  Do you wake up with headaches? No  Physical activity: none at this time.  Water: \"not enough\" per patient.   Caffeine: 18oz soda per day  Mood: increased depression and anxiety recently.     Pertinent family history:  Family history of headaches:  no known family members with significant headaches  Any family history of aneurysms - No    Pertinent social history:  Work: none currently  Lives with lives with their family  Illicit Drugs: denies  Alcohol/tobacco: No alcohol. But 3 cigarettes per day and also vapes.     What medications do you take or have you taken for your headaches?:    ABORTIVE:    OTC medications: ibuprofen, advil, aspirin (not very effective)  Prescription: none  Past/failed/contraindicated: none    PREVENTIVE:   OTC medications: none  Prescription: none  Medications from other providers: none  Past/failed/contraindicated:none    Review of Systems:  Constitutional:  Positive for appetite change and fatigue (Very Tired all the time). Negative for fever.   HENT: Negative.  Negative for hearing loss, tinnitus, trouble swallowing and voice change.    Eyes:  Positive for visual disturbance (States that it like staticy and states has visual snow). Negative for photophobia and pain.   Respiratory: Negative.  Negative for shortness of breath.    Cardiovascular: Negative.  Negative for palpitations.   Gastrointestinal:  Positive for nausea. Negative for vomiting.   Endocrine: Negative.  Negative for cold intolerance.   Genitourinary: Negative.  Negative for dysuria, frequency and urgency.   Musculoskeletal:  Positive for gait problem (Stumbles and Freezing) and neck stiffness (At times). Negative for back pain, myalgias and neck pain.        Balance Issues   Skin: Negative.  Negative for rash.   Allergic/Immunologic: Negative.    Neurological:  Positive for dizziness and headaches (about 4 days a week, More so in the front of the head). " Negative for tremors, seizures, syncope, facial asymmetry, speech difficulty, weakness, light-headedness and numbness.   Hematological: Negative.  Does not bruise/bleed easily.   Psychiatric/Behavioral:  Positive for sleep disturbance (Trouble going to sleep). Negative for confusion and hallucinations.         Memory Issues   All other systems reviewed and are negative.  Reviewed ROS as entered by medical assistant.     Past Medical History   Past Medical History:   Diagnosis Date    Anemia     Anxiety     Depression     Frequent headaches 05/20/2021     History reviewed. No pertinent surgical history.  Family History   Problem Relation Age of Onset    No Known Problems Mother     No Known Problems Father     Cancer Maternal Grandmother         ovarian cancer    Cancer Maternal Grandfather         prostate cancer     Current Outpatient Medications on File Prior to Visit   Medication Sig Dispense Refill    pantoprazole (PROTONIX) 40 mg tablet Take 1 tablet (40 mg total) by mouth daily 30 tablet 11    [DISCONTINUED] bisacodyl (DULCOLAX) 5 mg EC tablet Take 2 tablets (10 mg total) by mouth once for 1 dose (Patient not taking: Reported on 3/22/2024) 2 tablet 0    [DISCONTINUED] escitalopram (Lexapro) 10 mg tablet Take 1 tablet (10 mg total) by mouth daily for 9 days (Patient not taking: Reported on 3/22/2024) 9 tablet 0    [DISCONTINUED] OLANZapine (ZyPREXA) 10 mg tablet Take 1 tablet (10 mg total) by mouth daily at bedtime (Patient not taking: Reported on 3/22/2024) 30 tablet 2    [DISCONTINUED] polyethylene glycol (GOLYTELY) 4000 mL solution Take 4,000 mL by mouth once for 1 dose (Patient not taking: Reported on 3/22/2024) 4000 mL 0     No current facility-administered medications on file prior to visit.   No Known Allergies   Current Outpatient Medications on File Prior to Visit   Medication Sig Dispense Refill    pantoprazole (PROTONIX) 40 mg tablet Take 1 tablet (40 mg total) by mouth daily 30 tablet 11     [DISCONTINUED] bisacodyl (DULCOLAX) 5 mg EC tablet Take 2 tablets (10 mg total) by mouth once for 1 dose (Patient not taking: Reported on 3/22/2024) 2 tablet 0    [DISCONTINUED] escitalopram (Lexapro) 10 mg tablet Take 1 tablet (10 mg total) by mouth daily for 9 days (Patient not taking: Reported on 3/22/2024) 9 tablet 0    [DISCONTINUED] OLANZapine (ZyPREXA) 10 mg tablet Take 1 tablet (10 mg total) by mouth daily at bedtime (Patient not taking: Reported on 3/22/2024) 30 tablet 2    [DISCONTINUED] polyethylene glycol (GOLYTELY) 4000 mL solution Take 4,000 mL by mouth once for 1 dose (Patient not taking: Reported on 3/22/2024) 4000 mL 0     No current facility-administered medications on file prior to visit.      Social History     Tobacco Use    Smoking status: Every Day     Current packs/day: 1.00     Types: Cigarettes    Smokeless tobacco: Never    Tobacco comments:     Dealing with a lot of anxiety. Refer to Behavioral Health   Vaping Use    Vaping status: Never Used   Substance and Sexual Activity    Alcohol use: Never    Drug use: Never    Sexual activity: Never       Objective     /76 (BP Location: Left arm, Patient Position: Sitting, Cuff Size: Standard)   Pulse 82     Pertinent Labs:  Lab Results   Component Value Date    IRON 26 (L) 02/03/2023    TIBC 465 (H) 02/03/2023    FERRITIN 2 (L) 02/03/2023      Lab Results   Component Value Date    SODIUM 137 10/03/2023    K 3.7 10/03/2023     10/03/2023    CO2 25 10/03/2023    AGAP 8 10/03/2023    BUN 8 10/03/2023    CREATININE 0.66 10/03/2023    GLUC 91 10/03/2023    GLUF 92 09/22/2022    CALCIUM 9.4 10/03/2023    AST 11 (L) 10/03/2023    ALT 9 10/03/2023    ALKPHOS 62 10/03/2023    TP 7.8 10/03/2023    TBILI 0.83 10/03/2023    EGFR 128 10/03/2023     Lab Results   Component Value Date    WBC 11.27 (H) 10/03/2023    HGB 10.2 (L) 10/03/2023    HCT 34.4 (L) 10/03/2023    MCV 77 (L) 10/03/2023     10/03/2023       Pertinent Imaging/Test  Results:  None to review.     Physical Exam  Constitutional:       Appearance: Normal appearance.   HENT:      Head: Normocephalic and atraumatic.      Nose: Nose normal.      Mouth: Mucous membranes are moist.   Pulmonary:      Effort: Pulmonary effort is normal.    Skin:     General: Skin is warm and dry.   Psychiatric:        Affect normal.   Neurologic Exam     Mental Status   Oriented to person, place, and time.   Speech: speech is normal   Level of consciousness: alert  Able to name object.     Cranial Nerves   Cranial nerves II through XII intact.     Motor Exam   Muscle bulk: normal  Right arm pronator drift: absent  Left arm pronator drift: absent    Strength   Strength 5/5 throughout.     Sensory Exam   Light touch normal.     Gait, Coordination, and Reflexes     Gait  Gait: normal    Coordination   Romberg: negative  Finger to nose coordination: normal    Tremor   Resting tremor: absent         Administrative Statements   I have spent a total time of 45 minutes in caring for this patient on the day of the visit/encounter including Diagnostic results, Prognosis, Risks and benefits of tx options, Instructions for management, Patient and family education, Importance of tx compliance, Risk factor reductions, Impressions, Counseling / Coordination of care, Documenting in the medical record, Reviewing / ordering tests, medicine, procedures  , and Obtaining or reviewing history  .

## 2024-07-30 NOTE — ASSESSMENT & PLAN NOTE
She has been experiencing approximately 3-4 migraine headaches per week which started this past year.  She was not a headache person previously and she denies any trauma, concussions, or any changes to medications.  She has tried over the counter medications including ibuprofen, advil, and aspirin but these have not been effective. Headaches do not seem to be positional in nature. She does experience ongoing challenges with mood, particularly rapid changes to her mood along with anxiety and depression.  We discussed topamax, amitriptyline, and propranolol in office today.  Patient already experiences low blood pressures and admittedly has a hard time staying hydrated, so we decided not to start with propranolol today.  Mom and patient both interested in topamax or amitriptyline but ultimately decided on amitriptyline in hopes that it would also help with her mood.   Additional Workup:  Due to increased frequency and severity of headaches and migraines I recommend further evaluation with MRI brain without contrast to rule out structural or treatable causes of symptoms   Establish care with new psychiatrist and therapist that are in network (insurance changed)  Preventative:  We discussed headache hygiene and lifestyle factors that may improve headaches  Start amitriptyline 20mg daily for 1 week. May increase to 20mg nightly thereafter. Advised her to let me know immediately if she experiences any side effects or has any worsening anxiety/depression and to present to the nearest ER with thoughts of hurting herself or someone else  Past/ failed/contraindicated: none  Future options: Propranolol,Topamax, CGRP med, botox  Acute:  Discussed not taking over-the-counter or prescription pain medications more than 3 days per week to prevent medication overuse/rebound headache  Start Imitrex 100mg at the earliest onset of a migraine.  May repeat again in 2 hours if not completely headache free. No more than 2 tabs in 24  hours  Past/ failed/contraindicated: advil, ibuprofen, aspirin (ineffective)  Future options:  Other triptan (Maxalt), ubrelvy, reyvow, nurtec

## 2024-07-30 NOTE — PROGRESS NOTES
Review of Systems   Constitutional:  Positive for appetite change and fatigue (Very Tired all the time). Negative for fever.   HENT: Negative.  Negative for hearing loss, tinnitus, trouble swallowing and voice change.    Eyes:  Positive for visual disturbance (States that it like staticy and states has visual snow). Negative for photophobia and pain.   Respiratory: Negative.  Negative for shortness of breath.    Cardiovascular: Negative.  Negative for palpitations.   Gastrointestinal:  Positive for nausea. Negative for vomiting.   Endocrine: Negative.  Negative for cold intolerance.   Genitourinary: Negative.  Negative for dysuria, frequency and urgency.   Musculoskeletal:  Positive for gait problem (Stumbles and Freezing) and neck stiffness (At times). Negative for back pain, myalgias and neck pain.        Balance Issues     Skin: Negative.  Negative for rash.   Allergic/Immunologic: Negative.    Neurological:  Positive for dizziness and headaches (about 4 days a week, More so in the front of the head). Negative for tremors, seizures, syncope, facial asymmetry, speech difficulty, weakness, light-headedness and numbness.   Hematological: Negative.  Does not bruise/bleed easily.   Psychiatric/Behavioral:  Positive for sleep disturbance (Trouble going to sleep). Negative for confusion and hallucinations.         Memory Issues     All other systems reviewed and are negative.

## 2024-08-02 ENCOUNTER — TELEPHONE (OUTPATIENT)
Dept: FAMILY MEDICINE CLINIC | Facility: CLINIC | Age: 19
End: 2024-08-02

## 2024-08-02 DIAGNOSIS — F31.81 BIPOLAR II DISORDER (HCC): ICD-10-CM

## 2024-08-02 DIAGNOSIS — F33.3 SEVERE EPISODE OF RECURRENT MAJOR DEPRESSIVE DISORDER, WITH PSYCHOTIC FEATURES (HCC): Primary | ICD-10-CM

## 2024-08-02 NOTE — TELEPHONE ENCOUNTER
Called and spoke w pt -- informed of PCP note and stressed the importance of f/u on call/MCM from psych services. Pt was agreeable at time of call.

## 2024-08-05 NOTE — TELEPHONE ENCOUNTER
Call received from Pt mom. States that pt has not received a call from psych after referral was place. Mom is concerned as she feels pt needs to be seen sooner than September due to being off her medication. Advised that if pt is in crisis to seek care in ED. Please advise.

## 2024-08-05 NOTE — TELEPHONE ENCOUNTER
Called pt mother twice -- she did answer the phone as it did not go to voicemail but nothing was said despite introduction. Called back as it could have been issue w phone -- mother answered but did not speak. Had previously spoken w the patient -- Bethanie per previous note, which would explain why mother was not aware patient had scheduled w psych services.

## 2024-08-05 NOTE — TELEPHONE ENCOUNTER
I am confused. She dud get a phone call--She has an appointment with psychiatrist on 917 and therapist on 9/25. If she needs crisis she should go to ED-please advise her mom take her to ED for Crisis Intervention

## 2024-08-06 ENCOUNTER — PATIENT MESSAGE (OUTPATIENT)
Dept: NEUROLOGY | Facility: CLINIC | Age: 19
End: 2024-08-06

## 2024-08-07 NOTE — TELEPHONE ENCOUNTER
Called and spoke w pt mother -- she is now aware of appts set up and will call their office to see if there is sooner availability but I did inform her that the dates provided are most likely soonest available.

## 2024-08-08 ENCOUNTER — TELEPHONE (OUTPATIENT)
Age: 19
End: 2024-08-08

## 2024-08-08 NOTE — TELEPHONE ENCOUNTER
Per chart review, pt has been scheduled with services.      Pt has been removed from wait list.   
Speaking Coherently

## 2024-08-27 DIAGNOSIS — G43.009 MIGRAINE WITHOUT AURA AND WITHOUT STATUS MIGRAINOSUS, NOT INTRACTABLE: ICD-10-CM

## 2024-08-27 RX ORDER — SUMATRIPTAN 100 MG/1
TABLET, FILM COATED ORAL
Qty: 9 TABLET | Refills: 0 | Status: SHIPPED | OUTPATIENT
Start: 2024-08-27

## 2024-09-04 ENCOUNTER — HOSPITAL ENCOUNTER (OUTPATIENT)
Dept: MRI IMAGING | Facility: HOSPITAL | Age: 19
Discharge: HOME | End: 2024-09-04

## 2024-09-04 DIAGNOSIS — G43.009 MIGRAINE WITHOUT AURA AND WITHOUT STATUS MIGRAINOSUS, NOT INTRACTABLE: ICD-10-CM

## 2024-09-04 PROCEDURE — 70551 MRI BRAIN STEM W/O DYE: CPT

## 2024-09-10 ENCOUNTER — TELEPHONE (OUTPATIENT)
Dept: PSYCHIATRY | Facility: CLINIC | Age: 19
End: 2024-09-10

## 2024-09-11 NOTE — TELEPHONE ENCOUNTER
Pt returned MR's phone call regarding updated insurance. Pt stated there was a mishap but it was fixed. Pt's [facility] on file was updated to verify it is active. Message forwarded back to MR for review.

## 2024-09-17 ENCOUNTER — OFFICE VISIT (OUTPATIENT)
Dept: PSYCHIATRY | Facility: CLINIC | Age: 19
End: 2024-09-17
Payer: COMMERCIAL

## 2024-09-17 DIAGNOSIS — F41.0 GENERALIZED ANXIETY DISORDER WITH PANIC ATTACKS: Primary | ICD-10-CM

## 2024-09-17 DIAGNOSIS — F33.1 MAJOR DEPRESSIVE DISORDER, RECURRENT, MODERATE (HCC): ICD-10-CM

## 2024-09-17 DIAGNOSIS — F41.1 GENERALIZED ANXIETY DISORDER WITH PANIC ATTACKS: Primary | ICD-10-CM

## 2024-09-17 PROCEDURE — 90792 PSYCH DIAG EVAL W/MED SRVCS: CPT | Performed by: PSYCHIATRY & NEUROLOGY

## 2024-09-17 RX ORDER — ESCITALOPRAM OXALATE 10 MG/1
TABLET ORAL
Qty: 23 TABLET | Refills: 0 | Status: SHIPPED | OUTPATIENT
Start: 2024-09-17 | End: 2024-09-26 | Stop reason: SINTOL

## 2024-09-17 NOTE — PSYCH
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 tentatively diagnose the patient with major depressive disorder, recurrent currently moderate in severity and generalized anxiety disorder with panic attacks.  The patient was diagnosed with bipolar disorder type II in the past but at this time from evaluation is not very clear if the patient is meeting criteria for it.  The patient endorses some hypomanic symptoms which she blames it all secondary to coffee intake and also symptoms not lasting more than a day.  Though the patient does have a high risk of bipolar disorder given her family psychiatric history.  At this time I will continue with her current diagnosis of Major Depression but we will continue to evaluate the patient over the next follow-up visits for confirming a ruling out bipolar disorder.  The patient also has been made aware about the symptoms again in detail and to call my office if she experiences any of those episodes in the future.  The patient agreed the patient also has a history of significant social anxiety and possibly might meet the criteria for social anxiety disorder but at this time we will continue to evaluate.  She does not seem to meet the criteria for OCD.  She does  have a history of significant verbal and emotional, but does not endorse symptoms to meet the criteria for PTSD.  The patient  endorsing visual hallucination and paranoia though denies any auditory hallucination.  It seems from her evaluation more due to underlying anxiety than gross psychosis but will be continued to be evaluated and if needed can be started on antipsychotic in the future.  She denies any alcohol or substance use nowadays.  Reports her parents have been sober for many years and they have been very good support.  The major stressor has been her current living situation.  Denies any other concerns.      Plan at this time is to start the patient on Lexapro for management of depression and anxiety at the dose and schedule mentioned below.  Patient reports that when she was started on in the past it was very helpful in the beginning but then she was not very consistent in taking due to which she felt depressed again.  The patient at this time takes elavil  for her migraine but was also educated about its benefit in depression and sleep.  Though the patient reported her doctor had planned for tapering her off soon and if it does then I can consider other alternatives to help her with sleep and mood.  The patient educated the Lexapro in detail including benefit, risk, side effect, alternative, contraindication, dosage and frequency.  She was specially educated about risk of manic or hypomanic symptoms on antidepressant and to call me if she noticed any.  She also has been educated about risk of serotonin syndrome given she is also on a level and to call crisis or 911 if she notices any of his symptoms.  The patient will follow up with me in 4 weeks or sooner if needed.  She was advised to call us if any concern and to call crisis, 911 or visit nearby ER in case of any emergency or having any SI or HI.  The patient verbalized understanding agrees with the plan.  .     Diagnoses and all orders for this  visit:    Generalized anxiety disorder with panic attacks  -     escitalopram (Lexapro) 10 mg tablet; Take 0.5 tablets (5 mg total) by mouth daily for 14 days, THEN 1 tablet (10 mg total) daily for 16 days.    Major depressive disorder, recurrent, moderate (HCC)  -     escitalopram (Lexapro) 10 mg tablet; Take 0.5 tablets (5 mg total) by mouth daily for 14 days, THEN 1 tablet (10 mg total) daily for 16 days.          Treatment Recommendations:    Check Assessment/Plan section for details.    Risks/Benefits/Precautions:      Risks, Benefits And Possible Side Effects Of Medications:    Risks, benefits, and possible side effects of medications explained to Bethanie and she verbalizes understanding and agreement for treatment.  Risks of medications in pregnancy explained to Bethanie. She verbalizes understanding and agrees to notify her doctor if she becomes pregnant.    Controlled Medication Discussion:     Not applicable    Treatment Plan;    Completed and signed during the session: Yes - with Bethanie    Visit Time    Visit Start Time: 10:08 AM  Visit Stop Time: 11:13 AM  Total Visit Duration:  65 minutes    Madison Pérez MD 09/17/24

## 2024-09-17 NOTE — BH TREATMENT PLAN
TREATMENT PLAN (Medication Management Only)        [facility]    Name and Date of Birth:  Bethanie Cronin 18 y.o. 2005  Date of Treatment Plan: September 17, 2024  Diagnosis/Diagnoses:    1. Generalized anxiety disorder with panic attacks    2. Major depressive disorder, recurrent, moderate (HCC)      Strengths/Personal Resources for Self-Care: supportive family, supportive friends, taking medications as prescribed, ability to adapt to life changes, ability to communicate needs, ability to communicate well, ability to listen, ability to reason, ability to understand psychiatric illness, average or above intelligence.  Area/Areas of need (in own words): anxiety, depression  1. Long Term Goal: Feel better about self.  Target Date:6 months - 3/17/2025  Person/Persons responsible for completion of goal: Bethanie  2.  Short Term Objective (s) - How will we reach this goal?:   A. Provider new recommended medication/dosage changes and/or continue medication(s): continue current medications as prescribed.  B. N/A.  C. N/A.  Target Date:6 months - 3/17/2025  Person/Persons Responsible for Completion of Goal: Bethanie  Progress Towards Goals: starting treatment  Treatment Modality: medication management every 4 weeks  Review due 180 days from date of this plan: 6 months - 3/17/2025  Expected length of service: ongoing treatment  My Physician/PA/NP and I have developed this plan together and I agree to work on the goals and objectives. I understand the treatment goals that were developed for my treatment.       José Miguel Orlando  PEDIATRICS  [address] [place] [address]  Phone: [phone]  Fax: [phone]  Follow Up Time: 1-3 days

## 2024-09-20 ENCOUNTER — TELEPHONE (OUTPATIENT)
Age: 19
End: 2024-09-20

## 2024-09-20 ENCOUNTER — TELEMEDICINE (OUTPATIENT)
Dept: PSYCHIATRY | Facility: CLINIC | Age: 19
End: 2024-09-20

## 2024-09-20 DIAGNOSIS — F33.1 MAJOR DEPRESSIVE DISORDER, RECURRENT, MODERATE (HCC): ICD-10-CM

## 2024-09-20 DIAGNOSIS — F41.0 GENERALIZED ANXIETY DISORDER WITH PANIC ATTACKS: Primary | ICD-10-CM

## 2024-09-20 DIAGNOSIS — F41.1 GENERALIZED ANXIETY DISORDER WITH PANIC ATTACKS: Primary | ICD-10-CM

## 2024-09-20 NOTE — TELEPHONE ENCOUNTER
Writer received warm transfer from Refill line requesting for Intake to schedule a sooner appt due to pt being in distress from the medication change that occurred on 9/17 with Dr. Pérez.    Symptoms: chills, feeling really cold, emotional dysregulation, more agitation, pt has been feeling pretty weak, dizziness, and not been feeling good, and has not eaten in a couple of days.    Pt has been scheduled for virtual appt with Dr. Pérez 9/20 @ 330pm.    Writer was unable to warm transfer pt to nurse's line.  Writer sent updated encounter to Nurses [place], [place] office, and Dr. Pérez.

## 2024-09-20 NOTE — TELEPHONE ENCOUNTER
"From the other Encounter today:   Patient stated she is experiencing \"pretty bothersome side effects\". Chills, aggression, irritated, feeling physically weak. Poor appetite - bare ate in two days.    Patient does sound very upset, like she was crying. She stated she is not feeling suicidal at all. I did transfer patient to the behavioral health intake line to see if she can find a sooner appointment to discuss this.     This nurse spoke with Bethanie. She confirmed the symptoms as noted. She started taking Lexapro 9/18 in the AM. She felt poorly shortly after and had chills. She stopped eating that day, feeling nauseated and poor appetite. She has not vomited or had diarrhea. She is able to take fluids. She denies other symptoms of illness/being sick.     She will have a virtul visit with Dr. Pérez this afternoon.  "

## 2024-09-20 NOTE — PSYCH
Virtual Regular Visit    Verification of patient location:    Patient is located at Home in the following state in which I hold an active license [place]      Assessment/Plan:    Problem List Items Addressed This Visit          Behavioral Health    Generalized anxiety disorder with panic attacks - Primary    Major depressive disorder, recurrent, moderate (HCC)       Goals addressed in session: Goal 1 and Goal 2          Reason for visit is No chief complaint on file.       Encounter provider Madison Pérez MD      Recent Visits  Date Type Provider Dept   09/17/24 Office Visit Madison Pérez MD [facility]   Showing recent visits within past 7 days and meeting all other requirements  Today's Visits  Date Type Provider Dept   09/20/24 Telemedicine Madison Pérez MD [facility]   Showing today's visits and meeting all other requirements  Future Appointments  No visits were found meeting these conditions.  Showing future appointments within next 150 days and meeting all other requirements       The patient was identified by name and date of birth. Bethanie Cronin was informed that this is a telemedicine visit and that the visit is being conducted throughthe Epic Embedded platform. She agrees to proceed..  My office door was closed. No one else was in the room.  She acknowledged consent and understanding of privacy and security of the video platform. The patient has agreed to participate and understands they can discontinue the visit at any time.    Patient is aware this is a billable service.     Subjective  See below      HPI     Past Medical History:   Diagnosis Date    Anemia     Anxiety     Depression     Frequent headaches 05/20/2021       History reviewed. No pertinent surgical history.    Current Outpatient Medications   Medication Sig Dispense Refill    amitriptyline (ELAVIL) 10 mg tablet start 10mg at bedtime. Increase by 10mg each week until good effect on  headaches/pain or reach 50mg daily 150 tablet 1    escitalopram (Lexapro) 10 mg tablet Take 0.5 tablets (5 mg total) by mouth daily for 14 days, THEN 1 tablet (10 mg total) daily for 16 days. 23 tablet 0    pantoprazole (PROTONIX) 40 mg tablet Take 1 tablet (40 mg total) by mouth daily 30 tablet 11    LORazepam (ATIVAN) 1 mg tablet Take 1 tablet (1 mg total) by mouth 60 minutes pre-procedure May repeat x1 30 mins prior if needed (Patient not taking: Reported on 9/17/2024) 2 tablet 0    SUMAtriptan (IMITREX) 100 mg tablet PLEASE SEE ATTACHED FOR DETAILED DIRECTIONS (Patient not taking: Reported on 9/17/2024) 9 tablet 0     No current facility-administered medications for this visit.        No Known Allergies    Review of Systems    Video Exam    There were no vitals filed for this visit.    Physical Exam     Visit Time    Visit Start Time: 3:34 PM  Visit Stop Time: 3:52 PM  Total Visit Duration:  18 minutes    MEDICATION MANAGEMENT NOTE        [facility]      Name and Date of Birth:  Bethanie Cronin 18 y.o. 2005 MRN: [id number]    Date of Visit: September 20, 2024    Reason for Visit: Concerns about side effects    Subjective: The patient had followed with me for initial psychiatric evaluation 3 days back.  The patient was tentatively diagnosed with major depressive disorder, moderate in severity along with generalized anxiety disorder with panic attacks.  The patient was already taking Elavil 10 mg at bedtime for her migraine.  She was started on Lexapro starting at 5 mg daily for 2 weeks and then increase to 10 mg daily for management of depression and anxiety.  The patient had reported being on Lexapro in the past prescribed by her previous prescriber.  And it was effective though was not consistent with the medication and had subsequently stopped taking it.  The patient called us today and talked to the nurse about having side effects and was scheduled  appointment with me today.  The patient reported to me that since starting the medication Lexapro 2 days back she has been having poor appetite, nausea,  having chills, worsening of mood specially feeling agitated, depressed and at times dizzy.  She also reported occasional twitching of her arms and her upper chest.  She though denies any other abnormal involuntary movement.  She denied any confusion, muscle stiffness, disorientation, elevated temperature, palpitation or racing heart.  Denies any other GI symptoms such as diarrhea or throwing up.  Denies any other side effects other than mentioned above.  Reports her mood has been still depressed but denies any suicidal thoughts or any urges to hurt others.  She also denied feeling suicidal. Reports her sleep was not good for the first couple of nights but  slept well last night.  Reports her anxiety is mild.  She denies any chest pain or any difficulty breathing.  She reports she feels her side effect has been slightly better today and manageable.  Denied any other concerns.  Denies any auditory or visual hallucination.  Does not endorse any paranoia or delusional ideation.  Denies any other medical concerns.  Denies any signs or symptoms for infection other than having chills.        Review Of Systems: Negative other than mentioned above      Constitutional negative   ENT negative   Cardiovascular negative   Respiratory negative   Gastrointestinal negative   Genitourinary negative   Musculoskeletal negative   Integumentary negative   Neurological negative   Endocrine negative   Other Symptoms none         Suicide/Homicide Risk Assessment:    Risk of Harm to Self:  The following ratings are based on assessment at the time of the interview  Demographic risk factors include: , age: young adult (15-24)  Historical Risk Factors include: history of depression, history of anxiety  Recent Specific Risk Factors include: diagnosis of depression, current anxiety  symptoms  Protective Factors: no current suicidal ideation, access to mental health treatment, compliant with medications, compliant with mental health treatment, supportive father and mother, supportive family  Weapons: none and no firearms. The following steps have been taken to ensure weapons are properly secured: not applicable  Based on today's assessment, Bethanie presents the following risk of harm to self: minimal    Risk of Harm to Others:  The following ratings are based on assessment at the time of the interview  Based on today's assessment, Bethanie presents the following risk of harm to others: none    The following interventions are recommended: contracts for safety at present - agrees to go to ED if feeling unsafe, contracts for safety at present - agrees to call Crisis Intervention Service if feeling unsafe    Alcohol/Substance Abuse: Denies        Past Medical History:    Past Medical History:   Diagnosis Date    Anemia     Anxiety     Depression     Frequent headaches 05/20/2021        History reviewed. No pertinent surgical history.  No Known Allergies    Current Medications:       Current Outpatient Medications:     amitriptyline (ELAVIL) 10 mg tablet, start 10mg at bedtime. Increase by 10mg each week until good effect on headaches/pain or reach 50mg daily, Disp: 150 tablet, Rfl: 1    escitalopram (Lexapro) 10 mg tablet, Take 0.5 tablets (5 mg total) by mouth daily for 14 days, THEN 1 tablet (10 mg total) daily for 16 days., Disp: 23 tablet, Rfl: 0    pantoprazole (PROTONIX) 40 mg tablet, Take 1 tablet (40 mg total) by mouth daily, Disp: 30 tablet, Rfl: 11    LORazepam (ATIVAN) 1 mg tablet, Take 1 tablet (1 mg total) by mouth 60 minutes pre-procedure May repeat x1 30 mins prior if needed (Patient not taking: Reported on 9/17/2024), Disp: 2 tablet, Rfl: 0    SUMAtriptan (IMITREX) 100 mg tablet, PLEASE SEE ATTACHED FOR DETAILED DIRECTIONS (Patient not taking: Reported on 9/17/2024), Disp: 9 tablet,  Rfl: 0       History Review: The following portions of the patient's history were reviewed and updated as appropriate: allergies, current medications, past family history, past medical history, past social history, past surgical history, and problem list.         OBJECTIVE:     Vital signs in last 24 hours:    There were no vitals filed for this visit.    Mental Status Evaluation:    Appearance age appropriate, casually dressed   Behavior cooperative, calm   Speech normal rate, normal volume, normal pitch   Mood depressed, anxious   Affect mildly constricted   Thought Processes organized, goal directed   Associations intact associations   Thought Content no overt delusions   Perceptual Disturbances: no auditory hallucinations, no visual hallucinations   Abnormal Thoughts  Risk Potential Suicidal ideation - None  Homicidal ideation - None  Potential for aggression - No   Orientation oriented to person, place, time/date, and situation   Memory recent and remote memory grossly intact   Consciousness alert and awake   Attention Span Concentration Span attention span and concentration are age appropriate   Intellect appears to be of average intelligence   Insight intact   Judgement intact   Muscle Strength and  Gait unable to assess today due to virtual visit   Motor activity unable to assess today due to virtual visit   Language no difficulty naming common objects, no difficulty repeating a phrase   Fund of Knowledge adequate knowledge of current events  adequate fund of knowledge regarding past history  adequate fund of knowledge regarding vocabulary    Pain none   Pain Scale 0       Laboratory Results: Most Recent Labs:   Lab Results   Component Value Date    WBC 11.27 (H) 10/03/2023    RBC 4.49 10/03/2023    HGB 10.2 (L) 10/03/2023    HCT 34.4 (L) 10/03/2023     10/03/2023    RDW 14.9 10/03/2023    NEUTROABS 8.08 (H) 10/03/2023    SODIUM 137 10/03/2023    K 3.7 10/03/2023     10/03/2023    CO2 25  10/03/2023    BUN 8 10/03/2023    CREATININE 0.66 10/03/2023    CALCIUM 9.4 10/03/2023    AST 11 (L) 10/03/2023    ALT 9 10/03/2023    ALKPHOS 62 10/03/2023    TP 7.8 10/03/2023    TBILI 0.83 10/03/2023    CHOLESTEROL 111 09/22/2022    TRIG 79 09/22/2022    HDL 47 (L) 09/22/2022    LDLCALC 48 09/22/2022    NONHDLC 64 09/22/2022    NEM5AGAYGYTQ 2.720 09/22/2022     I have personally reviewed all pertinent laboratory/tests results.    Assessment/Plan: Patient endorsing symptoms which probably is due to the side effects from Lexapro.  She does not endorse any signs or symptoms of serotonin syndrome.  Also other than feeling irritable and agitated which though she said has been getting better today no other symptoms of dileep or hypomania noted.   she reports her sleep was not good first couple of days but has been back to normal today.  No SI or HI on the medication.  It also seems her side effects are little better today compared to  first 2 days.  At this time I recommended continuing with Lexapro over the next couple of days and updating us on Monday how she feels.  She was advised in case she feels either worsening of symptoms or not having any changes then she can immediately discontinue Lexapro.  The patient though also was specially advised in case she feels having suicidal thoughts or feeling  mood being elevated, high energy level, irritability along with sleep again getting very poor day and to also discontinue the medication immediately.  The patient also was encouraged to have the blood work done which was ordered by her other physician in March.  Patient agreed.  She will again follow up with me in 3 to 4 weeks or sooner if needed.  As mentioned above she was encouraged to call us on Monday to update us how she overall feels.  She also was advised to call [facility], 911 or visit nearby ER in case of any emergency or having any SI or HI.  The patient agreed.      Diagnoses and all orders for this  visit:    Generalized anxiety disorder with panic attacks    Major depressive disorder, recurrent, moderate (HCC)          Treatment Recommendations/Precautions:      Aware of 24 hour and weekend coverage for urgent situations accessed by calling [facility] main practice number    Risks/Benefits      Risks, Benefits And Possible Side Effects Of Medications:    Risks, benefits, and possible side effects of medications explained to Bethanie and she verbalizes understanding and agreement for treatment.  Risks of medications in pregnancy explained to Bethanie. She verbalizes understanding and agrees to notify her doctor if she becomes pregnant.    Controlled Medication Discussion:     Not applicable    Psychotherapy Provided:     Individual psychotherapy provided: Medications, treatment progress and treatment plan reviewed with Bethanie.  Medication education provided to Bethanie.  Reassurance and supportive therapy provided.   Crisis/safety plan discussed with Bethanie.     Treatment Plan;    Completed and signed during the session: Not applicable - Treatment Plan not due at this session        Madison Pérez MD 09/20/24

## 2024-09-20 NOTE — TELEPHONE ENCOUNTER
"Patient called the RX Refill Line.  Message is being forwarded to the office.     Patient stated she is experiencing \"pretty bothersome side effects\".  Chills, aggression, irritated, feeling physically weak. Poor appetite - bare ate in two days.    Patient does sound very upset, like she was crying. She stated she is not feeling suicidal at all. I did transfer patient to the behavioral health intake line to see if she can find a sooner appointment to discuss this.    Please contact patient at   [phone]      "

## 2024-09-22 ENCOUNTER — APPOINTMENT (OUTPATIENT)
Dept: LAB | Age: 19
End: 2024-09-22
Payer: COMMERCIAL

## 2024-09-22 DIAGNOSIS — D64.9 ANEMIA, UNSPECIFIED TYPE: ICD-10-CM

## 2024-09-22 DIAGNOSIS — Z13.89 SCREENING FOR BLOOD OR PROTEIN IN URINE: ICD-10-CM

## 2024-09-22 DIAGNOSIS — R53.82 CHRONIC FATIGUE, UNSPECIFIED: ICD-10-CM

## 2024-09-22 DIAGNOSIS — R11.2 NAUSEA AND VOMITING, UNSPECIFIED VOMITING TYPE: ICD-10-CM

## 2024-09-22 DIAGNOSIS — E63.9 POOR DIET: ICD-10-CM

## 2024-09-22 DIAGNOSIS — F33.3 SEVERE EPISODE OF RECURRENT MAJOR DEPRESSIVE DISORDER, WITH PSYCHOTIC FEATURES (HCC): ICD-10-CM

## 2024-09-22 LAB
25(OH)D3 SERPL-MCNC: 8.8 NG/ML (ref 30–100)
ALBUMIN SERPL BCG-MCNC: 4.6 G/DL (ref 3.5–5)
ALP SERPL-CCNC: 58 U/L (ref 34–104)
ALT SERPL W P-5'-P-CCNC: 10 U/L (ref 7–52)
AMORPH URATE CRY URNS QL MICRO: ABNORMAL
ANION GAP SERPL CALCULATED.3IONS-SCNC: 11 MMOL/L (ref 4–13)
AST SERPL W P-5'-P-CCNC: 11 U/L (ref 13–39)
BACTERIA UR QL AUTO: ABNORMAL /HPF
BASOPHILS # BLD AUTO: 0.03 THOUSANDS/ΜL (ref 0–0.1)
BASOPHILS NFR BLD AUTO: 1 % (ref 0–1)
BILIRUB SERPL-MCNC: 0.73 MG/DL (ref 0.2–1)
BILIRUB UR QL STRIP: NEGATIVE
BUN SERPL-MCNC: 9 MG/DL (ref 5–25)
CALCIUM SERPL-MCNC: 9.2 MG/DL (ref 8.4–10.2)
CHLORIDE SERPL-SCNC: 104 MMOL/L (ref 96–108)
CLARITY UR: ABNORMAL
CO2 SERPL-SCNC: 24 MMOL/L (ref 21–32)
COLOR UR: ABNORMAL
CREAT SERPL-MCNC: 0.56 MG/DL (ref 0.6–1.3)
EOSINOPHIL # BLD AUTO: 0.03 THOUSAND/ΜL (ref 0–0.61)
EOSINOPHIL NFR BLD AUTO: 1 % (ref 0–6)
ERYTHROCYTE [DISTWIDTH] IN BLOOD BY AUTOMATED COUNT: 14.8 % (ref 11.6–15.1)
FERRITIN SERPL-MCNC: 2 NG/ML (ref 11–307)
GFR SERPL CREATININE-BSD FRML MDRD: 134 ML/MIN/1.73SQ M
GLUCOSE P FAST SERPL-MCNC: 96 MG/DL (ref 65–99)
GLUCOSE UR STRIP-MCNC: NEGATIVE MG/DL
HCT VFR BLD AUTO: 32.1 % (ref 34.8–46.1)
HGB BLD-MCNC: 9.5 G/DL (ref 11.5–15.4)
HGB UR QL STRIP.AUTO: NEGATIVE
IMM GRANULOCYTES # BLD AUTO: 0.02 THOUSAND/UL (ref 0–0.2)
IMM GRANULOCYTES NFR BLD AUTO: 0 % (ref 0–2)
IRON SATN MFR SERPL: 4 % (ref 15–50)
IRON SERPL-MCNC: 18 UG/DL (ref 50–212)
KETONES UR STRIP-MCNC: ABNORMAL MG/DL
LEUKOCYTE ESTERASE UR QL STRIP: ABNORMAL
LYMPHOCYTES # BLD AUTO: 1.83 THOUSANDS/ΜL (ref 0.6–4.47)
LYMPHOCYTES NFR BLD AUTO: 30 % (ref 14–44)
MCH RBC QN AUTO: 23 PG (ref 26.8–34.3)
MCHC RBC AUTO-ENTMCNC: 29.6 G/DL (ref 31.4–37.4)
MCV RBC AUTO: 78 FL (ref 82–98)
MONOCYTES # BLD AUTO: 0.59 THOUSAND/ΜL (ref 0.17–1.22)
MONOCYTES NFR BLD AUTO: 10 % (ref 4–12)
MUCOUS THREADS UR QL AUTO: ABNORMAL
NEUTROPHILS # BLD AUTO: 3.52 THOUSANDS/ΜL (ref 1.85–7.62)
NEUTS SEG NFR BLD AUTO: 58 % (ref 43–75)
NITRITE UR QL STRIP: NEGATIVE
NON-SQ EPI CELLS URNS QL MICRO: ABNORMAL /HPF
NRBC BLD AUTO-RTO: 0 /100 WBCS
PH UR STRIP.AUTO: 6 [PH]
PLATELET # BLD AUTO: 290 THOUSANDS/UL (ref 149–390)
PMV BLD AUTO: 10.6 FL (ref 8.9–12.7)
POTASSIUM SERPL-SCNC: 3.8 MMOL/L (ref 3.5–5.3)
PROT SERPL-MCNC: 7.4 G/DL (ref 6.4–8.4)
PROT UR STRIP-MCNC: ABNORMAL MG/DL
RBC # BLD AUTO: 4.13 MILLION/UL (ref 3.81–5.12)
RBC #/AREA URNS AUTO: ABNORMAL /HPF
SODIUM SERPL-SCNC: 139 MMOL/L (ref 135–147)
SP GR UR STRIP.AUTO: 1.03 (ref 1–1.03)
TIBC SERPL-MCNC: 442 UG/DL (ref 250–450)
TSH SERPL DL<=0.05 MIU/L-ACNC: 1.42 UIU/ML (ref 0.45–4.5)
UIBC SERPL-MCNC: 424 UG/DL (ref 155–355)
UROBILINOGEN UR STRIP-ACNC: 2 MG/DL
VIT B12 SERPL-MCNC: 307 PG/ML (ref 180–914)
WBC # BLD AUTO: 6.02 THOUSAND/UL (ref 4.31–10.16)
WBC #/AREA URNS AUTO: ABNORMAL /HPF

## 2024-09-22 PROCEDURE — 83540 ASSAY OF IRON: CPT

## 2024-09-22 PROCEDURE — 36415 COLL VENOUS BLD VENIPUNCTURE: CPT

## 2024-09-22 PROCEDURE — 82306 VITAMIN D 25 HYDROXY: CPT

## 2024-09-22 PROCEDURE — 85025 COMPLETE CBC W/AUTO DIFF WBC: CPT

## 2024-09-22 PROCEDURE — 87086 URINE CULTURE/COLONY COUNT: CPT

## 2024-09-22 PROCEDURE — 87147 CULTURE TYPE IMMUNOLOGIC: CPT

## 2024-09-22 PROCEDURE — 83550 IRON BINDING TEST: CPT

## 2024-09-22 PROCEDURE — 82607 VITAMIN B-12: CPT

## 2024-09-22 PROCEDURE — 81001 URINALYSIS AUTO W/SCOPE: CPT

## 2024-09-22 PROCEDURE — 82728 ASSAY OF FERRITIN: CPT

## 2024-09-23 ENCOUNTER — TELEPHONE (OUTPATIENT)
Dept: PSYCHIATRY | Facility: CLINIC | Age: 19
End: 2024-09-23

## 2024-09-23 LAB
BACTERIA UR CULT: ABNORMAL
BACTERIA UR CULT: ABNORMAL

## 2024-09-23 NOTE — TELEPHONE ENCOUNTER
Returned Bethanie's call.  Requested she contact her PCP today to review lab results due to possible urine infection and low lab values.  Informed her provider would like her to discuss with her PCP as some of her symptoms could be a result of the UTI and not the Lexapro.  Informed her that other options will be discussed at next office visit and instructed her to call the office with any concerns.

## 2024-09-23 NOTE — TELEPHONE ENCOUNTER
Patient wanting to forward message to Dr. Pérez that she did stop taking the lexapro and also had lab work completed yesterday-results in chart.

## 2024-09-24 ENCOUNTER — TELEPHONE (OUTPATIENT)
Age: 19
End: 2024-09-24

## 2024-09-24 NOTE — TELEPHONE ENCOUNTER
Bethanie Cronin and/or patient's mother requested a call back to discuss some things with provider in regard to patient.    They can be reached at P# [phone].       Thank you.

## 2024-09-25 ENCOUNTER — OFFICE VISIT (OUTPATIENT)
Dept: BEHAVIORAL/MENTAL HEALTH CLINIC | Facility: CLINIC | Age: 19
End: 2024-09-25
Payer: COMMERCIAL

## 2024-09-25 ENCOUNTER — DOCUMENTATION (OUTPATIENT)
Dept: PSYCHIATRY | Facility: CLINIC | Age: 19
End: 2024-09-25

## 2024-09-25 DIAGNOSIS — F33.2 MDD (MAJOR DEPRESSIVE DISORDER), RECURRENT SEVERE, WITHOUT PSYCHOSIS (HCC): ICD-10-CM

## 2024-09-25 DIAGNOSIS — F40.01 PANIC DISORDER WITH AGORAPHOBIA: Primary | ICD-10-CM

## 2024-09-25 PROCEDURE — 90791 PSYCH DIAGNOSTIC EVALUATION: CPT | Performed by: COUNSELOR

## 2024-09-25 NOTE — PROGRESS NOTES
TRANSFER SUMMARY    [facility]    Patient Name Bethanie Cronin     Date of Birth: 18 y.o. 2005      MRN: [id number]    Admission Date:  9/25/2024    Date of Transfer: September 25, 2024    Admission Diagnosis:     Major Depressive Disorder, single, severe without psychotic features  Panic Disorder with Agoraphobia    Current Diagnosis:     No diagnosis found.    Reason for Admission: Bethanie presented for treatment due to depressive symptoms and anxiety symptoms. Primary complaints included DEPRESSIVE SYMPTOMS: depressed mood, hopelessness, low energy, low motivation, decreased interest, suicidal ideation and ANXIETY SYMPTOMS: feeling nervous, racing thoughts, panic attacks, panic attacks (with feeling of impending doom, dizziness, numbness and tingling, chest pain).     Progress in Treatment: Bethanie was seen for  Intake . During the course of treatment she n/a.    Episodes of Higher Level of Care: No    Transfer request Initiated by: Psychiatrist: Kena Therapist: Isaias Tolliver    Reason for Transfer Request: scheduling conflicts    Does this individual need a clinician with specialized training/expertise?: No    Is this client working with any other \[facility]\"" Providers/Therapists? Psychiatrist: None Therapist:  Chris Hickman    Other pertinent issues: None    Are there any specific individuals who would be a “best fit” or who have already agreed to accept this transfer request?      Psychiatrist: None   Therapist:  Chris Hickman  Rationale:  Has availability    Attempts to maintain the current therapeutic relationship: Not Applicable    Transfer request routed to Clinical Supervisor for input and/or approval.         Isaias Tolliver09/25/24

## 2024-09-25 NOTE — TELEPHONE ENCOUNTER
Spoke with Mom who says she is really worried about Bethanie. Bethanie was on lexapro in the past but had to stop due to side effects. At the last appointment Mom said no medications were added so she wanted to make sure Dr. Pérez knew the extent of the symptoms Bethanie is experiencing. They have tried to wait for the next appt. On the 15th but can't anymore. Mom has taken her to walk in centers and tried to get in to see her family doctor while waiting for October 15th. Bethanie has had increased depression, hopelessness, social anxiety, and paranoia. Sometimes she wakes up her mom in the middle of the night with severe panic attacks and paranoid thinking people are out to get her. At this time she is not a threat to herself or others. She says that they do not want to wait the appointment to have a medication change. Clinical will follow up as advised.
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
## 2024-09-25 NOTE — TELEPHONE ENCOUNTER
Mother of patient called in returning a call from the nurses regarding the patient.    Mother states they are off for the remainder of the day for a call.  The best telephone number is [phone]

## 2024-09-26 DIAGNOSIS — F41.0 GENERALIZED ANXIETY DISORDER WITH PANIC ATTACKS: Primary | ICD-10-CM

## 2024-09-26 DIAGNOSIS — F33.1 MAJOR DEPRESSIVE DISORDER, RECURRENT, MODERATE (HCC): ICD-10-CM

## 2024-09-26 DIAGNOSIS — F41.1 GENERALIZED ANXIETY DISORDER WITH PANIC ATTACKS: Primary | ICD-10-CM

## 2024-09-26 RX ORDER — VENLAFAXINE HYDROCHLORIDE 37.5 MG/1
37.5 CAPSULE, EXTENDED RELEASE ORAL DAILY
Qty: 30 CAPSULE | Refills: 0 | Status: SHIPPED | OUTPATIENT
Start: 2024-09-26

## 2024-09-26 NOTE — PROGRESS NOTES
As per the previous messages, the patient has stopped taking Lexapro due to the side effects.  I through the nurse had reviewed other alternative option including Effexor XR for management of depression and anxiety.  Patient mom had also called stating that the patient cannot wait until her next appointment to review other options and was given the option of Effexor XR.  I through the nurse also reviewed benefits, risks, side effects of the Effexor XR and the patient and her mother was agreeable for trying the Effexor.  The patient will be started on Effexor XR 37.5 mg 1 capsule daily by mouth every morning for management of depression anxiety.  They were advised to call us if there is any concern and to call [facility], 911 or visit nearby ER in case of any emergency.  The nurse also again reviewed with the patient to see her primary care physician soon given her recent blood work indicates possible UTI along with her CBC showing abnormal values specially low hemoglobin.  They agreed to see Dr. Ratliff through telehealth today.

## 2024-09-26 NOTE — TELEPHONE ENCOUNTER
Spoke with Mom and Bethanie. Bethanie hasn't been in to see her PCP yet, she has an appointment on October 9th. She said she isn't in abdominal pain and has no back pain, but has some burning when she urinates. I encuraged her to do a telehealth call with a doctor tonight (mom said that was possible) as she might have a UTI and need an antibiotic. She also is agreeable to trying effexor XR. Benefits and possible side effects reviewed with her. She has no questions at this time.

## 2024-09-27 NOTE — PATIENT INSTRUCTIONS
Patient Instructions:    Additional Testing  No additional work up required at this time  Can consider further work up for memory impairment if symptoms persist    Headache/migraine treatment:     Prevention  -To take every day to help prevent headaches - not to take at the time of headache:  We will hold off on a daily preventative at this time since she is not currently experiencing any headaches  Recommend melatonin at bedtime to assist with sleep 1-3mg at bedtime    Abortive  -For immediate treatment of a headache/migraine  -For your more moderate to severe migraines take this medication as early as possible:  -Continue Imitrex 100mg at the onset of a headache.  May take a second one two hours later if not completely headache free.   -It is ok to take ibuprofen, acetaminophen or naproxen (Advil, Tylenol,  Aleve, Excedrin) if they help your headaches you should limit these to No more than 3 times a week to avoid medication overuse/rebound headaches.     Lifestyle Recommendations:    -Remain well-hydrated drinking at least 48 to 64 ounces of noncaffeinated beverages per day in addition to anything caffeinated.    -Getting adequate rest is also very important for migraine prevention (aim for 7-8 hours per night).     -Regular exercise is also beneficial for headache prevention.  I would encourage at the least 5 days of physical exercise weekly for at least 30 minutes.   -I would like for them to keep track of their migraines using an application on their phone or calendar as they see fit. Phone applications: Migraine Luis Alfredo or Migraine Diary.    Education and Follow-up:    -Please call or send a [employer] message with any questions or concerns. Please present to the emergency room with any concerning symptoms such as: worst headache of your life, sudden painless loss of vision or double vision, difficulty speaking or swallowing, vertigo/room spinning that does not quickly resolve, or weakness/numbness/loss of  coordination affecting 1 side of the face or body.  -Follow up in 6 months or sooner if needed.

## 2024-09-27 NOTE — PROGRESS NOTES
Ambulatory Visit  Name: Bethanie Cronin      : 2005      MRN: [id number]  Encounter Provider: MANNY Guadarrama  Encounter Date: 2024   Encounter department: NEUROLOGY [facility]    Assessment & Plan   1. Migraine without aura and without status migrainosus, not intractable  Assessment & Plan:  Since her last office visit she is doing well from a headache perspective. She is not taking the amitriptyline secondary to side effects.  She has not needed to use the Imitrex because she has not experienced any severe headaches since her last office visit.  She has recently started Effexor but has noticed a decrease in her headache frequency even prior to starting that medication.  She feels stable from a mood perspective and notes improvement with Effexor.  She reports some short-term memory issues. MRI brain came back without structural abnormalities.  She denies getting lost or having trouble recognizing familiar faces.  She isn't sleeping very well.  She currently has a urinary tract infection and also has some deficiencies in her lab work which are going to be addressed by her PCP.   Additional Workup:  No additional work up required at this time  Can consider further work up for memory impairment if symptoms persist  Preventative:  We discussed headache hygiene and lifestyle factors that may improve headaches  We will hold off on a daily preventative at this time since she is not currently experiencing any headaches  Recommend melatonin at bedtime to assist with sleep 1-3mg at bedtime  Past/ failed/contraindicated: amitriptyline (side effects)  Future options: Propranolol,Topamax, CGRP med, botox  Acute:  Discussed not taking over-the-counter or prescription pain medications more than 3 days per week to prevent medication overuse/rebound headache  Continue Imitrex 100mg at the earliest onset of a migraine.  May repeat again in 2 hours if not completely headache free. No more  "than 2 tabs in 24 hours  Past/ failed/contraindicated: advil, ibuprofen, aspirin (ineffective)  Future options:  Other triptan (Maxalt), ubrelvy, reyvow, nurtec        Patient should follow up in 6 months, or I would be happy to see her sooner if needed.  Patient should call the office or send a MyChart message with any questions or concerns.  Patient should present to the nearest emergency department with any concerning symptoms.     History of Present Illness     We had the pleasure of evaluating Bethanie in neurological follow up today. she is a 18 y.o. year-old female who presents today for evaluation of headaches.     Consult with me 7/30/24: She has been experiencing approximately 3-4 migraine headaches per week which started this past year.  She was not a headache person previously and she denies any trauma, concussions, or any changes to medications.  She has tried over the counter medications including ibuprofen, advil, and aspirin but these have not been effective. Headaches do not seem to be positional in nature.  Of note, Mom reports problems with energy, appetite, feeling \"low\" as far as her mood. She was seen in the past for mood but mom's insurance changed and she was forced to find new providers in network.  Went to mental health clinic in [place] to try to expedite appointment with psychiatry and therapy.  Patient reports feeling stable at the moment but that her moods can fluctuate pretty rapidly.     Interval history as of 9/30/24:   Since her last office visit she is doing well from a headache perspective. She is not taking the amitriptyline secondary to side effects.  She has not needed to use the Imitrex because she has not experienced any severe headaches since her last office visit.  She has recently started Effexor but has noticed a decrease in her headache frequency even prior to starting that medication.  She feels stable from a mood perspective and notes improvement with Effexor.  She " reports some short-term memory issues. MRI brain came back without structural abnormalities.  She denies getting lost or having trouble recognizing familiar faces.  She isn't sleeping very well.  She currently has a urinary tract infection and also has some deficiencies in her lab work which are going to be addressed by her PCP.     Headaches started at what age? 18 years old  How often do the headaches occur? 3-4x per week last visit.  Now, 0  What time of the day do the headaches start?  No particular time of day   How long do the headaches last? Hours   Are you ever headache free? Yes    Aura? without aura     Last eye exam: scheduled on the 8/10/24 for eye appointment.     Where is your headache located and pain quality? Pressure pain at the center of her head at vertex.   What is the intensity of pain? Average: 6/10, worst 8-9/10    Associated symptoms:   [x] Nausea  (may be related)   [] Vomiting        [] Diarrhea  [] Insomnia    [x] Stiff or sore neck   [x] Problems with concentration  [x] Photophobia     [x]Phonophobia      [] Osmophobia  [] Blurred vision   [x] Prefer quiet, dark room  [x] Light-headed or dizzy     [x] Tinnitus (at random)  [] Hands or feet tingle or feel numb/paresthesias    [] Ptosis      [] Facial droop  [] Lacrimation  [] Nasal congestion/rhinorrhea   [] Flushing of face      Things that make the headache worse? Moving her head around quickly     Headache triggers:  none that she is aware of.     Have you seen someone else for headaches or pain? No  Have you had trigger point injection performed and how often? No  Have you had Botox injection performed and how often? No   Have you had epidural injections or transforaminal injections performed? No  Are you current pregnant or planning on getting pregnant? No. Not currently on any prevention but not sexually active.   Have you ever had any Brain imaging? Yes MRI 9/4/24    LIFESTYLE  Sleep   Averages: 8-10 hours per night.   Problems  "falling asleep?:   No  Problems staying asleep?:  No  Do you snore while asleep?No  Do you wake up with headaches? No  Physical activity: none at this time.  Water: \"not enough\" per patient.   Caffeine: 18oz soda per day  Mood: increased depression and anxiety recently.     Pertinent family history:  Family history of headaches:  no known family members with significant headaches  Any family history of aneurysms - No    Pertinent social history:  Work: none currently  Lives with lives with their family  Illicit Drugs: denies  Alcohol/tobacco: No alcohol. But 3 cigarettes per day and also vapes.     What medications do you take or have you taken for your headaches?:    ABORTIVE:    OTC medications: ibuprofen, advil, aspirin (not very effective)  Prescription: Imitrex 100mg  Past/failed/contraindicated: none    PREVENTIVE:   OTC medications: none  Prescription: amitriptyline 10mg (poss. Side effect w/ urination)  Medications from other providers: effexor  Past/failed/contraindicated:none    Review of Systems:  Constitutional:  Negative for appetite change, fatigue and fever.   HENT: Negative.  Negative for hearing loss, tinnitus, trouble swallowing and voice change.    Eyes: Negative.  Negative for photophobia, pain and visual disturbance.   Respiratory: Negative.  Negative for shortness of breath.    Cardiovascular: Negative.  Negative for palpitations.   Gastrointestinal: Negative.  Negative for nausea and vomiting.   Endocrine: Negative.  Negative for cold intolerance.   Genitourinary: Negative.  Negative for dysuria, frequency and urgency.   Musculoskeletal:  Negative for back pain, gait problem, myalgias, neck pain and neck stiffness.   Skin: Negative.  Negative for rash.   Allergic/Immunologic: Negative.    Neurological: Negative.  Negative for dizziness, tremors, seizures, syncope, facial asymmetry, speech difficulty, weakness, light-headedness, numbness and headaches.   Hematological: Negative.  Does not " bruise/bleed easily.   Psychiatric/Behavioral:  Negative for confusion, hallucinations and sleep disturbance.         Brain Fog/ memory concern     Reviewed ROS as entered by medical assistant.     Past Medical History   Past Medical History:   Diagnosis Date    Anemia     Anxiety     Depression     Frequent headaches 05/20/2021     History reviewed. No pertinent surgical history.  Family History   Problem Relation Age of Onset    No Known Problems Mother     No Known Problems Father     Cancer Maternal Grandmother         ovarian cancer    Cancer Maternal Grandfather         prostate cancer     Current Outpatient Medications on File Prior to Visit   Medication Sig Dispense Refill    pantoprazole (PROTONIX) 40 mg tablet Take 1 tablet (40 mg total) by mouth daily 30 tablet 11    SUMAtriptan (IMITREX) 100 mg tablet PLEASE SEE ATTACHED FOR DETAILED DIRECTIONS 9 tablet 0    venlafaxine (EFFEXOR-XR) 37.5 mg 24 hr capsule Take 1 capsule (37.5 mg total) by mouth daily 30 capsule 0    [DISCONTINUED] amitriptyline (ELAVIL) 10 mg tablet start 10mg at bedtime. Increase by 10mg each week until good effect on headaches/pain or reach 50mg daily (Patient not taking: Reported on 9/30/2024) 150 tablet 1    [DISCONTINUED] LORazepam (ATIVAN) 1 mg tablet Take 1 tablet (1 mg total) by mouth 60 minutes pre-procedure May repeat x1 30 mins prior if needed (Patient not taking: Reported on 9/17/2024) 2 tablet 0     No current facility-administered medications on file prior to visit.   No Known Allergies   Current Outpatient Medications on File Prior to Visit   Medication Sig Dispense Refill    pantoprazole (PROTONIX) 40 mg tablet Take 1 tablet (40 mg total) by mouth daily 30 tablet 11    SUMAtriptan (IMITREX) 100 mg tablet PLEASE SEE ATTACHED FOR DETAILED DIRECTIONS 9 tablet 0    venlafaxine (EFFEXOR-XR) 37.5 mg 24 hr capsule Take 1 capsule (37.5 mg total) by mouth daily 30 capsule 0    [DISCONTINUED] amitriptyline (ELAVIL) 10 mg tablet  "start 10mg at bedtime. Increase by 10mg each week until good effect on headaches/pain or reach 50mg daily (Patient not taking: Reported on 9/30/2024) 150 tablet 1    [DISCONTINUED] LORazepam (ATIVAN) 1 mg tablet Take 1 tablet (1 mg total) by mouth 60 minutes pre-procedure May repeat x1 30 mins prior if needed (Patient not taking: Reported on 9/17/2024) 2 tablet 0     No current facility-administered medications on file prior to visit.      Social History     Tobacco Use    Smoking status: Every Day     Current packs/day: 1.00     Types: Cigarettes    Smokeless tobacco: Never    Tobacco comments:     Dealing with a lot of anxiety. Refer to Behavioral Health   Vaping Use    Vaping status: Never Used   Substance and Sexual Activity    Alcohol use: Never    Drug use: Never    Sexual activity: Never       Objective     /80 (BP Location: Right arm, Patient Position: Sitting, Cuff Size: Standard)   Pulse 100   Ht 5' 1\" (1.549 m)   Wt 66.2 kg (146 lb)   BMI 27.59 kg/m²     Pertinent Labs:  Lab Results   Component Value Date    IRON 18 (L) 09/22/2024    TIBC 442 09/22/2024    FERRITIN 2 (L) 09/22/2024      Lab Results   Component Value Date    SODIUM 139 09/22/2024    K 3.8 09/22/2024     09/22/2024    CO2 24 09/22/2024    AGAP 11 09/22/2024    BUN 9 09/22/2024    CREATININE 0.56 (L) 09/22/2024    GLUC 91 10/03/2023    GLUF 96 09/22/2024    CALCIUM 9.2 09/22/2024    AST 11 (L) 09/22/2024    ALT 10 09/22/2024    ALKPHOS 58 09/22/2024    TP 7.4 09/22/2024    TBILI 0.73 09/22/2024    EGFR 134 09/22/2024     Lab Results   Component Value Date    WBC 6.02 09/22/2024    HGB 9.5 (L) 09/22/2024    HCT 32.1 (L) 09/22/2024    MCV 78 (L) 09/22/2024     09/22/2024     Pertinent Imaging/Test Results:  9/4/24 MRI Brain: No acute intracranial or extra-axial hemorrhage, mass effect, or collection. No evidence of acute infarction.     Physical Exam  On neurological examination the patient was awake, alert, attentive, " oriented to person, place, and time. Recent and remote memory intact to conversation with no evidence of language dysfunction. Satisfactory fund of knowledge. Normal attention span and concentration.  Mood, affect and judgement are appropriate. Speech is fluent without dysarthria or aphasia. Face appears symmetric, with no obvious weakness noted.  Audition is intact to casual conversation.  Eye movements are intact. Able to move bilateral upper extremities antigravity without difficulty.       Administrative Statements   I have spent a total time of 30 minutes in caring for this patient on the day of the visit/encounter including Diagnostic results, Prognosis, Risks and benefits of tx options, Instructions for management, Patient and family education, Importance of tx compliance, Risk factor reductions, Impressions, Counseling / Coordination of care, Documenting in the medical record, Reviewing / ordering tests, medicine, procedures  , and Obtaining or reviewing history  .

## 2024-09-30 ENCOUNTER — TELEPHONE (OUTPATIENT)
Age: 19
End: 2024-09-30

## 2024-09-30 ENCOUNTER — OFFICE VISIT (OUTPATIENT)
Dept: NEUROLOGY | Facility: CLINIC | Age: 19
End: 2024-09-30
Payer: COMMERCIAL

## 2024-09-30 VITALS
BODY MASS INDEX: 27.56 KG/M2 | DIASTOLIC BLOOD PRESSURE: 80 MMHG | SYSTOLIC BLOOD PRESSURE: 131 MMHG | WEIGHT: 146 LBS | HEART RATE: 100 BPM | HEIGHT: 61 IN

## 2024-09-30 DIAGNOSIS — G43.009 MIGRAINE WITHOUT AURA AND WITHOUT STATUS MIGRAINOSUS, NOT INTRACTABLE: Primary | ICD-10-CM

## 2024-09-30 DIAGNOSIS — B95.2 UTI (URINARY TRACT INFECTION) DUE TO ENTEROCOCCUS: Primary | ICD-10-CM

## 2024-09-30 DIAGNOSIS — N39.0 UTI (URINARY TRACT INFECTION) DUE TO ENTEROCOCCUS: Primary | ICD-10-CM

## 2024-09-30 PROCEDURE — 99213 OFFICE O/P EST LOW 20 MIN: CPT

## 2024-09-30 NOTE — PROGRESS NOTES
Review of Systems   Constitutional:  Negative for appetite change, fatigue and fever.   HENT: Negative.  Negative for hearing loss, tinnitus, trouble swallowing and voice change.    Eyes: Negative.  Negative for photophobia, pain and visual disturbance.   Respiratory: Negative.  Negative for shortness of breath.    Cardiovascular: Negative.  Negative for palpitations.   Gastrointestinal: Negative.  Negative for nausea and vomiting.   Endocrine: Negative.  Negative for cold intolerance.   Genitourinary: Negative.  Negative for dysuria, frequency and urgency.   Musculoskeletal:  Negative for back pain, gait problem, myalgias, neck pain and neck stiffness.   Skin: Negative.  Negative for rash.   Allergic/Immunologic: Negative.    Neurological: Negative.  Negative for dizziness, tremors, seizures, syncope, facial asymmetry, speech difficulty, weakness, light-headedness, numbness and headaches.   Hematological: Negative.  Does not bruise/bleed easily.   Psychiatric/Behavioral:  Negative for confusion, hallucinations and sleep disturbance.         Brain Fog/ memory concern

## 2024-09-30 NOTE — TELEPHONE ENCOUNTER
Patient requesting to speak further with provider regarding the following results:    Provider:Kelly Prince    Lab   [x] Microscopic urine and urine culture from 9/22  MRI  []  US  []  CT   []  X-Ray  []    Other   []        Patients mother called regarding the urine results.Mother stated the patient continues to have UTI symptoms,pain with urination.Patient declines flank pain ,blood in urine and burning with urination.Patient afebrile.Please advise.

## 2024-09-30 NOTE — TELEPHONE ENCOUNTER
Called mom to confirm that she is aware a script for Venlafaxine was sent to the pharmacy.  Isa confirmed that Bethanie got it and started it.  Instructed her to call the office if she has any concerns.

## 2024-09-30 NOTE — ASSESSMENT & PLAN NOTE
Since her last office visit she is doing well from a headache perspective. She is not taking the amitriptyline secondary to side effects.  She has not needed to use the Imitrex because she has not experienced any severe headaches since her last office visit.  She has recently started Effexor but has noticed a decrease in her headache frequency even prior to starting that medication.  She feels stable from a mood perspective and notes improvement with Effexor.  She reports some short-term memory issues. MRI brain came back without structural abnormalities.  She denies getting lost or having trouble recognizing familiar faces.  She isn't sleeping very well.  She currently has a urinary tract infection and also has some deficiencies in her lab work which are going to be addressed by her PCP.   Additional Workup:  No additional work up required at this time  Can consider further work up for memory impairment if symptoms persist  Preventative:  We discussed headache hygiene and lifestyle factors that may improve headaches  We will hold off on a daily preventative at this time since she is not currently experiencing any headaches  Recommend melatonin at bedtime to assist with sleep 1-3mg at bedtime  Past/ failed/contraindicated: amitriptyline (side effects)  Future options: Propranolol,Topamax, CGRP med, botox  Acute:  Discussed not taking over-the-counter or prescription pain medications more than 3 days per week to prevent medication overuse/rebound headache  Continue Imitrex 100mg at the earliest onset of a migraine.  May repeat again in 2 hours if not completely headache free. No more than 2 tabs in 24 hours  Past/ failed/contraindicated: advil, ibuprofen, aspirin (ineffective)  Future options:  Other triptan (Maxalt), ubrelvy, reyvow, nurtec

## 2024-10-01 RX ORDER — CEFDINIR 300 MG/1
300 CAPSULE ORAL EVERY 12 HOURS SCHEDULED
Qty: 10 CAPSULE | Refills: 0 | Status: SHIPPED | OUTPATIENT
Start: 2024-10-01 | End: 2024-10-06

## 2024-10-09 ENCOUNTER — TELEPHONE (OUTPATIENT)
Age: 19
End: 2024-10-09

## 2024-10-09 ENCOUNTER — OFFICE VISIT (OUTPATIENT)
Dept: FAMILY MEDICINE CLINIC | Facility: CLINIC | Age: 19
End: 2024-10-09
Payer: COMMERCIAL

## 2024-10-09 VITALS
WEIGHT: 145 LBS | SYSTOLIC BLOOD PRESSURE: 118 MMHG | HEIGHT: 61 IN | OXYGEN SATURATION: 97 % | BODY MASS INDEX: 27.38 KG/M2 | DIASTOLIC BLOOD PRESSURE: 66 MMHG | RESPIRATION RATE: 16 BRPM | TEMPERATURE: 98.1 F | HEART RATE: 108 BPM

## 2024-10-09 DIAGNOSIS — D50.8 OTHER IRON DEFICIENCY ANEMIA: Primary | ICD-10-CM

## 2024-10-09 DIAGNOSIS — R53.82 CHRONIC FATIGUE: ICD-10-CM

## 2024-10-09 DIAGNOSIS — N39.0 GROUP B STREPTOCOCCAL UTI: ICD-10-CM

## 2024-10-09 DIAGNOSIS — B95.1 GROUP B STREPTOCOCCAL UTI: ICD-10-CM

## 2024-10-09 DIAGNOSIS — E53.8 CYANOCOBALAMIN DEFICIENCY: ICD-10-CM

## 2024-10-09 DIAGNOSIS — E63.9 NUTRITIONAL DEFICIENCY: ICD-10-CM

## 2024-10-09 DIAGNOSIS — K21.9 GASTROESOPHAGEAL REFLUX DISEASE WITHOUT ESOPHAGITIS: ICD-10-CM

## 2024-10-09 PROCEDURE — 99214 OFFICE O/P EST MOD 30 MIN: CPT | Performed by: NURSE PRACTITIONER

## 2024-10-09 RX ORDER — CYANOCOBALAMIN 1000 UG/ML
1000 INJECTION, SOLUTION INTRAMUSCULAR; SUBCUTANEOUS ONCE
OUTPATIENT
Start: 2024-10-14 | End: 2024-10-14

## 2024-10-09 RX ORDER — SODIUM CHLORIDE 9 MG/ML
20 INJECTION, SOLUTION INTRAVENOUS ONCE
OUTPATIENT
Start: 2024-10-14

## 2024-10-09 NOTE — ASSESSMENT & PLAN NOTE
Patient her mother and I discussed treatment options  Since she cannot tolerate oral iron supplementation, she would like to proceed with iron infusions  I will arrange the same  I will also order B12 injection at the same time  Recheck CBC after 6 treatments

## 2024-10-09 NOTE — TELEPHONE ENCOUNTER
Patient had an initial intake with Isaias Tolliver and he told her he was not the one she was assigned to for F/U. She said that someone was to reach out with further information and she did not get a call as yet. Patient would appreciate a call.

## 2024-10-09 NOTE — PROGRESS NOTES
Ambulatory Visit  Name: Bethanie Cronin      : 2005      MRN: [id number]  Encounter Provider: MANNY Johnson  Encounter Date: 10/9/2024   Encounter department: [facility]    Assessment & Plan  Other iron deficiency anemia  Patient her mother and I discussed treatment options  Since she cannot tolerate oral iron supplementation, she would like to proceed with iron infusions  I will arrange the same  I will also order B12 injection at the same time  Recheck CBC after 6 treatments       Cyanocobalamin deficiency         Group B streptococcal UTI  Resolved  Call with recurrence     Chronic fatigue  Multifactor etiology-anemia, nutritional deficiencies  We will work on correcting the same     Return to office in 3 months for recheck and physical  Gastroesophageal reflux disease without esophagitis         Nutritional deficiency            History of Present Illness     18-year-old female presents with her mother for follow-up  Initially appointment made for UTI follow-up  Urine testing positive for beta strep  I treated her few weeks ago with cephalosporin  Today she reports symptoms completely resolved    We reviewed her recent blood work which specifically were concerning for ongoing iron deficiency anemia and low B12 as well as vitamin D deficiency  She complains of ongoing inordinate fatigue  She is not able to tolerate iron supplementation due to her ongoing GERD symptoms  We did discuss supplementation of vitamin D with coadministration of calcium      Difficulty Urinating   This is a recurrent problem. The current episode started in the past 7 days. The problem occurs every urination. The problem has been resolved. The quality of the pain is described as burning. The pain is at a severity of 0/10. The patient is experiencing no pain. There has been no fever. The fever has been present for Less than 1 day. She is Not sexually active. There is No history of pyelonephritis.  "Associated symptoms include nausea.       History obtained from : patient and patient's mother  Review of Systems   Constitutional:  Positive for fatigue.   HENT:  Negative for trouble swallowing.    Respiratory:  Negative for cough and shortness of breath.    Cardiovascular: Negative.    Gastrointestinal:  Positive for abdominal pain and nausea. Negative for blood in stool.   Genitourinary:  Positive for dysuria.   Neurological:  Positive for dizziness. Negative for weakness.   Hematological:  Does not bruise/bleed easily.     Medical History Reviewed by provider this encounter:  Tobacco  Allergies  Meds  Problems  Med Hx  Surg Hx  Fam Hx       Current Outpatient Medications on File Prior to Visit   Medication Sig Dispense Refill    pantoprazole (PROTONIX) 40 mg tablet Take 1 tablet (40 mg total) by mouth daily 30 tablet 11    SUMAtriptan (IMITREX) 100 mg tablet PLEASE SEE ATTACHED FOR DETAILED DIRECTIONS 9 tablet 0    venlafaxine (EFFEXOR-XR) 37.5 mg 24 hr capsule Take 1 capsule (37.5 mg total) by mouth daily 30 capsule 0     No current facility-administered medications on file prior to visit.          Objective     /66 (BP Location: Left arm, Patient Position: Sitting, Cuff Size: Standard)   Pulse (!) 108   Temp 98.1 °F (36.7 °C) (Temporal)   Resp 16   Ht 5' 1\" (1.549 m)   Wt 65.8 kg (145 lb)   SpO2 97%   BMI 27.40 kg/m²     Physical Exam  Vitals and nursing note reviewed.   Constitutional:       General: She is not in acute distress.     Appearance: Normal appearance.   Cardiovascular:      Rate and Rhythm: Regular rhythm. Tachycardia present.      Pulses: Normal pulses.   Pulmonary:      Effort: Pulmonary effort is normal.      Breath sounds: Normal breath sounds.   Abdominal:      General: Bowel sounds are normal.      Palpations: Abdomen is soft.      Tenderness: There is no abdominal tenderness.   Skin:     General: Skin is warm and dry.      Coloration: Skin is pale.   Neurological:      " General: No focal deficit present.      Mental Status: She is alert. Mental status is at baseline.       Administrative Statements   I have spent a total time of 30 minutes in caring for this patient on the day of the visit/encounter including Diagnostic results, Prognosis, Risks and benefits of tx options, Instructions for management, Patient and family education, Importance of tx compliance, Risk factor reductions, Impressions, Counseling / Coordination of care, Documenting in the medical record, Reviewing / ordering tests, medicine, procedures  , and Obtaining or reviewing history  .

## 2024-10-17 ENCOUNTER — HOSPITAL ENCOUNTER (EMERGENCY)
Facility: HOSPITAL | Age: 19
Discharge: HOME | End: 2024-10-17
Attending: STUDENT IN AN ORGANIZED HEALTH CARE EDUCATION/TRAINING PROGRAM
Payer: COMMERCIAL

## 2024-10-17 VITALS
OXYGEN SATURATION: 97 % | SYSTOLIC BLOOD PRESSURE: 133 MMHG | DIASTOLIC BLOOD PRESSURE: 80 MMHG | HEART RATE: 95 BPM | TEMPERATURE: 98.7 F | RESPIRATION RATE: 16 BRPM

## 2024-10-17 DIAGNOSIS — F33.1 MAJOR DEPRESSIVE DISORDER, RECURRENT, MODERATE (HCC): ICD-10-CM

## 2024-10-17 DIAGNOSIS — D64.9 ANEMIA: ICD-10-CM

## 2024-10-17 DIAGNOSIS — N39.0 UTI (URINARY TRACT INFECTION): Primary | ICD-10-CM

## 2024-10-17 LAB
ALBUMIN SERPL BCG-MCNC: 4.8 G/DL (ref 3.5–5)
ALP SERPL-CCNC: 56 U/L (ref 34–104)
ALT SERPL W P-5'-P-CCNC: 9 U/L (ref 7–52)
ANION GAP SERPL CALCULATED.3IONS-SCNC: 9 MMOL/L (ref 4–13)
APTT PPP: 31 SECONDS (ref 23–34)
AST SERPL W P-5'-P-CCNC: 21 U/L (ref 13–39)
BACTERIA UR QL AUTO: ABNORMAL /HPF
BASOPHILS # BLD AUTO: 0.06 THOUSANDS/ΜL (ref 0–0.1)
BASOPHILS NFR BLD AUTO: 1 % (ref 0–1)
BILIRUB SERPL-MCNC: 0.85 MG/DL (ref 0.2–1)
BILIRUB UR QL STRIP: NEGATIVE
BUDDING YEAST: PRESENT
BUN SERPL-MCNC: 14 MG/DL (ref 5–25)
CALCIUM SERPL-MCNC: 9.4 MG/DL (ref 8.4–10.2)
CHLORIDE SERPL-SCNC: 105 MMOL/L (ref 96–108)
CLARITY UR: ABNORMAL
CO2 SERPL-SCNC: 23 MMOL/L (ref 21–32)
COLOR UR: YELLOW
CREAT SERPL-MCNC: 0.62 MG/DL (ref 0.6–1.3)
EOSINOPHIL # BLD AUTO: 0.05 THOUSAND/ΜL (ref 0–0.61)
EOSINOPHIL NFR BLD AUTO: 1 % (ref 0–6)
ERYTHROCYTE [DISTWIDTH] IN BLOOD BY AUTOMATED COUNT: 14.7 % (ref 11.6–15.1)
GFR SERPL CREATININE-BSD FRML MDRD: 130 ML/MIN/1.73SQ M
GLUCOSE SERPL-MCNC: 86 MG/DL (ref 65–140)
GLUCOSE UR STRIP-MCNC: NEGATIVE MG/DL
HCT VFR BLD AUTO: 33.4 % (ref 34.8–46.1)
HGB BLD-MCNC: 9.8 G/DL (ref 11.5–15.4)
HGB UR QL STRIP.AUTO: NEGATIVE
IMM GRANULOCYTES # BLD AUTO: 0.04 THOUSAND/UL (ref 0–0.2)
IMM GRANULOCYTES NFR BLD AUTO: 0 % (ref 0–2)
INR PPP: 1.09 (ref 0.85–1.19)
KETONES UR STRIP-MCNC: ABNORMAL MG/DL
LEUKOCYTE ESTERASE UR QL STRIP: ABNORMAL
LIPASE SERPL-CCNC: 7 U/L (ref 11–82)
LYMPHOCYTES # BLD AUTO: 2.28 THOUSANDS/ΜL (ref 0.6–4.47)
LYMPHOCYTES NFR BLD AUTO: 23 % (ref 14–44)
MCH RBC QN AUTO: 22.5 PG (ref 26.8–34.3)
MCHC RBC AUTO-ENTMCNC: 29.3 G/DL (ref 31.4–37.4)
MCV RBC AUTO: 77 FL (ref 82–98)
MONOCYTES # BLD AUTO: 0.81 THOUSAND/ΜL (ref 0.17–1.22)
MONOCYTES NFR BLD AUTO: 8 % (ref 4–12)
MUCOUS THREADS UR QL AUTO: ABNORMAL
NEUTROPHILS # BLD AUTO: 6.88 THOUSANDS/ΜL (ref 1.85–7.62)
NEUTS SEG NFR BLD AUTO: 67 % (ref 43–75)
NITRITE UR QL STRIP: POSITIVE
NON-SQ EPI CELLS URNS QL MICRO: ABNORMAL /HPF
NRBC BLD AUTO-RTO: 0 /100 WBCS
PH UR STRIP.AUTO: 7 [PH]
PLATELET # BLD AUTO: 355 THOUSANDS/UL (ref 149–390)
PMV BLD AUTO: 10.5 FL (ref 8.9–12.7)
POTASSIUM SERPL-SCNC: 4.6 MMOL/L (ref 3.5–5.3)
PROT SERPL-MCNC: 7.8 G/DL (ref 6.4–8.4)
PROT UR STRIP-MCNC: ABNORMAL MG/DL
PROTHROMBIN TIME: 14.8 SECONDS (ref 12.3–15)
RBC # BLD AUTO: 4.35 MILLION/UL (ref 3.81–5.12)
RBC #/AREA URNS AUTO: ABNORMAL /HPF
SODIUM SERPL-SCNC: 137 MMOL/L (ref 135–147)
SP GR UR STRIP.AUTO: 1.02 (ref 1–1.03)
UROBILINOGEN UR STRIP-ACNC: 2 MG/DL
WBC # BLD AUTO: 10.12 THOUSAND/UL (ref 4.31–10.16)
WBC #/AREA URNS AUTO: ABNORMAL /HPF
WBC CLUMPS # UR AUTO: PRESENT /UL

## 2024-10-17 PROCEDURE — 85730 THROMBOPLASTIN TIME PARTIAL: CPT

## 2024-10-17 PROCEDURE — 85610 PROTHROMBIN TIME: CPT

## 2024-10-17 PROCEDURE — 80053 COMPREHEN METABOLIC PANEL: CPT

## 2024-10-17 PROCEDURE — 87186 SC STD MICRODIL/AGAR DIL: CPT

## 2024-10-17 PROCEDURE — 85025 COMPLETE CBC W/AUTO DIFF WBC: CPT

## 2024-10-17 PROCEDURE — 83690 ASSAY OF LIPASE: CPT

## 2024-10-17 PROCEDURE — 99285 EMERGENCY DEPT VISIT HI MDM: CPT

## 2024-10-17 PROCEDURE — 87086 URINE CULTURE/COLONY COUNT: CPT

## 2024-10-17 PROCEDURE — 96374 THER/PROPH/DIAG INJ IV PUSH: CPT

## 2024-10-17 PROCEDURE — 36415 COLL VENOUS BLD VENIPUNCTURE: CPT

## 2024-10-17 PROCEDURE — 87077 CULTURE AEROBIC IDENTIFY: CPT

## 2024-10-17 PROCEDURE — 81001 URINALYSIS AUTO W/SCOPE: CPT

## 2024-10-17 RX ORDER — MAGNESIUM HYDROXIDE/ALUMINUM HYDROXICE/SIMETHICONE 120; 1200; 1200 MG/30ML; MG/30ML; MG/30ML
30 SUSPENSION ORAL ONCE
Status: COMPLETED | OUTPATIENT
Start: 2024-10-17 | End: 2024-10-17

## 2024-10-17 RX ORDER — FAMOTIDINE 10 MG/ML
20 INJECTION, SOLUTION INTRAVENOUS ONCE
Status: COMPLETED | OUTPATIENT
Start: 2024-10-17 | End: 2024-10-17

## 2024-10-17 RX ORDER — SULFAMETHOXAZOLE AND TRIMETHOPRIM 800; 160 MG/1; MG/1
1 TABLET ORAL ONCE
Status: COMPLETED | OUTPATIENT
Start: 2024-10-17 | End: 2024-10-17

## 2024-10-17 RX ORDER — SULFAMETHOXAZOLE AND TRIMETHOPRIM 800; 160 MG/1; MG/1
1 TABLET ORAL 2 TIMES DAILY
Qty: 9 TABLET | Refills: 0 | Status: SHIPPED | OUTPATIENT
Start: 2024-10-17 | End: 2024-10-22

## 2024-10-17 RX ORDER — CEPHALEXIN 500 MG/1
500 CAPSULE ORAL EVERY 12 HOURS SCHEDULED
Qty: 13 CAPSULE | Refills: 0 | Status: SHIPPED | OUTPATIENT
Start: 2024-10-17 | End: 2024-10-17

## 2024-10-17 RX ORDER — SUCRALFATE 1 G/1
1 TABLET ORAL ONCE
Status: COMPLETED | OUTPATIENT
Start: 2024-10-17 | End: 2024-10-17

## 2024-10-17 RX ADMIN — FAMOTIDINE 20 MG: 10 INJECTION INTRAVENOUS at 17:11

## 2024-10-17 RX ADMIN — ALUMINUM HYDROXIDE, MAGNESIUM HYDROXIDE, AND DIMETHICONE 30 ML: 200; 20; 200 SUSPENSION ORAL at 17:11

## 2024-10-17 RX ADMIN — SUCRALFATE 1 G: 1 TABLET ORAL at 17:11

## 2024-10-17 RX ADMIN — SULFAMETHOXAZOLE AND TRIMETHOPRIM 1 TABLET: 800; 160 TABLET ORAL at 19:53

## 2024-10-17 NOTE — ED PROVIDER NOTES
Time reflects when diagnosis was documented in both MDM as applicable and the Disposition within this note       Time User Action Codes Description Comment    10/17/2024  7:38 PM Vail, Chan Add [D64.9] Anemia     10/17/2024  7:38 PM Vail, Chan Add [F33.1] Major depressive disorder, recurrent, moderate (HCC)     10/17/2024  7:38 PM Vail, Chan Add [N39.0] UTI (urinary tract infection)     10/17/2024  7:38 PM Vail, Chan Modify [D64.9] Anemia     10/17/2024  7:38 PM Vail, Chan Modify [N39.0] UTI (urinary tract infection)           ED Disposition       ED Disposition   Discharge    Condition   Stable    Date/Time   Thu Oct 17, 2024  7:40 PM    Comment   Bethanie Cronin discharge to home/self care.                   Assessment & Plan       Medical Decision Making  Female patient who presented to the emergency department with abdominal pain.  On physical examination, patient was noted to have pallor on visualization as well as minimal abdominal tenderness diffusely to palpation.  Patient's labs show anemia within patient's baseline as well as urinary tract infection.  While in the emergency department, patient was given Carafate, Maalox, Pepcid, and her first dose of Bactrim antibiotics.  Patient was noted to have symptomatic improvement and was plan for discharge.  Patient was advised to follow-up outpatient with her PCP and was also provided a prescription for Bactrim.  She was also given outpatient resources for her worsening depression.  Patient was instructed to return to the emergency department if her symptoms worsen.  Patient was agreeable to plan.    Amount and/or Complexity of Data Reviewed  Labs: ordered. Decision-making details documented in ED Course.    Risk  OTC drugs.  Prescription drug management.        ED Course as of 10/20/24 1825   Thu Oct 17, 2024   1744 Hemoglobin(!): 9.8  Within patient's baseline.   1809 Spoke to patient in the room by herself and she states that she has been feeling little  bit more depressed lately however, no plan to hurt herself.   1934 Leukocytes, UA(!): Small   1934 Nitrite, UA(!): Positive   1940 MUCUS THREADS(!): Innumerable   1941 WBC, UA(!): 30-50       Medications   Famotidine (PF) (PEPCID) injection 20 mg (20 mg Intravenous Given 10/17/24 1711)   sucralfate (CARAFATE) tablet 1 g (1 g Oral Given 10/17/24 1711)   aluminum-magnesium hydroxide-simethicone (MAALOX) oral suspension 30 mL (30 mL Oral Given 10/17/24 1711)   sulfamethoxazole-trimethoprim (BACTRIM DS) 800-160 mg per tablet 1 tablet (1 tablet Oral Given 10/17/24 1953)       ED Risk Strat Scores                                               History of Present Illness       Chief Complaint   Patient presents with    Weakness - Generalized     Patient reports weakness overall.  Has history of anemia- waiting for iron infusions. Patient appears pale, mother reports she is a little more pale than normal. Battling some stomach issues- abdominal pain/bloating.         Past Medical History:   Diagnosis Date    Anemia     Anxiety     Depression     Frequent headaches 05/20/2021      History reviewed. No pertinent surgical history.   Family History   Problem Relation Age of Onset    No Known Problems Mother     No Known Problems Father     Cancer Maternal Grandmother         ovarian cancer    Cancer Maternal Grandfather         prostate cancer      Social History     Tobacco Use    Smoking status: Every Day     Current packs/day: 1.00     Types: Cigarettes    Smokeless tobacco: Never    Tobacco comments:     Dealing with a lot of anxiety. Refer to Behavioral Health   Vaping Use    Vaping status: Never Used   Substance Use Topics    Alcohol use: Never    Drug use: Never      E-Cigarette/Vaping    E-Cigarette Use Never User       E-Cigarette/Vaping Substances    Nicotine Yes every day    THC No     CBD No     Flavoring No     Other No     Unknown No       I have reviewed and agree with the history as documented.     18-year-old  female with significant PMH including anemia, depression, and anxiety who presents to the emergency department for abdominal pain.  Patient states for the past week she has been having difficulties with bloating and abdominal pain which has been worsening.  Patient is also complaining of increased fatigue and her mother stating she appears more pale than usual.  Patient has tried Pepcid and Protonix at home with minimal to no improvement in her symptoms however, no medications prior to arrival for pain.  Patient has a history of anemia and is scheduled for iron infusions.  Patient states that she had her menses at the beginning of this month which was normal and on time.  Denies any history of abdominal surgeries.  She also mentions she has been struggling with her depression lately and is interested in outpatient resources.  Denies SI/HI at this time.  No other acute concerns at this time. Denies chest pain, SOB, cough, n/v/d, fever, chills, dizziness, lightheadedness, HA, dysuria, hematuria, hematochezia, or melena.                 Review of Systems   Constitutional:  Positive for fatigue. Negative for appetite change, chills, diaphoresis and fever.   HENT:  Negative for congestion, ear pain, postnasal drip, rhinorrhea, sore throat and trouble swallowing.    Eyes:  Negative for pain and visual disturbance.   Respiratory:  Negative for cough and shortness of breath.    Cardiovascular:  Negative for chest pain and palpitations.   Gastrointestinal:  Positive for abdominal distention and abdominal pain. Negative for constipation, diarrhea, nausea and vomiting.   Genitourinary:  Negative for decreased urine volume, dysuria and hematuria.   Musculoskeletal:  Negative for arthralgias and back pain.   Skin:  Positive for pallor. Negative for color change and rash.   Neurological:  Negative for dizziness, seizures, syncope, weakness, light-headedness and headaches.   All other systems reviewed and are  negative.          Objective       ED Triage Vitals [10/17/24 1559]   Temperature Pulse Blood Pressure Respirations SpO2 Patient Position - Orthostatic VS   98.7 °F (37.1 °C) 95 133/80 16 97 % Sitting      Temp Source Heart Rate Source BP Location FiO2 (%) Pain Score    Oral Monitor Right arm -- --      Vitals      Date and Time Temp Pulse SpO2 Resp BP Pain Score FACES Pain Rating User   10/17/24 1559 98.7 °F (37.1 °C) 95 97 % 16 133/80 -- -- TS            Physical Exam  Vitals and nursing note reviewed.   Constitutional:       General: She is not in acute distress.     Appearance: Normal appearance. She is normal weight.      Comments: Tearful at bedside   HENT:      Head: Normocephalic and atraumatic.      Right Ear: External ear normal.      Left Ear: External ear normal.      Nose: Nose normal.      Mouth/Throat:      Pharynx: Oropharynx is clear.   Eyes:      Conjunctiva/sclera: Conjunctivae normal.   Cardiovascular:      Rate and Rhythm: Normal rate and regular rhythm.      Pulses: Normal pulses.      Heart sounds: Normal heart sounds.      Comments: RRR with +S1 and S2, no murmurs appreciated on exam. Peripheral pulses intact.    Pulmonary:      Effort: Pulmonary effort is normal. No respiratory distress.      Breath sounds: Normal breath sounds. No wheezing, rhonchi or rales.      Comments: CTABL with no abnormal lung sounds such as wheezes or rales appreciated on exam.     Abdominal:      General: Abdomen is flat. Bowel sounds are normal. There is no distension.      Palpations: Abdomen is soft.      Tenderness: There is abdominal tenderness.      Comments: Minimal tenderness to palpation diffusely.  Soft, normo-active bowel sounds. Without rigidity, guarding, or distension.     Musculoskeletal:         General: Normal range of motion.      Cervical back: Normal range of motion.      Right lower leg: No edema.      Left lower leg: No edema.   Skin:     General: Skin is warm and dry.      Coloration: Skin is  pale.      Comments: Significant pallor noted on visualization at bedside   Neurological:      General: No focal deficit present.      Mental Status: She is alert and oriented to person, place, and time. Mental status is at baseline.      Comments: CN grossly intact on visualization. No focal neurologic deficits noted on exam.  5/5 strength in all extremities. Neurovascularly intact with normal sensation and motor function.       Psychiatric:         Behavior: Behavior normal.         Thought Content: Thought content normal.         Judgment: Judgment normal.      Comments: Patient noted to be tearful at bedside and states worsening depression.  Patient denies SI/HI.  Also denies active plan or intent.  Interested in outpatient resources.         Results Reviewed       Procedure Component Value Units Date/Time    Urine culture [[id number]]  (Abnormal)  (Susceptibility) Collected: 10/17/24 1830    Lab Status: Final result Specimen: Urine, Clean Catch Updated: 10/20/24 1438     Urine Culture >100,000 cfu/ml Staphylococcus capitis    Susceptibility       Staphylococcus capitis (1)       Antibiotic Interpretation Microscan   Method Status    ZID Performed  Yes  SANDIP Final    Cefazolin ($) Resistant <=8.00 ug/ml SANDIP Final    Gentamicin ($$) Susceptible <=4 ug/ml SANDIP Final    Nitrofurantoin Susceptible <=32 ug/ml SANDIP Final    Oxacillin Resistant >2.00 ug/ml SANDIP Final    Penicillin Resistant >2.000 ug/ml SANDIP Final    Tetracycline Susceptible <=4 ug/ml SANDIP Final    Trimethoprim + Sulfamethoxazole ($$$) Susceptible <=0.5/9.5 ug/ml SANDIP Final    Vancomycin ($) Susceptible 1.00 ug/ml SANDIP Final                       Urine Microscopic [[id number]]  (Abnormal) Collected: 10/17/24 1830    Lab Status: Final result Specimen: Urine, Clean Catch Updated: 10/17/24 1940     RBC, UA 4-10 /hpf      WBC, UA 30-50 /hpf      Epithelial Cells Occasional /hpf      Bacteria, UA Innumerable /hpf      MUCUS THREADS Innumerable     WBC Clumps Present      Budding Yeast Present    UA w Reflex to Microscopic w Reflex to Culture [[id number]]  (Abnormal) Collected: 10/17/24 1830    Lab Status: Final result Specimen: Urine, Clean Catch Updated: 10/17/24 1930     Color, UA Yellow     Clarity, UA Turbid     Specific Gravity, UA 1.025     pH, UA 7.0     Leukocytes, UA Small     Nitrite, UA Positive     Protein, UA Trace mg/dl      Glucose, UA Negative mg/dl      Ketones, UA Trace mg/dl      Urobilinogen, UA 2.0 mg/dl      Bilirubin, UA Negative     Occult Blood, UA Negative    Comprehensive metabolic panel [[id number]] Collected: 10/17/24 1710    Lab Status: Final result Specimen: Blood from Arm, Left Updated: 10/17/24 1746     Sodium 137 mmol/L      Potassium 4.6 mmol/L      Chloride 105 mmol/L      CO2 23 mmol/L      ANION GAP 9 mmol/L      BUN 14 mg/dL      Creatinine 0.62 mg/dL      Glucose 86 mg/dL      Calcium 9.4 mg/dL      AST 21 U/L      ALT 9 U/L      Alkaline Phosphatase 56 U/L      Total Protein 7.8 g/dL      Albumin 4.8 g/dL      Total Bilirubin 0.85 mg/dL      eGFR 130 ml/min/1.73sq m     Narrative:      [employer] guidelines for Chronic Kidney Disease (CKD):     Stage 1 with normal or high GFR (GFR > 90 mL/min/1.73 square meters)    Stage 2 Mild CKD (GFR = 60-89 mL/min/1.73 square meters)    Stage 3A Moderate CKD (GFR = 45-59 mL/min/1.73 square meters)    Stage 3B Moderate CKD (GFR = 30-44 mL/min/1.73 square meters)    Stage 4 Severe CKD (GFR = 15-29 mL/min/1.73 square meters)    Stage 5 End Stage CKD (GFR <15 mL/min/1.73 square meters)  Note: GFR calculation is accurate only with a steady state creatinine    Lipase [[id number]]  (Abnormal) Collected: 10/17/24 1710    Lab Status: Final result Specimen: Blood from Arm, Left Updated: 10/17/24 1746     Lipase 7 u/L     Protime-INR [[id number]]  (Normal) Collected: 10/17/24 1710    Lab Status: Final result Specimen: Blood from Arm, Left Updated: 10/17/24 1735     Protime 14.8 seconds       INR 1.09    Narrative:      INR Therapeutic Range    Indication                                             INR Range      Atrial Fibrillation                                               2.0-3.0  Hypercoagulable State                                    2.0.2.3  Left Ventricular Asist Device                            2.0-3.0  Mechanical Heart Valve                                  -    Aortic(with afib, MI, embolism, HF, LA enlargement,    and/or coagulopathy)                                     2.0-3.0 (2.5-3.5)     Mitral                                                             2.5-3.5  Prosthetic/Bioprosthetic Heart Valve               2.0-3.0  Venous thromboembolism (VTE: VT, PE        2.0-3.0    APTT [[id number]]  (Normal) Collected: 10/17/24 1710    Lab Status: Final result Specimen: Blood from Arm, Left Updated: 10/17/24 1735     PTT 31 seconds     CBC and differential [[id number]]  (Abnormal) Collected: 10/17/24 1710    Lab Status: Final result Specimen: Blood from Arm, Left Updated: 10/17/24 1723     WBC 10.12 Thousand/uL      RBC 4.35 Million/uL      Hemoglobin 9.8 g/dL      Hematocrit 33.4 %      MCV 77 fL      MCH 22.5 pg      MCHC 29.3 g/dL      RDW 14.7 %      MPV 10.5 fL      Platelets 355 Thousands/uL      nRBC 0 /100 WBCs      Segmented % 67 %      Immature Grans % 0 %      Lymphocytes % 23 %      Monocytes % 8 %      Eosinophils Relative 1 %      Basophils Relative 1 %      Absolute Neutrophils 6.88 Thousands/µL      Absolute Immature Grans 0.04 Thousand/uL      Absolute Lymphocytes 2.28 Thousands/µL      Absolute Monocytes 0.81 Thousand/µL      Eosinophils Absolute 0.05 Thousand/µL      Basophils Absolute 0.06 Thousands/µL             No orders to display       Procedures    ED Medication and Procedure Management   Prior to Admission Medications   Prescriptions Last Dose Informant Patient Reported? Taking?   SUMAtriptan (IMITREX) 100 mg tablet  Self No No   Sig: PLEASE SEE ATTACHED FOR DETAILED  DIRECTIONS   pantoprazole (PROTONIX) 40 mg tablet  Self No No   Sig: Take 1 tablet (40 mg total) by mouth daily   venlafaxine (EFFEXOR-XR) 37.5 mg 24 hr capsule  Self No No   Sig: Take 1 capsule (37.5 mg total) by mouth daily      Facility-Administered Medications: None     Discharge Medication List as of 10/17/2024  7:45 PM        START taking these medications    Details   sulfamethoxazole-trimethoprim (BACTRIM DS) 800-160 mg per tablet Take 1 tablet by mouth 2 (two) times a day for 5 days smx-tmp DS (BACTRIM) 800-160 mg tabs (1tab q12 D10), Starting Thu 10/17/2024, Until Tue 10/22/2024, Normal           CONTINUE these medications which have NOT CHANGED    Details   pantoprazole (PROTONIX) 40 mg tablet Take 1 tablet (40 mg total) by mouth daily, Starting Mon 2/19/2024, Normal      SUMAtriptan (IMITREX) 100 mg tablet PLEASE SEE ATTACHED FOR DETAILED DIRECTIONS, Normal      venlafaxine (EFFEXOR-XR) 37.5 mg 24 hr capsule Take 1 capsule (37.5 mg total) by mouth daily, Starting Thu 9/26/2024, Normal           STOP taking these medications       cephalexin (KEFLEX) 500 mg capsule Comments:   Reason for Stopping:             No discharge procedures on file.  ED SEPSIS DOCUMENTATION   Time reflects when diagnosis was documented in both MDM as applicable and the Disposition within this note       Time User Action Codes Description Comment    10/17/2024  7:38 PM Chan Vazquez Add [D64.9] Anemia     10/17/2024  7:38 PM Chan Vazquez Add [F33.1] Major depressive disorder, recurrent, moderate (HCC)     10/17/2024  7:38 PM Chan Vazquez Add [N39.0] UTI (urinary tract infection)     10/17/2024  7:38 PM Chan Vazquez Modify [D64.9] Anemia     10/17/2024  7:38 PM Chan Vazquez Modify [N39.0] UTI (urinary tract infection)                  Chan Vazquez MD  10/20/24 1825

## 2024-10-17 NOTE — DISCHARGE INSTRUCTIONS
Therapy and Counseling Resources    [facility] [employer] [facility]  Address: [address] [place] [address]    Phone: [phone]    Website: [employer] [facility]    Services: Holistic counseling for better mental health.    [facility] [facility]  Address: [address], [place] [address]    Phone: [phone]    Website: [facility] [facility]    Services: Supportive and effective outpatient psychological care.    [facility]  Address: [address], [place] [address]    Phone: [phone]    Website: [facility] [place]    Services: Outpatient addiction/mental health treatment services for adults and teens.    [employer] [facility]  Address: [address] [place] [place] [address]    Phone: [phone]    Website: [facility]    Services: Counseling for gambling, substance abuse, relationship issues, anxiety, addictions, career issues, and more.    [facility]  Website: [facility]    Services: Mental health and behavioral health programs, Employee Assistance Program (EAP), virtual visits, and emotional support by phone or mobile yoni.    [facility]  Address: [address] [place] [address], [place] [address]    Phone: [phone]    Website: [facility]    Services: Offers a variety of counseling services to help individuals develop the skills necessary to create the life they want.    [facility] [place]  Address: [address] [address], [place] [address]    Phone: [phone]    Website: [facility] [place]    Services: Provides individual therapy, couples and marriage counseling, psychiatry by video, and more.    [facility]  Address: [address], [place]  "[address]    Phone: [phone]    Website: [facility] [place]    Services: Offers short-term care units for adults and adolescents in severe acute crisis, with treatment programs designed to stabilize and return them to a less-intensive level of care.      ____________________________________________________    Emotion-Focused Coping   Whether you’re feeling lonely, nervous, sad, or angry, emotion-focused coping skills can help you deal with your feelings in a healthy way. Healthy coping strategies may soothe you, temporarily distract you, or help you tolerate your distress.  Sometimes it’s helpful to face your emotions head-on. For example, feeling sad after the death of a loved one can help you honor your loss.  So while it would be important to use coping skills to help relieve some of your distress, coping strategies shouldn’t be about constantly distracting you from reality.  Other times, coping skills may help you change your mood. If you’ve had a bad day at work, playing with your kids or watching a funny movie might cheer you up. Or, if you’re angry about something someone said, a healthy coping strategy might help you calm down before you say something you might regret.  Other examples of healthy ways to cope with emotions include:  Care for yourself: Put on lotion that smells good, spend time in nature, take a bath, drink tea, or take care of your body in a way that makes you feel good such as painting your nails, doing your hair, putting on a face mask.  Engage in a hobby: Do something you enjoy such as coloring, drawing, or listening to music.  Exercise: Do yoga, go for a walk, take a hike, or engage in a recreational sport.  Focus on a task: Clean the house (or a closet, drawer, or area), cook a meal, garden, or read a book.  Practice mindfulness: List the things you feel grateful for, meditate, picture your \"happy place,\" or look at pictures to remind you of the people, places, and things " that bring concepción.  Use relaxation strategies: Play with a pet, practice breathing exercises, squeeze a stress ball, use a relaxation rahel, enjoy some aromatherapy, try progressive muscle relaxation, or write in a journal.    Problem-Focused Coping    In other situations, problem-focused coping may involve more drastic measures, like changing jobs or ending a relationship. Here are some examples of positive problem-focused coping skills:  Ask for support from a friend or a professional.  Create a to-do list.  Engage in problem-solving.  Establish healthy boundaries.  Walk away and leave a situation that is causing you stress.  Work on managing your time better    [facility] Updated on March 23, 2023   Medically reviewed by Yong Nuñez MD          Here are some highly recommended apps for adults to help cope with depression and anxiety:    Best for Community Support  TalkLife: A mental health support network where users can share their experiences and support each other.    Best Mood Tracker  Daylio Journal: A mood tracker that helps you monitor your daily mood and identify patterns.    Best Artificial Intelligence Rahel  Youper: An AI-based rahel that offers therapy sessions and mood tracking.    Best for Establishing Healthier Thought Habits  What’s Up?: A self-help rahel that uses Cognitive Behavioral Therapy (CBT) techniques to help users manage their thoughts and emotions.    Best Mindfulness Resource  [facility]: Offers tools for anxiety and depression, including mindfulness exercises, CBT, and a supportive community.    Best Gamified Option  Happify: Uses games and activities to help reduce stress and worry.    Best for Tracking Progress  CBT Tools for Healthy Living, Self-Help Mood Diary: Helps users track their progress and manage their mental health.    Best for Meditation  Headspace: A meditation rahel that helps users practice mindfulness and reduce stress.    Best for Sleep  Calm: Offers sleep stories,  meditations, and music to help users relax and sleep better.    Best for Self-Guided Therapy  [employer]: An AI-based yoni that provides self-guided therapy and emotional support.    Best for Anxiety  MindShift: Designed to help users manage anxiety through CBT techniques and mindfulness.

## 2024-10-17 NOTE — ED NOTES
The attending physician requested resources for the patient struggling with anxiety and situational depression. The patient denies suicidal ideation (SI) and homicidal ideation (HI), and does not meet the criteria for inpatient behavioral health (IPBH) hospitalization. Consequently, a complete crisis consult is not required. Therapy and counseling resources that accept the patient's insurance were researched and provided to the patient. Coping skills were added to the discharge instructions, along with highly recommended apps for adults to help cope with depression and anxiety. All resources were entered in the patient's discharge instructions.

## 2024-10-17 NOTE — Clinical Note
Isa accompanied Bethanie Cronin to the emergency department on 10/17/2024.    Return date if applicable: 10/19/2024    Please excuse Isa from work on 10/18/24 as she accompanied her daughter to the ED.    If you have any questions or concerns, please don't hesitate to call.      Chan Vazquez MD

## 2024-10-17 NOTE — Clinical Note
Bethanie Cronin was seen and treated in our emergency department on 10/17/2024.    No restrictions            Diagnosis:     Bethanie  may return to work on return date.    She may return on this date: 10/18/2024         If you have any questions or concerns, please don't hesitate to call.      Chan Vazquez MD    ______________________________           _______________          _______________  Hospital Representative                              Date                                Time

## 2024-10-18 NOTE — ED ATTENDING ATTESTATION
I, Mackenzie Cohen MD, saw and evaluated the patient. I have discussed the patient with the resident/non-physician practitioner and agree with the resident's/non-physician practitioner's findings, Plan of Care, and MDM as documented in the resident's/non-physician practitioner's note, except where noted. All available labs and Radiology studies were reviewed.  I was present for key portions of any procedure(s) performed by the resident/non-physician practitioner and I was immediately available to provide assistance.       At this point I agree with the current assessment done in the Emergency Department.  I have conducted an independent evaluation of this patient a history and physical is as follows:    Subjective: 18-year-old female with history of anemia and depression presenting with several weeks of abdominal bloating with associated nausea.  She reports that she recently has had a UTI and has been taking cefdinir.  She denies history of sexual activity or any pelvic complaints today.  She otherwise denies any associated symptoms.    Objective: Healthy appearing 18-year-old female with nontender abdomen and with some depressed mood without SI/HI or AVH on examination.  Vital signs stable and afebrile.    Assessment/Plan: 18-year-old female with bloating and nausea and benign abdominal exam.  Labs notable for UTI.  Patient prescribed Bactrim and given resources for depression.  Patient was discharged to home with strict return precautions. Patient acknowledged understanding of plan and diagnostic results, and all their questions were answered to their satisfaction.        ED Course         Critical Care Time  Procedures

## 2024-10-20 DIAGNOSIS — F41.1 GENERALIZED ANXIETY DISORDER WITH PANIC ATTACKS: ICD-10-CM

## 2024-10-20 DIAGNOSIS — F33.1 MAJOR DEPRESSIVE DISORDER, RECURRENT, MODERATE (HCC): ICD-10-CM

## 2024-10-20 DIAGNOSIS — F41.0 GENERALIZED ANXIETY DISORDER WITH PANIC ATTACKS: ICD-10-CM

## 2024-10-20 LAB — BACTERIA UR CULT: ABNORMAL

## 2024-10-21 ENCOUNTER — TELEPHONE (OUTPATIENT)
Dept: FAMILY MEDICINE CLINIC | Facility: CLINIC | Age: 19
End: 2024-10-21

## 2024-10-21 DIAGNOSIS — K59.09 OTHER CONSTIPATION: ICD-10-CM

## 2024-10-21 DIAGNOSIS — R14.0 BLOATING: Primary | ICD-10-CM

## 2024-10-21 RX ORDER — VENLAFAXINE HYDROCHLORIDE 37.5 MG/1
37.5 CAPSULE, EXTENDED RELEASE ORAL DAILY
Qty: 30 CAPSULE | Refills: 0 | Status: SHIPPED | OUTPATIENT
Start: 2024-10-21 | End: 2024-10-22 | Stop reason: SDUPTHER

## 2024-10-21 NOTE — TELEPHONE ENCOUNTER
Left msg stating to let us know what his mothers schedule is to try and schedule an appt with dr blackmon

## 2024-10-22 ENCOUNTER — NURSE TRIAGE (OUTPATIENT)
Age: 19
End: 2024-10-22

## 2024-10-22 ENCOUNTER — TELEPHONE (OUTPATIENT)
Age: 19
End: 2024-10-22

## 2024-10-22 DIAGNOSIS — F33.1 MAJOR DEPRESSIVE DISORDER, RECURRENT, MODERATE (HCC): ICD-10-CM

## 2024-10-22 DIAGNOSIS — F41.0 GENERALIZED ANXIETY DISORDER WITH PANIC ATTACKS: ICD-10-CM

## 2024-10-22 DIAGNOSIS — F41.1 GENERALIZED ANXIETY DISORDER WITH PANIC ATTACKS: ICD-10-CM

## 2024-10-22 RX ORDER — VENLAFAXINE HYDROCHLORIDE 75 MG/1
75 CAPSULE, EXTENDED RELEASE ORAL DAILY
Qty: 30 CAPSULE | Refills: 0 | Status: SHIPPED | OUTPATIENT
Start: 2024-10-22

## 2024-10-22 NOTE — TELEPHONE ENCOUNTER
Called Isa back. She said that Bethanie is all over the place. She cries nonstop and then other times she has angry outbursts. She has had panic attacks where she shakes and cries for hours. She wakes Isa up at night saying her brain doesn't feel good. Isa feels things are getting worse and she is so much more depressed. Bethanie is willing to try the increase in effexor xr. In regards to PHP, Bethanie gets social anxiety so they are hesitant to do that.

## 2024-10-22 NOTE — TELEPHONE ENCOUNTER
Isa is calling to ask for help. She does not know what to do regarding Bethanie's condition. She needs to speak about possible option of going to PHP. Bethanie is crying, not eating, or sleeping. She is having panic attacks where she is shaking for an hour. She is diagnosed with Autism so Isa thinks Bethanie should have a more personalized experience for inpatient facilities. Possibly a one on one person.  Isa doesn't think they can wait until Friday's appt when they see Dr Pérez.  Isa is asking for a return call today.

## 2024-10-22 NOTE — TELEPHONE ENCOUNTER
" calls with concerns regarding the patient. Patient has a PMH of depression, psychotic behavior, autism and generalized anxiety. Patient asked her Mom to take her to an inpatient facility. CG had called the wrong Dr. Pérez by mistake.   Reason for Disposition   Very strange or confused behavior    Answer Assessment - Initial Assessment Questions  1. CONCERN: \"What happened that made you call today?\"      Patient called the CG regarding \"My brain is not well\"  2. DEPRESSION SYMPTOM SCREENING: \"How are you feeling overall?\" (e.g., decreased energy, increased sleeping or difficulty sleeping, difficulty concentrating, feelings of sadness, guilt, hopelessness, or worthlessness)      Difficulty with eating, crying, sadness, poor concentration  3. RISK OF HARM - SUICIDAL IDEATION:  \"Do you ever have thoughts of hurting or killing yourself?\"  (e.g., yes, no, no but preoccupation with thoughts about death)    - INTENT:  \"Do you have thoughts of hurting or killing yourself right NOW?\" (e.g., yes, no, N/A)    - PLAN: \"Do you have a specific plan for how you would do this?\" (e.g., gun, knife, overdose, no plan, N/A)      denies  4. RISK OF HARM - HOMICIDAL IDEATION:  \"Do you ever have thoughts of hurting or killing someone else?\"  (e.g., yes, no, no but preoccupation with thoughts about death)    - INTENT:  \"Do you have thoughts of hurting or killing someone right NOW?\" (e.g., yes, no, N/A)    - PLAN: \"Do you have a specific plan for how you would do this?\" (e.g., gun, knife, no plan, N/A)       denies  5. FUNCTIONAL IMPAIRMENT: \"How have things been going for you overall? Have you had more difficulty than usual doing your normal daily activities?\"  (e.g., better, same, worse; self-care, school, work, interactions)      Patient is not leaving the house  6. SUPPORT: \"Who is with you now?\" \"Who do you live with?\" \"Do you have family or friends who you can talk to?\"       parents  7. THERAPIST: \"Do you have a counselor or " "therapist? Name?\"      Patient has a therapist (counseling)  8. STRESSORS: \"Has there been any new stress or recent changes in your life?\"      Not that the CG knows of   9. ALCOHOL USE OR SUBSTANCE USE (DRUG USE): \"Do you drink alcohol or use any illegal drugs?\"      Denies-vapes  10. OTHER: \"Do you have any other physical symptoms right now?\" (e.g., fever)        Not performing physical personal care  11. PREGNANCY: \"Is there any chance you are pregnant?\" \"When was your last menstrual period?\"        denies    Protocols used: Depression-ADULT-[place]    "

## 2024-10-23 ENCOUNTER — TELEPHONE (OUTPATIENT)
Dept: PSYCHOLOGY | Facility: CLINIC | Age: 19
End: 2024-10-23

## 2024-10-24 ENCOUNTER — TELEPHONE (OUTPATIENT)
Dept: PSYCHOLOGY | Facility: CLINIC | Age: 19
End: 2024-10-24

## 2024-10-24 NOTE — TELEPHONE ENCOUNTER
Called pt today from waiting list and [facility] offering VIRTUAL PHP to start next week upon returning my call soon.

## 2024-10-25 ENCOUNTER — OFFICE VISIT (OUTPATIENT)
Dept: PSYCHIATRY | Facility: CLINIC | Age: 19
End: 2024-10-25
Payer: COMMERCIAL

## 2024-10-25 ENCOUNTER — HOSPITAL ENCOUNTER (EMERGENCY)
Facility: HOSPITAL | Age: 19
Discharge: HOME | End: 2024-10-25
Attending: EMERGENCY MEDICINE
Payer: COMMERCIAL

## 2024-10-25 VITALS
OXYGEN SATURATION: 99 % | HEART RATE: 96 BPM | RESPIRATION RATE: 18 BRPM | TEMPERATURE: 98.5 F | SYSTOLIC BLOOD PRESSURE: 135 MMHG | DIASTOLIC BLOOD PRESSURE: 68 MMHG

## 2024-10-25 DIAGNOSIS — F41.0 GENERALIZED ANXIETY DISORDER WITH PANIC ATTACKS: ICD-10-CM

## 2024-10-25 DIAGNOSIS — Z00.8 ENCOUNTER FOR PSYCHOLOGICAL EVALUATION: ICD-10-CM

## 2024-10-25 DIAGNOSIS — F33.2 MAJOR DEPRESSIVE DISORDER, RECURRENT SEVERE WITHOUT PSYCHOTIC FEATURES (HCC): Primary | ICD-10-CM

## 2024-10-25 DIAGNOSIS — F32.A DEPRESSION: Primary | ICD-10-CM

## 2024-10-25 DIAGNOSIS — F41.1 GENERALIZED ANXIETY DISORDER WITH PANIC ATTACKS: ICD-10-CM

## 2024-10-25 LAB
AMPHETAMINES SERPL QL SCN: NEGATIVE
ANION GAP SERPL CALCULATED.3IONS-SCNC: 7 MMOL/L (ref 4–13)
BARBITURATES UR QL: NEGATIVE
BENZODIAZ UR QL: NEGATIVE
BUN SERPL-MCNC: 7 MG/DL (ref 5–25)
CALCIUM SERPL-MCNC: 9.7 MG/DL (ref 8.4–10.2)
CHLORIDE SERPL-SCNC: 106 MMOL/L (ref 96–108)
CO2 SERPL-SCNC: 24 MMOL/L (ref 21–32)
COCAINE UR QL: NEGATIVE
CREAT SERPL-MCNC: 0.56 MG/DL (ref 0.6–1.3)
ETHANOL EXG-MCNC: 0 MG/DL
EXT PREGNANCY TEST URINE: NEGATIVE
EXT. CONTROL: NORMAL
FENTANYL UR QL SCN: NEGATIVE
GFR SERPL CREATININE-BSD FRML MDRD: 134 ML/MIN/1.73SQ M
GLUCOSE SERPL-MCNC: 161 MG/DL (ref 65–140)
HYDROCODONE UR QL SCN: NEGATIVE
METHADONE UR QL: NEGATIVE
OPIATES UR QL SCN: NEGATIVE
OXYCODONE+OXYMORPHONE UR QL SCN: NEGATIVE
PCP UR QL: NEGATIVE
POTASSIUM SERPL-SCNC: 3.4 MMOL/L (ref 3.5–5.3)
SODIUM SERPL-SCNC: 137 MMOL/L (ref 135–147)
THC UR QL: NEGATIVE

## 2024-10-25 PROCEDURE — 99284 EMERGENCY DEPT VISIT MOD MDM: CPT

## 2024-10-25 PROCEDURE — 80048 BASIC METABOLIC PNL TOTAL CA: CPT | Performed by: STUDENT IN AN ORGANIZED HEALTH CARE EDUCATION/TRAINING PROGRAM

## 2024-10-25 PROCEDURE — 99214 OFFICE O/P EST MOD 30 MIN: CPT | Performed by: PSYCHIATRY & NEUROLOGY

## 2024-10-25 PROCEDURE — 81025 URINE PREGNANCY TEST: CPT | Performed by: STUDENT IN AN ORGANIZED HEALTH CARE EDUCATION/TRAINING PROGRAM

## 2024-10-25 PROCEDURE — 90833 PSYTX W PT W E/M 30 MIN: CPT | Performed by: PSYCHIATRY & NEUROLOGY

## 2024-10-25 PROCEDURE — 82075 ASSAY OF BREATH ETHANOL: CPT

## 2024-10-25 PROCEDURE — 36415 COLL VENOUS BLD VENIPUNCTURE: CPT | Performed by: STUDENT IN AN ORGANIZED HEALTH CARE EDUCATION/TRAINING PROGRAM

## 2024-10-25 PROCEDURE — 80307 DRUG TEST PRSMV CHEM ANLYZR: CPT

## 2024-10-25 RX ORDER — LANOLIN ALCOHOL/MO/W.PET/CERES
3 CREAM (GRAM) TOPICAL
Status: DISCONTINUED | OUTPATIENT
Start: 2024-10-25 | End: 2024-10-26 | Stop reason: HOSPADM

## 2024-10-25 RX ORDER — VENLAFAXINE 37.5 MG/1
75 TABLET ORAL DAILY
Status: DISCONTINUED | OUTPATIENT
Start: 2024-10-25 | End: 2024-10-25

## 2024-10-25 RX ORDER — FAMOTIDINE 20 MG/1
20 TABLET, FILM COATED ORAL 2 TIMES DAILY
Status: DISCONTINUED | OUTPATIENT
Start: 2024-10-25 | End: 2024-10-26 | Stop reason: HOSPADM

## 2024-10-25 RX ORDER — VENLAFAXINE HYDROCHLORIDE 75 MG/1
75 CAPSULE, EXTENDED RELEASE ORAL DAILY
Status: DISCONTINUED | OUTPATIENT
Start: 2024-10-25 | End: 2024-10-26 | Stop reason: HOSPADM

## 2024-10-25 RX ADMIN — FAMOTIDINE 20 MG: 20 TABLET ORAL at 22:06

## 2024-10-25 NOTE — ED PROVIDER NOTES
Time reflects when diagnosis was documented in both MDM as applicable and the Disposition within this note       Time User Action Codes Description Comment    10/25/2024 10:53 PM Pedro Banegas [F32.A] Depression     10/25/2024 10:53 PM Pedro Banegas [Z00.8] Encounter for psychological evaluation           ED Disposition       ED Disposition   Discharge    Condition   Stable    Date/Time   Fri Oct 25, 2024 10:53 PM    Comment   Bethanie Cronin discharge to home/self care.                   Assessment & Plan       Medical Decision Making  Patient seen and examined.  She denies SI/HI however she endorses intrusive thoughts and images of her dying gruesome deaths.  Patient is agreeable to inpatient hospitalization for psychiatric treatment.  Will consult crisis team for disposition.    Patient has calm down substantially and no longer wishes to wait for the crisis team.  She states she is feeling slightly better now that she is calm and continues to deny SI/HI.  She states she has an online intensive partial program set up for her she just needs to make a phone call to start that.  She plans to make the phone call tomorrow and start the program on Monday.  She also has an increased dose of Effexor prescribed by her psychiatrist that is waiting at the pharmacy for pickup, she is agreeable to take this increased dose.  I discussed the possibility of leaving with mom who expressed that she is comfortable taking the patient home and feels that the patient will be safe and she can keep the patient safe at home.  Given this patient's lack of active suicidal ideation or plan, intensive partial program already set up, and mother's comfort in taking her home this physician feels comfortable discharging the patient into the mother's care.  Strict return precautions discussed and the patient reassured that should she need care we are always here to help.  All questions answered.    Amount and/or Complexity of Data  Reviewed  Labs: ordered.    Risk  OTC drugs.  Prescription drug management.             Medications   famotidine (PEPCID) tablet 20 mg (20 mg Oral Given 10/25/24 2206)   venlafaxine (EFFEXOR-XR) 24 hr capsule 75 mg (75 mg Oral Not Given 10/25/24 2206)   melatonin tablet 3 mg (3 mg Oral Not Given 10/25/24 2206)       ED Risk Strat Scores                                               History of Present Illness       Chief Complaint   Patient presents with    Psychiatric Evaluation     Per Mom pt sent in from psych appointment for increased depression, isolation, and dissociation. GCS15. Denies SI/HI/AH/VH       Past Medical History:   Diagnosis Date    Anemia     Anxiety     Depression     Frequent headaches 05/20/2021    GERD (gastroesophageal reflux disease)     Iron (Fe) deficiency anemia       History reviewed. No pertinent surgical history.   Family History   Problem Relation Age of Onset    No Known Problems Mother     No Known Problems Father     Cancer Maternal Grandmother         ovarian cancer    Cancer Maternal Grandfather         prostate cancer      Social History     Tobacco Use    Smoking status: Every Day     Current packs/day: 1.00     Types: Cigarettes    Smokeless tobacco: Never    Tobacco comments:     Dealing with a lot of anxiety. Refer to Behavioral Health   Vaping Use    Vaping status: Never Used   Substance Use Topics    Alcohol use: Never    Drug use: Never      E-Cigarette/Vaping    E-Cigarette Use Never User       E-Cigarette/Vaping Substances    Nicotine Yes every day    THC No     CBD No     Flavoring No     Other No     Unknown No       I have reviewed and agree with the history as documented.     18-year-old female presenting with depressive symptoms and passive suicidal ideation.  Per mom the patient has had psychiatric issues for a long time and has been having difficulty seeing a psychiatrist.  She recently got up with a psychiatrist and has tried a few medications that do not seem  to work.  Patient has autism and social anxiety which have made managing treatment more difficult.  She has never had inpatient psychiatric treatment before and is requesting about this time.  She states she does not have any plans to kill herself however she has intrusive thoughts some ways of her dying multiple times a day.      Psychiatric Evaluation  Associated symptoms: no abdominal pain, no chest pain and no headaches        Review of Systems   Constitutional:  Negative for chills, diaphoresis and fever.   HENT:  Negative for congestion, ear pain, postnasal drip, rhinorrhea and sore throat.    Eyes:  Negative for pain and visual disturbance.   Respiratory:  Negative for cough, chest tightness and shortness of breath.    Cardiovascular:  Negative for chest pain and palpitations.   Gastrointestinal:  Negative for abdominal pain, constipation, diarrhea, nausea and vomiting.   Genitourinary:  Negative for dysuria and hematuria.   Musculoskeletal:  Negative for arthralgias and back pain.   Skin:  Negative for color change and rash.   Neurological:  Negative for dizziness, seizures, syncope, weakness, light-headedness, numbness and headaches.   All other systems reviewed and are negative.          Objective       ED Triage Vitals [10/25/24 1733]   Temperature Pulse Blood Pressure Respirations SpO2 Patient Position - Orthostatic VS   98.5 °F (36.9 °C) 96 135/68 18 99 % Sitting      Temp Source Heart Rate Source BP Location FiO2 (%) Pain Score    Oral Monitor Right arm -- --      Vitals      Date and Time Temp Pulse SpO2 Resp BP Pain Score FACES Pain Rating User   10/25/24 1733 98.5 °F (36.9 °C) 96 99 % 18 135/68 -- -- TS            Physical Exam  Constitutional:       General: She is not in acute distress.     Appearance: She is not diaphoretic.   HENT:      Head: Normocephalic and atraumatic.      Nose: No congestion or rhinorrhea.      Mouth/Throat:      Mouth: Mucous membranes are moist.      Pharynx: No  oropharyngeal exudate.   Eyes:      General: No scleral icterus.  Cardiovascular:      Rate and Rhythm: Normal rate and regular rhythm.      Heart sounds: Normal heart sounds. No murmur heard.     No friction rub. No gallop.   Pulmonary:      Effort: No respiratory distress.      Breath sounds: Normal breath sounds. No wheezing, rhonchi or rales.   Abdominal:      General: Abdomen is flat. There is no distension.      Palpations: Abdomen is soft.      Tenderness: There is no abdominal tenderness. There is no guarding.   Lymphadenopathy:      Cervical: No cervical adenopathy.   Skin:     General: Skin is warm and dry.      Capillary Refill: Capillary refill takes less than 2 seconds.      Findings: No rash.   Neurological:      General: No focal deficit present.      Mental Status: She is alert and oriented to person, place, and time.         Results Reviewed       Procedure Component Value Units Date/Time    Basic metabolic panel [[id number]]  (Abnormal) Collected: 10/25/24 2002    Lab Status: Final result Specimen: Blood from Arm, Right Updated: 10/25/24 2034     Sodium 137 mmol/L      Potassium 3.4 mmol/L      Chloride 106 mmol/L      CO2 24 mmol/L      ANION GAP 7 mmol/L      BUN 7 mg/dL      Creatinine 0.56 mg/dL      Glucose 161 mg/dL      Calcium 9.7 mg/dL      eGFR 134 ml/min/1.73sq m     Narrative:      National Kidney Disease Foundation guidelines for Chronic Kidney Disease (CKD):     Stage 1 with normal or high GFR (GFR > 90 mL/min/1.73 square meters)    Stage 2 Mild CKD (GFR = 60-89 mL/min/1.73 square meters)    Stage 3A Moderate CKD (GFR = 45-59 mL/min/1.73 square meters)    Stage 3B Moderate CKD (GFR = 30-44 mL/min/1.73 square meters)    Stage 4 Severe CKD (GFR = 15-29 mL/min/1.73 square meters)    Stage 5 End Stage CKD (GFR <15 mL/min/1.73 square meters)  Note: GFR calculation is accurate only with a steady state creatinine    Rapid drug screen, urine [[id number]]  (Normal) Collected: 10/25/24 1942     Lab Status: Final result Specimen: Urine, Clean Catch Updated: 10/25/24 2010     Amph/Meth UR Negative     Barbiturate Ur Negative     Benzodiazepine Urine Negative     Cocaine Urine Negative     Methadone Urine Negative     Opiate Urine Negative     PCP Ur Negative     THC Urine Negative     Oxycodone Urine Negative     Fentanyl Urine Negative     HYDROCODONE URINE Negative    Narrative:      FOR MEDICAL PURPOSES ONLY.   IF CONFIRMATION NEEDED PLEASE CONTACT THE LAB WITHIN 5 DAYS.    Drug Screen Cutoff Levels:  AMPHETAMINE/METHAMPHETAMINES  1000 ng/mL  BARBITURATES     200 ng/mL  BENZODIAZEPINES     200 ng/mL  COCAINE      300 ng/mL  METHADONE      300 ng/mL  OPIATES      300 ng/mL  PHENCYCLIDINE     25 ng/mL  THC       50 ng/mL  OXYCODONE      100 ng/mL  FENTANYL      5 ng/mL  HYDROCODONE     300 ng/mL    POCT pregnancy, urine [[id number]]  (Normal) Resulted: 10/25/24 2000    Lab Status: Final result Updated: 10/25/24 2000     EXT Preg Test, Ur Negative     Control Valid    POCT alcohol breath test [[id number]]  (Normal) Resulted: 10/25/24 1829    Lab Status: Final result Updated: 10/25/24 1829     EXTBreath Alcohol 0.000            No orders to display       Procedures    ED Medication and Procedure Management   Prior to Admission Medications   Prescriptions Last Dose Informant Patient Reported? Taking?   SUMAtriptan (IMITREX) 100 mg tablet  Self No No   Sig: PLEASE SEE ATTACHED FOR DETAILED DIRECTIONS   pantoprazole (PROTONIX) 40 mg tablet  Self No No   Sig: Take 1 tablet (40 mg total) by mouth daily   venlafaxine (EFFEXOR-XR) 75 mg 24 hr capsule   No No   Sig: Take 1 capsule (75 mg total) by mouth daily      Facility-Administered Medications: None     Patient's Medications   Discharge Prescriptions    No medications on file     No discharge procedures on file.  ED SEPSIS DOCUMENTATION   Time reflects when diagnosis was documented in both MDM as applicable and the Disposition within this note       Time User  Action Codes Description Comment    10/25/2024 10:53 PM Pedro Banegas [F32.A] Depression     10/25/2024 10:53 PM Pedro Banegas [Z00.8] Encounter for psychological evaluation                  Pedro Banegas MD  10/25/24 7362

## 2024-10-25 NOTE — PSYCH
MEDICATION MANAGEMENT NOTE        [facility]      Name and Date of Birth:  Bethanie Cronin 18 y.o. 2005 MRN: [id number]    Date of Visit: October 25, 2024      Assessment & Plan  Major depressive disorder, recurrent severe without psychotic features (HCC)    Generalized anxiety disorder with panic attacks        Reason for Visit: Follow-up for medication management.  The patient came with her mother.    Subjective: This is a 18-year-old  female currently living with her parents in [place], [place].  She had initial psychiatric evaluation done by me on September 17, 2012 24 and was diagnosed with major depressive disorder, recurrent, moderate in severity along with generalized anxiety disorder with panic attacks.  The patient had been diagnosed with bipolar disorder type II in the past but based on my evaluation the patient did not seem to be meeting the full criteria for it.  She was started on Lexapro at that visit but the patient had significant side effects on it and it was discontinued.  She was subsequently started on Effexor XR 37.5 mg daily and the dose was increased 3 days back to 75 mg daily.  Though the patient today stated that she stopped taking Effexor 3 days back and never increased the dose.  Her mother stated she was concerned about her possible adverse effects to the Effexor on a higher dose.  Please check my HPI from September 17 for more details.    The patient came for her follow-up appointment with me today.  The patient was extremely dysphoric during the appointment.  The patient reports not doing well and had been very depressed.  The patient could not specify any stressor but major stressor as per the patient during last visit was her living situation.  Reports she lives with her parents and grandmother in a small house and she has to share space with her parents.  Was extremely stressed about her not having any privacy.   The patient could not specify any other stresses today.  The patient denies any active suicidal thoughts but she also endorses passive death wish.  Reports she would not care if she die.  She though denies any intent or plan to hurt herself.  She reports her sleep has been up and down.  Reports some days she might sleep up to 12 hours and other days might not be able to sleep for more than few hours.  Reports appetite was very poor until recently.  Feels it has been a little bit better now.  Reports energy level has been low and concentration has been poor.  As mentioned the patient was extremely tearful throughout the meeting.  Her speech was also very slow compared to her last 2 visits.  Reports anxiety also has been very high lately.  Denies any panic attacks but her mother feels she has been extremely anxious all the time.  She denies any manic or hypomanic symptoms since she last saw me.  Her mother again confirmed the above-mentioned concerns by patient.  Based on the patient presentation especially worsening of her depression despite of medication changes I recommended considering psychiatric inpatient admission for few days for  stability and medication adjustment.  Patient was was agreeable for inpatient admission.  She was also given other alternatives including partial hospitalization but the patient feels hospitalization will be a better option at this time.  I also have sent a referral for partial program few days back but the patient had not called the partial program till now.     She also denies being on any other psychiatric medication at this time.      Review Of Systems: Negative other than mentioned above.      Constitutional feeling poorly, feeling tired, and low energy   ENT negative   Cardiovascular negative   Respiratory negative   Gastrointestinal negative   Genitourinary negative   Musculoskeletal negative   Integumentary negative   Neurological negative   Endocrine negative   Other  Symptoms none         Suicide/Homicide Risk Assessment:    Risk of Harm to Self:  The following ratings are based on assessment at the time of the interview and observation over the last 3 months  Demographic risk factors include: , never , age: young adult (15-24)  Historical Risk Factors include: history of depression, history of anxiety  Recent Specific Risk Factors include: diagnosis of depression, current anxiety symptoms, passive death wishes, social isolation, unemployed  Protective Factors: no current suicidal ideation, access to mental health treatment, compliant with mental health treatment, supportive father and mother, supportive family  Weapons: none and no firearms. The following steps have been taken to ensure weapons are properly secured: not applicable  Based on today's assessment, Bethanie presents the following risk of harm to self:  Low to moderate    Risk of Harm to Others:  The following ratings are based on assessment at the time of the interview  Based on today's assessment, Bethanie presents the following risk of harm to others: none    The following interventions are recommended: contracts for safety at present - agrees to go to ED if feeling unsafe, contracts for safety at present - agrees to call Crisis Intervention Service if feeling unsafe    Alcohol/Substance Abuse:        Past Medical History:    Past Medical History:   Diagnosis Date    Anemia     Anxiety     Depression     Frequent headaches 05/20/2021        No past surgical history on file.  No Known Allergies    Current Medications:       Current Outpatient Medications:     pantoprazole (PROTONIX) 40 mg tablet, Take 1 tablet (40 mg total) by mouth daily, Disp: 30 tablet, Rfl: 11    SUMAtriptan (IMITREX) 100 mg tablet, PLEASE SEE ATTACHED FOR DETAILED DIRECTIONS, Disp: 9 tablet, Rfl: 0    venlafaxine (EFFEXOR-XR) 75 mg 24 hr capsule, Take 1 capsule (75 mg total) by mouth daily, Disp: 30 capsule, Rfl: 0       History  Review: The following portions of the patient's history were reviewed and updated as appropriate: allergies, current medications, past family history, past medical history, past social history, past surgical history, and problem list.         OBJECTIVE:     Vital signs in last 24 hours:    There were no vitals filed for this visit.    Mental Status Evaluation:    Appearance age appropriate, casually dressed   Behavior cooperative, calm, psychomotor retardation   Speech slow, soft   Mood dysphoric   Affect tearful   Thought Processes linear   Associations intact associations   Thought Content no overt delusions   Perceptual Disturbances: no auditory hallucinations, no visual hallucinations   Abnormal Thoughts  Risk Potential Suicidal ideation - passive death wish, but denies any active suicidal ideation, intent or plan at present  Homicidal ideation - None  Potential for aggression - No   Orientation oriented to person, place, time/date, and situation   Memory recent and remote memory grossly intact   Consciousness alert and awake   Attention Span Concentration Span attention span and concentration are age appropriate   Intellect appears to be of average intelligence   Insight intact   Judgement intact   Muscle Strength and  Gait normal gait and normal balance   Motor activity no abnormal movements   Language no difficulty naming common objects, no difficulty repeating a phrase   Fund of Knowledge adequate knowledge of current events  adequate fund of knowledge regarding past history  adequate fund of knowledge regarding vocabulary    Pain none   Pain Scale 0       Laboratory Results: Most Recent Labs:   Lab Results   Component Value Date    WBC 10.12 10/17/2024    RBC 4.35 10/17/2024    HGB 9.8 (L) 10/17/2024    HCT 33.4 (L) 10/17/2024     10/17/2024    RDW 14.7 10/17/2024    NEUTROABS 6.88 10/17/2024    SODIUM 137 10/17/2024    K 4.6 10/17/2024     10/17/2024    CO2 23 10/17/2024    BUN 14 10/17/2024     CREATININE 0.62 10/17/2024    CALCIUM 9.4 10/17/2024    AST 21 10/17/2024    ALT 9 10/17/2024    ALKPHOS 56 10/17/2024    TP 7.8 10/17/2024    TBILI 0.85 10/17/2024    CHOLESTEROL 111 09/22/2022    TRIG 79 09/22/2022    HDL 47 (L) 09/22/2022    LDLCALC 48 09/22/2022    NONHDLC 64 09/22/2022    TGO7FGRJXLFM 1.419 09/22/2024     I have personally reviewed all pertinent laboratory/tests results.    Assessment/Plan: Patient presents very depressed and anxious which seems further aggravated by not taking medications for last few days.  The patient though already was very depressed when she had called us few days back and her Effexor XR dose was increased to 75 mg daily but as mentioned was noncompliant for last 3 days.  Based on her presentation today especially worsening depression and anxiety  along with passive death wish, I feel patient will benefit from psychiatric inpatient admission especially for medication changes or adjustments along with group and supportive psychotherapy in the inpatient setting.  Patient and her mother was both agreeable with the plan.  The patient also though was advised in case she changes her mind and does not want to go inpatient then she should restart Effexor XR 37.5 mg 1 capsule daily for next 2 days and then can go up to 75 mg daily for management of depression and anxiety.  She also was advised then to  call back the partial program to get connected with it.  The referral was already sent by me 3 days back.  Patient and her mother verbalized understanding agrees with the plan.  She also was advised to call us if there is any concern in case she does not go into the inpatient unit or to call crisis or 911 in case of any emergency or SI.  They agreed      Diagnoses and all orders for this visit:    Major depressive disorder, recurrent severe without psychotic features (HCC)    Generalized anxiety disorder with panic attacks          Treatment Recommendations/Precautions:      Aware  of 24 hour and weekend coverage for urgent situations accessed by calling [facility] main practice number    Risks/Benefits      Risks, Benefits And Possible Side Effects Of Medications:    Risks, benefits, and possible side effects of medications explained to Bethanie and she verbalizes understanding and agreement for treatment.  Risks of medications in pregnancy explained to Bethanie. She verbalizes understanding and agrees to notify her doctor if she becomes pregnant.    Controlled Medication Discussion:     Not applicable    Psychotherapy Provided:     Individual psychotherapy provided: Counseling was provided during the session today for 16 minutes.  Medications, treatment progress and treatment plan reviewed with Bethanie.  Medication education provided to Bethanie.  Reassurance and supportive therapy provided.   Crisis/safety plan discussed with Bethanie.     Treatment Plan;    Completed and signed during the session: Not applicable - Treatment Plan not due at this session  Visit Time    Visit Start Time: 4 PM  Visit Stop Time: 4:37 PM  Total Visit Duration:  37 minutes    Administrative Statements   I have spent a total time of 37 minutes in caring for this patient on the day of the visit/encounter including Risks and benefits of tx options, Instructions for management, Patient and family education, Importance of tx compliance, Risk factor reductions, Impressions, Counseling / Coordination of care, and Reviewing / ordering tests, medicine, procedures  .    Madison Pérez MD 10/25/24

## 2024-10-26 NOTE — DISCHARGE INSTRUCTIONS
You were seen today for psychiatric evaluation.  You already have a intensive partial program set up, please call the program tomorrow to let them know you are ready to start.  Your increased dose of Effexor is already waiting for you at the pharmacy, please pick this up first thing in the morning and begin taking it as prescribed.    Please return to the ER if you experience suicidal thoughts or ideations, increased depressive symptoms, or if you become concerned for her safety.

## 2024-10-29 ENCOUNTER — HOSPITAL ENCOUNTER (OUTPATIENT)
Dept: INFUSION CENTER | Facility: CLINIC | Age: 19
Discharge: HOME | End: 2024-10-29
Payer: COMMERCIAL

## 2024-10-29 VITALS
DIASTOLIC BLOOD PRESSURE: 68 MMHG | OXYGEN SATURATION: 99 % | RESPIRATION RATE: 18 BRPM | SYSTOLIC BLOOD PRESSURE: 104 MMHG | TEMPERATURE: 98 F | HEART RATE: 84 BPM

## 2024-10-29 DIAGNOSIS — E53.8 CYANOCOBALAMIN DEFICIENCY: Primary | ICD-10-CM

## 2024-10-29 DIAGNOSIS — D50.8 OTHER IRON DEFICIENCY ANEMIA: ICD-10-CM

## 2024-10-29 PROCEDURE — 96372 THER/PROPH/DIAG INJ SC/IM: CPT

## 2024-10-29 PROCEDURE — 96365 THER/PROPH/DIAG IV INF INIT: CPT

## 2024-10-29 RX ORDER — CYANOCOBALAMIN 1000 UG/ML
1000 INJECTION, SOLUTION INTRAMUSCULAR; SUBCUTANEOUS ONCE
Status: CANCELLED | OUTPATIENT
Start: 2024-11-06 | End: 2024-11-05

## 2024-10-29 RX ORDER — SODIUM CHLORIDE 9 MG/ML
20 INJECTION, SOLUTION INTRAVENOUS ONCE
Status: COMPLETED | OUTPATIENT
Start: 2024-10-29 | End: 2024-10-29

## 2024-10-29 RX ORDER — SODIUM CHLORIDE 9 MG/ML
20 INJECTION, SOLUTION INTRAVENOUS ONCE
Status: CANCELLED | OUTPATIENT
Start: 2024-11-06

## 2024-10-29 RX ORDER — CYANOCOBALAMIN 1000 UG/ML
1000 INJECTION, SOLUTION INTRAMUSCULAR; SUBCUTANEOUS ONCE
Status: COMPLETED | OUTPATIENT
Start: 2024-10-29 | End: 2024-10-29

## 2024-10-29 RX ADMIN — IRON SUCROSE 200 MG: 20 INJECTION, SOLUTION INTRAVENOUS at 16:29

## 2024-10-29 RX ADMIN — SODIUM CHLORIDE 20 ML/HR: 9 INJECTION, SOLUTION INTRAVENOUS at 16:29

## 2024-10-29 RX ADMIN — CYANOCOBALAMIN 1000 MCG: 1000 INJECTION, SOLUTION INTRAMUSCULAR at 16:29

## 2024-10-29 NOTE — PROGRESS NOTES
Patient [facility] to [facility] for Venofer and B12. Patient offers no complaints at this time. B12 administered into L arm. Patient tolerated treatment without incident. Next appt confirmed with patient for 11/6 at 4pm at [facility]. AVS declined.

## 2024-10-29 NOTE — ED ATTENDING ATTESTATION
I, Mackenzie Cohen MD, saw and evaluated the patient. I have discussed the patient with the resident/non-physician practitioner and agree with the resident's/non-physician practitioner's findings, Plan of Care, and MDM as documented in the resident's/non-physician practitioner's note, except where noted. All available labs and Radiology studies were reviewed.  I was present for key portions of any procedure(s) performed by the resident/non-physician practitioner and I was immediately available to provide assistance.       At this point I agree with the current assessment done in the Emergency Department.  I have conducted an independent evaluation of this patient a history and physical is as follows:    Subjective: 18-year-old female with history of depression presenting with intrusive thoughts without SI/HI or AVH.  She reports depressed mood but no acts of self-harm or plan or acts of furtherance towards a plan.    Objective: Vital signs stable and afebrile.  Depressed mood and dysthymic affect with no SI/HI or AVH.  Linear thoughts.  Judgment/Insight good.    Assessment/Plan: 18-year-old female with history of depression presenting with worsening of her mood.  She initially was agreeable with inpatient hospitalization but became frustrated with the wait and stated that she has outpatient follow-up and is willing to return immediately if she is in a mental health crisis.  She was found to be stable at time of discharge with no SI/HI or AVH.  She had Medical Decision Making capacity and was discharged to home with strict return precautions.

## 2024-10-31 ENCOUNTER — TELEMEDICINE (OUTPATIENT)
Dept: PSYCHIATRY | Facility: CLINIC | Age: 19
End: 2024-10-31
Payer: COMMERCIAL

## 2024-10-31 ENCOUNTER — DOCUMENTATION (OUTPATIENT)
Dept: PSYCHOLOGY | Facility: CLINIC | Age: 19
End: 2024-10-31

## 2024-10-31 ENCOUNTER — TELEPHONE (OUTPATIENT)
Dept: PSYCHIATRY | Facility: CLINIC | Age: 19
End: 2024-10-31

## 2024-10-31 DIAGNOSIS — F41.0 GENERALIZED ANXIETY DISORDER WITH PANIC ATTACKS: ICD-10-CM

## 2024-10-31 DIAGNOSIS — F41.1 GENERALIZED ANXIETY DISORDER WITH PANIC ATTACKS: ICD-10-CM

## 2024-10-31 DIAGNOSIS — F17.200 NICOTINE USE DISORDER: ICD-10-CM

## 2024-10-31 DIAGNOSIS — F33.2 MAJOR DEPRESSIVE DISORDER, RECURRENT SEVERE WITHOUT PSYCHOTIC FEATURES (HCC): Primary | ICD-10-CM

## 2024-10-31 PROCEDURE — 90792 PSYCH DIAG EVAL W/MED SRVCS: CPT | Performed by: STUDENT IN AN ORGANIZED HEALTH CARE EDUCATION/TRAINING PROGRAM

## 2024-10-31 RX ORDER — POLYETHYLENE GLYCOL 3350 17 G
2 POWDER IN PACKET (EA) ORAL AS NEEDED
Qty: 100 EACH | Refills: 0 | Status: SHIPPED | OUTPATIENT
Start: 2024-10-31 | End: 2024-10-31

## 2024-10-31 RX ORDER — MIRTAZAPINE 15 MG/1
15 TABLET, FILM COATED ORAL
Qty: 30 TABLET | Refills: 1 | Status: SHIPPED | OUTPATIENT
Start: 2024-10-31 | End: 2024-10-31

## 2024-10-31 NOTE — ASSESSMENT & PLAN NOTE
Order nicotine lozenges for patient, she states that she has poor dentition and cannot tolerate the gum, and does not want to try the patch.  Orders:    nicotine polacrilex (COMMIT) 2 MG lozenge; Apply 1 lozenge (2 mg total) to the mouth or throat as needed for smoking cessation

## 2024-10-31 NOTE — TELEPHONE ENCOUNTER
Called patient, she reported she feels she has no privacy for the appointments for PHP; would like to decline PHP at this time.  Discussed that I will not be able to prescribe medications for her, but that she can followup with Dr. Pérez for possibly trialing Mirtazapine; states she feels she is doing okay with Effexor for now, will just continue with this, and will call his office after hanging up with me to schedule a followup with him.  She denies any thoughts to harm herself or others, and is able to contract for safety.  States she is looking forward to individual counseling next week.
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
## 2024-10-31 NOTE — ASSESSMENT & PLAN NOTE
Mirtazapine and Effexor combination may help with her anxiety.  Encouraged to continue follow-up with these groups to help her work on her mental health.

## 2024-10-31 NOTE — PROGRESS NOTES
Bethanie Cronin called program stating she does not feel the program is the right fit for her and would not like to attend. Reported she will follow up with her outpatient provider.     Ashley Costenbader MA LMT

## 2024-10-31 NOTE — ASSESSMENT & PLAN NOTE
Will continue with Effexor XR 75 mg daily and augment with mirtazapine 15 mg daily at bedtime.  Orders:    mirtazapine (REMERON) 15 mg tablet; Take 1 tablet (15 mg total) by mouth daily at bedtime

## 2024-11-01 ENCOUNTER — TELEPHONE (OUTPATIENT)
Age: 19
End: 2024-11-01

## 2024-11-01 NOTE — TELEPHONE ENCOUNTER
Patient called discuss with PCP regarding recent lab results 10/25 with fasting glucose elevated-161; may not have been fasting for lab draw. RN review of past CMP fasting glucose 10/17, 9/23, 10/23/23 have all been normal results. Patient reports symptoms last night 10/31 of headache, lightheaded, vision and hearing fading, felt hot and thirsty. Feels unfocused today; not sure if related to concern for elevated results. Urinating less frequently, but hydrating.. Does not drink equivalent to 4-160 oz bottles per day. Diet includes a lot of sodas. RN advised patient to monitor PO intake, increase fluid intake with less soda. Advised if symptoms worsen over the weekend, to go to urgent care or ER for evaluation. Please follow up with patient for care advice and response from provider.

## 2024-11-03 NOTE — TELEPHONE ENCOUNTER
I will discuss at her upcoming appointment. That blood glucose -161-is not significantly elevated-it would occur after eating

## 2024-11-05 ENCOUNTER — OFFICE VISIT (OUTPATIENT)
Dept: BEHAVIORAL/MENTAL HEALTH CLINIC | Facility: CLINIC | Age: 19
End: 2024-11-05
Payer: COMMERCIAL

## 2024-11-05 DIAGNOSIS — F41.9 ANXIETY: Primary | ICD-10-CM

## 2024-11-05 DIAGNOSIS — F40.10 SOCIAL ANXIETY DISORDER: ICD-10-CM

## 2024-11-05 DIAGNOSIS — F41.0 GENERALIZED ANXIETY DISORDER WITH PANIC ATTACKS: ICD-10-CM

## 2024-11-05 DIAGNOSIS — F33.1 MAJOR DEPRESSIVE DISORDER, RECURRENT, MODERATE (HCC): ICD-10-CM

## 2024-11-05 DIAGNOSIS — F41.1 GENERALIZED ANXIETY DISORDER WITH PANIC ATTACKS: ICD-10-CM

## 2024-11-05 PROCEDURE — 90834 PSYTX W PT 45 MINUTES: CPT | Performed by: COUNSELOR

## 2024-11-05 NOTE — PSYCH
Outpatient Behavioral Health Psychotherapy Treatment Plan    Bethanie Cronin  2005     Date of Initial Psychotherapy Assessment: ***   Date of Current Treatment Plan: 11/05/24  Treatment Plan Target Date: ***  Treatment Plan Expiration Date: ***    Diagnosis:   No diagnosis found.    Area(s) of Need: ***    Long Term Goal 1 (in the client's own words): ***    Stage of Change: {SL AMB PSYCH STAGE OF CHANGE:65214}    Target Date for completion: ***     Anticipated therapeutic modalities: ***     People identified to complete this goal: ***      Objective 1: (identify the means of measuring success in meeting the objective): ***      Objective 2: (identify the means of measuring success in meeting the objective): ***      Long Term Goal 2 (in the client's own words): ***    Stage of Change: {SL AMB PSYCH STAGE OF CHANGE:66368}    Target Date for completion: ***     Anticipated therapeutic modalities: ***     People identified to complete this goal: ***      Objective 1: (identify the means of measuring success in meeting the objective): ***      Objective 2: (identify the means of measuring success in meeting the objective): ***     Long Term Goal 3 (in the client's own words): ***    Stage of Change: {SL AMB PSYCH STAGE OF CHANGE:10731}    Target Date for completion: ***     Anticipated therapeutic modalities: ***     People identified to complete this goal: ***      Objective 1: (identify the means of measuring success in meeting the objective): ***      Objective 2: (identify the means of measuring success in meeting the objective): ***     I am currently under the care of a [facility] psychiatric provider: {YES/NO:20200}    My [facility] psychiatric provider is: ***    I am currently taking psychiatric medications: {SL AMB TAKING PSYCH MEDS:53538}    I feel that I will be ready for discharge from mental health care when I reach the following (measurable goal/objective): ***    For children and adults who  have a legal guardian:   Has there been any change to custody orders and/or guardianship status? {Yes/No/NA:07228}. If yes, attach updated documentation.    I have { AMB PSYCH CREATED/UPDATED:77435} my Crisis Plan and have been offered a copy of this plan    Behavioral Health Treatment Plan [facility]: Diagnosis and Treatment Plan explained to Bethanie Cronin {acknowledge/does not acknowledge:20634} an understanding of their diagnosis. Bethanie Cronin { AMB PSYCH AGREE/DISAGREE:03896} this treatment plan.    I have been offered a copy of this Treatment Plan. {YES/NO:20200}

## 2024-11-05 NOTE — BH CRISIS PLAN
Client Name: Bethanie Cronin       Client YOB: 2005    [place] Safety Plan      Creation Date: 11/5/24 Update Date: 11/5/25   Created By: CHAPIN Goodwin Last Updated By: CHAPIN Goodwin      Step 1: Warning Signs:   Warning Signs   feeling unstable   outbursts            Step 2: Internal Coping Strategies:   Internal Coping Strategies   let it wear out            Step 3: People and social settings that provide distraction:   Name Contact Information   mom in cell   friends online     Places   bathroom           Step 4: People whom I can ask for help during a crisis:      Name Contact Information    mom     friends online       Step 5: Professionals or agencies I can contact during a crisis:      Clinican/Agency Name Phone Emergency Contact    Jen Goodwin, [facility]        Local Emergency Department Emergency Department Phone Emergency Department Address    [facility]          Crisis Phone Numbers:   Suicide Prevention Lifeline: Call or Text  202 Crisis Text Line: Text HOME to 521-752   Please note: Some [facility] do not have a separate number for Child/Adolescent specific crisis. If your county is not listed under Child/Adolescent, please call the adult number for your county      Adult Crisis Numbers: Child/Adolescent Crisis Numbers   [facility]: [phone] [facility]: [phone]   [facility]: [phone] [facility]: [phone]   [facility]: [phone] [place]: [phone]   [facility]: [phone] [facility]: [phone]   [facility]: [phone]   [facility]: [phone]   [facility]: [phone]   [facility]: [phone] (daytime) [phone] (after hours, weekends, holidays)      Step 6: Making the environment safer (plan for lethal means safety):   Patient did not identify any lethal methods: Yes     Optional: What is most important to me and worth living for?       [facility]astian Safety Plan. Mikayla Mcpherson and Nicola Kendall. Used with permission of the authors.

## 2024-11-06 ENCOUNTER — HOSPITAL ENCOUNTER (OUTPATIENT)
Dept: INFUSION CENTER | Facility: CLINIC | Age: 19
Discharge: HOME | End: 2024-11-06
Payer: COMMERCIAL

## 2024-11-06 DIAGNOSIS — E53.8 CYANOCOBALAMIN DEFICIENCY: Primary | ICD-10-CM

## 2024-11-06 DIAGNOSIS — D50.8 OTHER IRON DEFICIENCY ANEMIA: ICD-10-CM

## 2024-11-06 PROCEDURE — 96365 THER/PROPH/DIAG IV INF INIT: CPT

## 2024-11-06 PROCEDURE — 96372 THER/PROPH/DIAG INJ SC/IM: CPT

## 2024-11-06 RX ORDER — SODIUM CHLORIDE 9 MG/ML
20 INJECTION, SOLUTION INTRAVENOUS ONCE
Status: COMPLETED | OUTPATIENT
Start: 2024-11-06 | End: 2024-11-06

## 2024-11-06 RX ORDER — CYANOCOBALAMIN 1000 UG/ML
1000 INJECTION, SOLUTION INTRAMUSCULAR; SUBCUTANEOUS ONCE
Status: CANCELLED | OUTPATIENT
Start: 2024-11-12 | End: 2024-11-12

## 2024-11-06 RX ORDER — SODIUM CHLORIDE 9 MG/ML
20 INJECTION, SOLUTION INTRAVENOUS ONCE
Status: CANCELLED | OUTPATIENT
Start: 2024-11-12

## 2024-11-06 RX ORDER — CYANOCOBALAMIN 1000 UG/ML
1000 INJECTION, SOLUTION INTRAMUSCULAR; SUBCUTANEOUS ONCE
Status: COMPLETED | OUTPATIENT
Start: 2024-11-06 | End: 2024-11-06

## 2024-11-06 RX ADMIN — CYANOCOBALAMIN 1000 MCG: 1000 INJECTION, SOLUTION INTRAMUSCULAR at 16:24

## 2024-11-06 RX ADMIN — SODIUM CHLORIDE 20 ML/HR: 9 INJECTION, SOLUTION INTRAVENOUS at 16:15

## 2024-11-06 RX ADMIN — IRON SUCROSE 200 MG: 20 INJECTION, SOLUTION INTRAVENOUS at 16:15

## 2024-11-06 NOTE — PROGRESS NOTES
Patient to infusion for venofer.  She tolerated treatment today.  Next appointment confirmed for 11/12/24 1600, she declined AVS

## 2024-11-06 NOTE — PSYCH
"Behavioral Health Psychotherapy Progress Note    Psychotherapy Provided: Individual Psychotherapy     1. Anxiety        2. Social anxiety disorder        3. Generalized anxiety disorder with panic attacks        4. Major depressive disorder, recurrent, moderate (HCC)            Goals addressed in session: Goal 1     DATA: Met with Bethanie who was a transfer from another clinician who left the practice. The balance of session time was used to obtain a history and develop a therapeutic bond  During this session, this clinician used the following therapeutic modalities: Client-centered Therapy    Substance Abuse was not addressed during this session. If the client is diagnosed with a co-occurring substance use disorder, please indicate any changes in the frequency or amount of use:  Stage of change for addressing substance use diagnoses: No substance use/Not applicable    ASSESSMENT:  Bethanei Cronin presents with a Euthymic/ normal mood.     her affect is Normal range and intensity, which is congruent, with her mood and the content of the session. The client has made progress on their goals.     Bethanie Cronin presents with a none risk of suicide, none risk of self-harm, and none risk of harm to others.    For any risk assessment that surpasses a \"low\" rating, a safety plan must be developed.    A safety plan was indicated: no  If yes, describe in detail N/A    PLAN: Between sessions, Bethanie Cronin will log and panic attacks with regard to exploring triggers. At the next session, the therapist will use Client-centered Therapy to address self care.    Behavioral Health Treatment Plan and Discharge Planning: Bethanie Cronin is aware of and agrees to continue to work on their treatment plan. They have identified and are working toward their discharge goals. yes    Visit start and stop times:    11/06/24  Start Time: 1550  Stop Time: 1632  Total Visit Time: 42 minutes  "

## 2024-11-12 ENCOUNTER — HOSPITAL ENCOUNTER (OUTPATIENT)
Dept: INFUSION CENTER | Facility: CLINIC | Age: 19
Discharge: HOME | End: 2024-11-12
Payer: COMMERCIAL

## 2024-11-12 DIAGNOSIS — D50.8 OTHER IRON DEFICIENCY ANEMIA: ICD-10-CM

## 2024-11-12 DIAGNOSIS — E53.8 CYANOCOBALAMIN DEFICIENCY: Primary | ICD-10-CM

## 2024-11-12 PROCEDURE — 96365 THER/PROPH/DIAG IV INF INIT: CPT

## 2024-11-12 PROCEDURE — 96372 THER/PROPH/DIAG INJ SC/IM: CPT

## 2024-11-12 RX ORDER — CYANOCOBALAMIN 1000 UG/ML
1000 INJECTION, SOLUTION INTRAMUSCULAR; SUBCUTANEOUS ONCE
Status: CANCELLED | OUTPATIENT
Start: 2024-11-19 | End: 2024-11-13

## 2024-11-12 RX ORDER — CYANOCOBALAMIN 1000 UG/ML
1000 INJECTION, SOLUTION INTRAMUSCULAR; SUBCUTANEOUS ONCE
Status: COMPLETED | OUTPATIENT
Start: 2024-11-12 | End: 2024-11-12

## 2024-11-12 RX ORDER — SODIUM CHLORIDE 9 MG/ML
20 INJECTION, SOLUTION INTRAVENOUS ONCE
Status: COMPLETED | OUTPATIENT
Start: 2024-11-12 | End: 2024-11-12

## 2024-11-12 RX ORDER — SODIUM CHLORIDE 9 MG/ML
20 INJECTION, SOLUTION INTRAVENOUS ONCE
Status: CANCELLED | OUTPATIENT
Start: 2024-11-19

## 2024-11-12 RX ADMIN — IRON SUCROSE 200 MG: 20 INJECTION, SOLUTION INTRAVENOUS at 16:31

## 2024-11-12 RX ADMIN — CYANOCOBALAMIN 1000 MCG: 1000 INJECTION, SOLUTION INTRAMUSCULAR at 16:31

## 2024-11-12 RX ADMIN — SODIUM CHLORIDE 20 ML/HR: 9 INJECTION, SOLUTION INTRAVENOUS at 16:31

## 2024-11-12 NOTE — PROGRESS NOTES
Patient tolerated Venofer without issue today. PIV removed intact by Pallavi CACERES RN, coban wrap in place. Patient confirms next appt on 11/19 at 4PM at [facility] infusion, declines AVS.

## 2024-11-19 ENCOUNTER — HOSPITAL ENCOUNTER (OUTPATIENT)
Dept: INFUSION CENTER | Facility: CLINIC | Age: 19
Discharge: HOME | End: 2024-11-19
Payer: COMMERCIAL

## 2024-11-19 VITALS
SYSTOLIC BLOOD PRESSURE: 119 MMHG | OXYGEN SATURATION: 99 % | RESPIRATION RATE: 18 BRPM | DIASTOLIC BLOOD PRESSURE: 82 MMHG | HEART RATE: 80 BPM | TEMPERATURE: 97.7 F

## 2024-11-19 DIAGNOSIS — F33.1 MAJOR DEPRESSIVE DISORDER, RECURRENT, MODERATE (HCC): ICD-10-CM

## 2024-11-19 DIAGNOSIS — F41.1 GENERALIZED ANXIETY DISORDER WITH PANIC ATTACKS: ICD-10-CM

## 2024-11-19 DIAGNOSIS — F41.0 GENERALIZED ANXIETY DISORDER WITH PANIC ATTACKS: ICD-10-CM

## 2024-11-19 DIAGNOSIS — D50.8 OTHER IRON DEFICIENCY ANEMIA: ICD-10-CM

## 2024-11-19 DIAGNOSIS — E53.8 CYANOCOBALAMIN DEFICIENCY: Primary | ICD-10-CM

## 2024-11-19 PROCEDURE — 96372 THER/PROPH/DIAG INJ SC/IM: CPT

## 2024-11-19 PROCEDURE — 96365 THER/PROPH/DIAG IV INF INIT: CPT

## 2024-11-19 RX ORDER — VENLAFAXINE HYDROCHLORIDE 75 MG/1
75 CAPSULE, EXTENDED RELEASE ORAL DAILY
Qty: 30 CAPSULE | Refills: 0 | Status: SHIPPED | OUTPATIENT
Start: 2024-11-19

## 2024-11-19 RX ORDER — SODIUM CHLORIDE 9 MG/ML
20 INJECTION, SOLUTION INTRAVENOUS ONCE
OUTPATIENT
Start: 2024-11-26

## 2024-11-19 RX ORDER — CYANOCOBALAMIN 1000 UG/ML
1000 INJECTION, SOLUTION INTRAMUSCULAR; SUBCUTANEOUS ONCE
Status: COMPLETED | OUTPATIENT
Start: 2024-11-19 | End: 2024-11-19

## 2024-11-19 RX ORDER — CYANOCOBALAMIN 1000 UG/ML
1000 INJECTION, SOLUTION INTRAMUSCULAR; SUBCUTANEOUS ONCE
OUTPATIENT
Start: 2024-11-26 | End: 2024-11-26

## 2024-11-19 RX ORDER — SODIUM CHLORIDE 9 MG/ML
20 INJECTION, SOLUTION INTRAVENOUS ONCE
Status: COMPLETED | OUTPATIENT
Start: 2024-11-19 | End: 2024-11-19

## 2024-11-19 RX ADMIN — CYANOCOBALAMIN 1000 MCG: 1000 INJECTION, SOLUTION INTRAMUSCULAR at 16:18

## 2024-11-19 RX ADMIN — SODIUM CHLORIDE 20 ML/HR: 0.9 INJECTION, SOLUTION INTRAVENOUS at 16:17

## 2024-11-19 RX ADMIN — IRON SUCROSE 200 MG: 20 INJECTION, SOLUTION INTRAVENOUS at 16:18

## 2024-11-19 NOTE — PROGRESS NOTES
Patient here for venofer, offers no complaints. Tolerated infusion without incident. Next appointment confirmed for 11/26 at 4pm, declined AVS.

## 2024-11-20 ENCOUNTER — OFFICE VISIT (OUTPATIENT)
Dept: FAMILY MEDICINE CLINIC | Facility: CLINIC | Age: 19
End: 2024-11-20
Payer: COMMERCIAL

## 2024-11-20 VITALS
BODY MASS INDEX: 26.81 KG/M2 | OXYGEN SATURATION: 96 % | TEMPERATURE: 97.9 F | WEIGHT: 142 LBS | HEIGHT: 61 IN | DIASTOLIC BLOOD PRESSURE: 76 MMHG | SYSTOLIC BLOOD PRESSURE: 118 MMHG | RESPIRATION RATE: 18 BRPM | HEART RATE: 86 BPM

## 2024-11-20 DIAGNOSIS — Z00.00 ANNUAL PHYSICAL EXAM: Primary | ICD-10-CM

## 2024-11-20 DIAGNOSIS — D50.8 OTHER IRON DEFICIENCY ANEMIA: ICD-10-CM

## 2024-11-20 DIAGNOSIS — R14.0 BLOATING: ICD-10-CM

## 2024-11-20 PROCEDURE — 99395 PREV VISIT EST AGE 18-39: CPT | Performed by: NURSE PRACTITIONER

## 2024-11-20 RX ORDER — FAMOTIDINE 40 MG/1
40 TABLET, FILM COATED ORAL DAILY
COMMUNITY

## 2024-11-21 NOTE — PROGRESS NOTES
Adult Annual Physical  Name: Bethanie Cronin      : 2005      MRN: [id number]  Encounter Provider: MANNY Johnson  Encounter Date: 2024   Encounter department: [facility] PRIMARY [facility]    Assessment & Plan  Annual physical exam         Bloating  Workup in progress rule out celiac  Consider SIBO eval       Other iron deficiency anemia  Slow improvement with iron infusions         Immunizations and preventive care screenings were discussed with patient today. Appropriate education was printed on patient's after visit summary.    Counseling:  Dental Health: discussed importance of regular tooth brushing, flossing, and dental visits.  Injury prevention: discussed safety/seat belts, safety helmets, smoke detectors, carbon monoxide detectors, and smoking near bedding or upholstery.  Exercise: the importance of regular exercise/physical activity was discussed. Recommend exercise 3-5 times per week for at least 30 minutes.          History of Present Illness     Adult Annual Physical:  Patient presents for annual physical.     Diet and Physical Activity:  - Diet/Nutrition: limited junk food. Actively trying to improve diet  - Exercise: no formal exercise.    General Health:  - Sleep: 7-8 hours of sleep on average.  - Hearing: normal hearing bilateral ears.  - Vision: no vision problems.  - Dental: no dental visits for > 1 year.    /GYN Health:  - Follows with GYN: no.   - Menopause: perimenopausal.   - Contraception:. Normal periods      Review of Systems   Constitutional:  Positive for fatigue. Negative for fever.   HENT:  Negative for trouble swallowing.    Respiratory:  Negative for cough and shortness of breath.    Cardiovascular: Negative.    Gastrointestinal:  Positive for abdominal pain and nausea. Negative for blood in stool.        Still having issues with gassiness and bloating  Dimethicone not helping  Seen gastroenterology next month   Genitourinary:  Negative for dysuria.  "  Musculoskeletal:  Negative for arthralgias.   Skin:  Negative for rash and wound.   Neurological:  Negative for dizziness, weakness and headaches.   Hematological:  Does not bruise/bleed easily.     Medical History Reviewed by provider this encounter:  Tobacco  Allergies  Meds  Problems  Med Hx  Surg Hx  Fam Hx     .  Current Outpatient Medications on File Prior to Visit   Medication Sig Dispense Refill    famotidine (PEPCID) 40 MG tablet Take 40 mg by mouth daily      venlafaxine (EFFEXOR-XR) 75 mg 24 hr capsule TAKE 1 CAPSULE BY MOUTH EVERY DAY 30 capsule 0    [DISCONTINUED] pantoprazole (PROTONIX) 40 mg tablet Take 1 tablet (40 mg total) by mouth daily (Patient not taking: Reported on 11/20/2024) 30 tablet 11    [DISCONTINUED] SUMAtriptan (IMITREX) 100 mg tablet PLEASE SEE ATTACHED FOR DETAILED DIRECTIONS (Patient not taking: Reported on 11/20/2024) 9 tablet 0     No current facility-administered medications on file prior to visit.        Objective   /76 (BP Location: Left arm, Patient Position: Sitting, Cuff Size: Large)   Pulse 86   Temp 97.9 °F (36.6 °C) (Temporal)   Resp 18   Ht 5' 1\" (1.549 m)   Wt 64.4 kg (142 lb)   SpO2 96%   BMI 26.83 kg/m²     Physical Exam  Vitals and nursing note reviewed.   Constitutional:       General: She is not in acute distress.     Appearance: Normal appearance.   Eyes:      Pupils: Pupils are equal, round, and reactive to light.   Cardiovascular:      Rate and Rhythm: Normal rate and regular rhythm.      Pulses: Normal pulses.   Pulmonary:      Effort: Pulmonary effort is normal.      Breath sounds: Normal breath sounds.   Abdominal:      General: Bowel sounds are normal.      Palpations: Abdomen is soft.      Tenderness: There is no abdominal tenderness.   Musculoskeletal:      Right lower leg: No edema.      Left lower leg: No edema.   Skin:     General: Skin is warm and dry.      Coloration: Skin is pale.   Neurological:      General: No focal deficit " present.      Mental Status: She is alert. Mental status is at baseline.   Psychiatric:         Mood and Affect: Mood normal.

## 2024-11-21 NOTE — PATIENT INSTRUCTIONS
"Patient Education     Routine physical for adults   The Basics   Written by the doctors and editors at [facility]   What is a physical? -- A physical is a routine visit, or \"check-up,\" with your doctor. You might also hear it called a \"wellness visit\" or \"preventive visit.\"  During each visit, the doctor will:   Ask about your physical and mental health   Ask about your habits, behaviors, and lifestyle   Do an exam   Give you vaccines if needed   Talk to you about any medicines you take   Give advice about your health   Answer your questions  Getting regular check-ups is an important part of taking care of your health. It can help your doctor find and treat any problems you have. But it's also important for preventing health problems.  A routine physical is different from a \"sick visit.\" A sick visit is when you see a doctor because of a health concern or problem. Since physicals are scheduled ahead of time, you can think about what you want to ask the doctor.  How often should I get a physical? -- It depends on your age and health. In general, for people age 21 years and older:   If you are younger than 50 years, you might be able to get a physical every 3 years.   If you are 50 years or older, your doctor might recommend a physical every year.  If you have an ongoing health condition, like diabetes or high blood pressure, your doctor will probably want to see you more often.  What happens during a physical? -- In general, each visit will include:   Physical exam - The doctor or nurse will check your height, weight, heart rate, and blood pressure. They will also look at your eyes and ears. They will ask about how you are feeling and whether you have any symptoms that bother you.   Medicines - It's a good idea to bring a list of all the medicines you take to each doctor visit. Your doctor will talk to you about your medicines and answer any questions. Tell them if you are having any side effects that bother you. You " "should also tell them if you are having trouble paying for any of your medicines.   Habits and behaviors - This includes:   Your diet   Your exercise habits   Whether you smoke, drink alcohol, or use drugs   Whether you are sexually active   Whether you feel safe at home  Your doctor will talk to you about things you can do to improve your health and lower your risk of health problems. They will also offer help and support. For example, if you want to quit smoking, they can give you advice and might prescribe medicines. If you want to improve your diet or get more physical activity, they can help you with this, too.   Lab tests, if needed - The tests you get will depend on your age and situation. For example, your doctor might want to check your:   Cholesterol   Blood sugar   Iron level   Vaccines - The recommended vaccines will depend on your age, health, and what vaccines you already had. Vaccines are very important because they can prevent certain serious or deadly infections.   Discussion of screening - \"Screening\" means checking for diseases or other health problems before they cause symptoms. Your doctor can recommend screening based on your age, risk, and preferences. This might include tests to check for:   Cancer, such as breast, prostate, cervical, ovarian, colorectal, prostate, lung, or skin cancer   Sexually transmitted infections, such as chlamydia and gonorrhea   Mental health conditions like depression and anxiety  Your doctor will talk to you about the different types of screening tests. They can help you decide which screenings to have. They can also explain what the results might mean.   Answering questions - The physical is a good time to ask the doctor or nurse questions about your health. If needed, they can refer you to other doctors or specialists, too.  Adults older than 65 years often need other care, too. As you get older, your doctor will talk to you about:   How to prevent falling at " home   Hearing or vision tests   Memory testing   How to take your medicines safely   Making sure that you have the help and support you need at home  All topics are updated as new evidence becomes available and our peer review process is complete.  This topic retrieved from [employer] on: May 02, 2024.  Topic 536214 Version 1.0  Release: 32.4.3 - C32.122  © 2024 UpToDate, Inc. and/or its affiliates. All rights reserved.  Consumer Information Use and Disclaimer   Disclaimer: This generalized information is a limited summary of diagnosis, treatment, and/or medication information. It is not meant to be comprehensive and should be used as a tool to help the user understand and/or assess potential diagnostic and treatment options. It does NOT include all information about conditions, treatments, medications, side effects, or risks that may apply to a specific patient. It is not intended to be medical advice or a substitute for the medical advice, diagnosis, or treatment of a health care provider based on the health care provider's examination and assessment of a patient's specific and unique circumstances. Patients must speak with a health care provider for complete information about their health, medical questions, and treatment options, including any risks or benefits regarding use of medications. This information does not endorse any treatments or medications as safe, effective, or approved for treating a specific patient. UpToDate, Inc. and its affiliates disclaim any warranty or liability relating to this information or the use thereof.The use of this information is governed by the Terms of Use, available at https://www.w[employer]tersFanDistrouwer.com/en/know/clinical-effectiveness-terms. 2024© UpToDate, Inc. and its affiliates and/or licensors. All rights reserved.  Copyright   © 2024 UpToDate, Inc. and/or its affiliates. All rights reserved.

## 2024-11-23 ENCOUNTER — HOSPITAL ENCOUNTER (OUTPATIENT)
Dept: RADIOLOGY | Facility: HOSPITAL | Age: 19
Discharge: HOME | End: 2024-11-23
Payer: COMMERCIAL

## 2024-11-23 ENCOUNTER — APPOINTMENT (OUTPATIENT)
Dept: LAB | Facility: HOSPITAL | Age: 19
End: 2024-11-23
Payer: COMMERCIAL

## 2024-11-23 DIAGNOSIS — K59.09 OTHER CONSTIPATION: ICD-10-CM

## 2024-11-23 DIAGNOSIS — R14.0 BLOATING: ICD-10-CM

## 2024-11-23 PROCEDURE — 86364 TISS TRNSGLTMNASE EA IG CLAS: CPT

## 2024-11-23 PROCEDURE — 74018 RADEX ABDOMEN 1 VIEW: CPT

## 2024-11-23 PROCEDURE — 86258 DGP ANTIBODY EACH IG CLASS: CPT

## 2024-11-23 PROCEDURE — 82784 ASSAY IGA/IGD/IGG/IGM EACH: CPT

## 2024-11-23 PROCEDURE — 36415 COLL VENOUS BLD VENIPUNCTURE: CPT

## 2024-11-23 PROCEDURE — 86231 EMA EACH IG CLASS: CPT

## 2024-11-25 LAB
ENDOMYSIUM IGA SER QL: NEGATIVE
GLIADIN PEPTIDE IGA SER-ACNC: 2 UNITS (ref 0–19)
GLIADIN PEPTIDE IGG SER-ACNC: 4 UNITS (ref 0–19)
IGA SERPL-MCNC: 142 MG/DL (ref 87–352)
TTG IGA SER-ACNC: <2 U/ML (ref 0–3)
TTG IGG SER-ACNC: <2 U/ML (ref 0–5)

## 2024-11-26 ENCOUNTER — HOSPITAL ENCOUNTER (OUTPATIENT)
Dept: INFUSION CENTER | Facility: CLINIC | Age: 19
Discharge: HOME | End: 2024-11-26
Payer: COMMERCIAL

## 2024-11-26 VITALS
TEMPERATURE: 98.2 F | OXYGEN SATURATION: 99 % | HEART RATE: 83 BPM | DIASTOLIC BLOOD PRESSURE: 71 MMHG | SYSTOLIC BLOOD PRESSURE: 101 MMHG | RESPIRATION RATE: 19 BRPM

## 2024-11-26 DIAGNOSIS — D50.8 OTHER IRON DEFICIENCY ANEMIA: ICD-10-CM

## 2024-11-26 DIAGNOSIS — E53.8 CYANOCOBALAMIN DEFICIENCY: Primary | ICD-10-CM

## 2024-11-26 PROCEDURE — 96372 THER/PROPH/DIAG INJ SC/IM: CPT

## 2024-11-26 PROCEDURE — 96365 THER/PROPH/DIAG IV INF INIT: CPT

## 2024-11-26 RX ORDER — CYANOCOBALAMIN 1000 UG/ML
1000 INJECTION, SOLUTION INTRAMUSCULAR; SUBCUTANEOUS ONCE
Status: COMPLETED | OUTPATIENT
Start: 2024-11-26 | End: 2024-11-26

## 2024-11-26 RX ORDER — CYANOCOBALAMIN 1000 UG/ML
1000 INJECTION, SOLUTION INTRAMUSCULAR; SUBCUTANEOUS ONCE
Status: CANCELLED | OUTPATIENT
Start: 2024-12-03 | End: 2024-12-03

## 2024-11-26 RX ORDER — SODIUM CHLORIDE 9 MG/ML
20 INJECTION, SOLUTION INTRAVENOUS ONCE
Status: CANCELLED | OUTPATIENT
Start: 2024-12-03

## 2024-11-26 RX ORDER — SODIUM CHLORIDE 9 MG/ML
20 INJECTION, SOLUTION INTRAVENOUS ONCE
Status: COMPLETED | OUTPATIENT
Start: 2024-11-26 | End: 2024-11-26

## 2024-11-26 RX ADMIN — SODIUM CHLORIDE 20 ML/HR: 9 INJECTION, SOLUTION INTRAVENOUS at 16:22

## 2024-11-26 RX ADMIN — IRON SUCROSE 200 MG: 20 INJECTION, SOLUTION INTRAVENOUS at 16:19

## 2024-11-26 RX ADMIN — CYANOCOBALAMIN 1000 MCG: 1000 INJECTION, SOLUTION INTRAMUSCULAR at 16:20

## 2024-11-26 NOTE — PROGRESS NOTES
Pt tolerated tx well with no complaints at this time. Next appt 12/3 at 1600 at [facility]. Declined AVS.

## 2024-11-26 NOTE — PROGRESS NOTES
Patient to infusion center for venofer infusion and vitamin b12 injection. Patient offers no complaints. VSS. Peripheral IV inserted without incident.

## 2024-11-29 ENCOUNTER — RESULTS FOLLOW-UP (OUTPATIENT)
Dept: OTHER | Facility: HOSPITAL | Age: 19
End: 2024-11-29

## 2024-12-02 ENCOUNTER — TELEPHONE (OUTPATIENT)
Age: 19
End: 2024-12-02

## 2024-12-02 NOTE — TELEPHONE ENCOUNTER
Patient called in to schedule their follow up with their medication management provider.    Patient is now scheduled on /7/24 @4

## 2024-12-03 ENCOUNTER — TELEPHONE (OUTPATIENT)
Dept: INFUSION CENTER | Facility: CLINIC | Age: 19
End: 2024-12-03

## 2024-12-03 NOTE — TELEPHONE ENCOUNTER
Pt called to let [facility] know that she is unable to make today's appt. Offered Thursday and Friday, pt needed after 430. I let pt know that based on her tx, 4pm is the latest time we can schedule her for. She will call back to reschedule.

## 2024-12-04 ENCOUNTER — SOCIAL WORK (OUTPATIENT)
Dept: BEHAVIORAL/MENTAL HEALTH CLINIC | Facility: CLINIC | Age: 19
End: 2024-12-04
Payer: COMMERCIAL

## 2024-12-04 DIAGNOSIS — F41.9 ANXIETY: Primary | ICD-10-CM

## 2024-12-04 DIAGNOSIS — F31.81 BIPOLAR II DISORDER (HCC): ICD-10-CM

## 2024-12-04 PROCEDURE — 90837 PSYTX W PT 60 MINUTES: CPT | Performed by: COUNSELOR

## 2024-12-10 ENCOUNTER — TELEPHONE (OUTPATIENT)
Age: 19
End: 2024-12-10

## 2024-12-10 ENCOUNTER — TELEPHONE (OUTPATIENT)
Dept: FAMILY MEDICINE CLINIC | Facility: CLINIC | Age: 19
End: 2024-12-10

## 2024-12-10 ENCOUNTER — HOSPITAL ENCOUNTER (OUTPATIENT)
Dept: INFUSION CENTER | Facility: CLINIC | Age: 19
Discharge: HOME | End: 2024-12-10
Payer: COMMERCIAL

## 2024-12-10 VITALS
OXYGEN SATURATION: 99 % | SYSTOLIC BLOOD PRESSURE: 122 MMHG | HEART RATE: 81 BPM | TEMPERATURE: 97.2 F | RESPIRATION RATE: 18 BRPM | DIASTOLIC BLOOD PRESSURE: 84 MMHG

## 2024-12-10 DIAGNOSIS — E53.8 CYANOCOBALAMIN DEFICIENCY: Primary | ICD-10-CM

## 2024-12-10 DIAGNOSIS — D50.8 OTHER IRON DEFICIENCY ANEMIA: ICD-10-CM

## 2024-12-10 PROCEDURE — 96365 THER/PROPH/DIAG IV INF INIT: CPT

## 2024-12-10 PROCEDURE — 96372 THER/PROPH/DIAG INJ SC/IM: CPT

## 2024-12-10 RX ORDER — CYANOCOBALAMIN 1000 UG/ML
1000 INJECTION, SOLUTION INTRAMUSCULAR; SUBCUTANEOUS ONCE
Status: CANCELLED | OUTPATIENT
Start: 2024-12-10 | End: 2024-12-10

## 2024-12-10 RX ORDER — SODIUM CHLORIDE 9 MG/ML
20 INJECTION, SOLUTION INTRAVENOUS ONCE
Status: CANCELLED | OUTPATIENT
Start: 2024-12-17

## 2024-12-10 RX ORDER — SODIUM CHLORIDE 9 MG/ML
20 INJECTION, SOLUTION INTRAVENOUS ONCE
Status: CANCELLED | OUTPATIENT
Start: 2024-12-10

## 2024-12-10 RX ORDER — SODIUM CHLORIDE 9 MG/ML
20 INJECTION, SOLUTION INTRAVENOUS ONCE
Status: COMPLETED | OUTPATIENT
Start: 2024-12-10 | End: 2024-12-10

## 2024-12-10 RX ORDER — CYANOCOBALAMIN 1000 UG/ML
1000 INJECTION, SOLUTION INTRAMUSCULAR; SUBCUTANEOUS ONCE
Status: COMPLETED | OUTPATIENT
Start: 2024-12-10 | End: 2024-12-10

## 2024-12-10 RX ORDER — CYANOCOBALAMIN 1000 UG/ML
1000 INJECTION, SOLUTION INTRAMUSCULAR; SUBCUTANEOUS ONCE
Status: CANCELLED | OUTPATIENT
Start: 2024-12-17 | End: 2024-12-17

## 2024-12-10 RX ADMIN — CYANOCOBALAMIN 1000 MCG: 1000 INJECTION INTRAMUSCULAR; SUBCUTANEOUS at 16:16

## 2024-12-10 RX ADMIN — IRON SUCROSE 200 MG: 20 INJECTION, SOLUTION INTRAVENOUS at 16:16

## 2024-12-10 RX ADMIN — SODIUM CHLORIDE 20 ML/HR: 9 INJECTION, SOLUTION INTRAVENOUS at 16:16

## 2024-12-10 NOTE — TELEPHONE ENCOUNTER
Bethanie Cronin and/or patient requested a call back to discuss patient stated she would like to know if she could get an increase on Effexor dosage. Patient stated she is not feeling well lately and feels she is spiraling. Patient would like a call back.     They can be reached at P# [phone].       Thank you.

## 2024-12-10 NOTE — PROGRESS NOTES
Pt arrives to Infusion for Venofer and B12. Offers no complaints. PIV accessed without issue. Pt sitting comfortably in chair, wheels locked, call bell within reach.

## 2024-12-10 NOTE — PROGRESS NOTES
Patient tolerated treatment without incident. Next appt confirmed with patient for 12/17 at 4:00 at [place]. AVS declined.

## 2024-12-11 DIAGNOSIS — F33.2 MAJOR DEPRESSIVE DISORDER, RECURRENT SEVERE WITHOUT PSYCHOTIC FEATURES (HCC): Primary | ICD-10-CM

## 2024-12-11 DIAGNOSIS — F41.0 GENERALIZED ANXIETY DISORDER WITH PANIC ATTACKS: ICD-10-CM

## 2024-12-11 DIAGNOSIS — F41.1 GENERALIZED ANXIETY DISORDER WITH PANIC ATTACKS: ICD-10-CM

## 2024-12-11 RX ORDER — VENLAFAXINE HYDROCHLORIDE 37.5 MG/1
37.5 CAPSULE, EXTENDED RELEASE ORAL DAILY
Qty: 30 CAPSULE | Refills: 1 | Status: SHIPPED | OUTPATIENT
Start: 2024-12-11

## 2024-12-11 NOTE — PSYCH
"Behavioral Health Psychotherapy Progress Note    Psychotherapy Provided: Individual Psychotherapy     1. Anxiety        2. Bipolar II disorder (HCC)            Goals addressed in session: Goal 1     DATA: Met with Bethanie and discussed her becoming more comfortable in session along with her feeling of depression at times and how she deals with them on a healthy basis compared to prior incidents  During this session, this clinician used the following therapeutic modalities: Client-centered Therapy    Substance Abuse was not addressed during this session. If the client is diagnosed with a co-occurring substance use disorder, please indicate any changes in the frequency or amount of use: Stage of change for addressing substance use diagnoses: No substance use/Not applicable    ASSESSMENT:  Bethanie Cronin presents with a Euthymic/ normal mood.     her affect is Normal range and intensity, which is congruent, with her mood and the content of the session. The client has made progress on their goals.     Bethanie Cronin presents with a none risk of suicide, none risk of self-harm, and none risk of harm to others.    For any risk assessment that surpasses a \"low\" rating, a safety plan must be developed.    A safety plan was indicated: no  If yes, describe in detail N/a    PLAN: Between sessions, Bethanie Cronin will cont to self monitor and identify and explore triggers. At the next session, the therapist will use Client-centered Therapy to address self care.    Behavioral Health Treatment Plan and Discharge Planning: Bethanie Cronin is aware of and agrees to continue to work on their treatment plan. They have identified and are working toward their discharge goals. yes    Depression Follow-up Plan Completed: No    Visit start and stop times:    12/11/24  Start Time: 1600  Stop Time: 1654  Total Visit Time: 54 minutes  "

## 2024-12-11 NOTE — TELEPHONE ENCOUNTER
Called Bethanie to gather more information. She was a little vague, but said she has been feeling more tired and struggling with negative thoughts and a negative mindset. She denied suicidal ideation, and would like to move forward with the increase.

## 2024-12-17 ENCOUNTER — HOSPITAL ENCOUNTER (OUTPATIENT)
Dept: INFUSION CENTER | Facility: CLINIC | Age: 19
Discharge: HOME | End: 2024-12-17
Payer: COMMERCIAL

## 2024-12-17 VITALS
RESPIRATION RATE: 18 BRPM | TEMPERATURE: 97.5 F | HEART RATE: 86 BPM | DIASTOLIC BLOOD PRESSURE: 78 MMHG | OXYGEN SATURATION: 98 % | SYSTOLIC BLOOD PRESSURE: 105 MMHG

## 2024-12-17 DIAGNOSIS — D50.8 OTHER IRON DEFICIENCY ANEMIA: ICD-10-CM

## 2024-12-17 DIAGNOSIS — E53.8 CYANOCOBALAMIN DEFICIENCY: Primary | ICD-10-CM

## 2024-12-17 PROCEDURE — 96365 THER/PROPH/DIAG IV INF INIT: CPT

## 2024-12-17 PROCEDURE — 96372 THER/PROPH/DIAG INJ SC/IM: CPT

## 2024-12-17 RX ORDER — CYANOCOBALAMIN 1000 UG/ML
1000 INJECTION, SOLUTION INTRAMUSCULAR; SUBCUTANEOUS ONCE
Status: CANCELLED | OUTPATIENT
Start: 2024-12-26 | End: 2024-12-24

## 2024-12-17 RX ORDER — CYANOCOBALAMIN 1000 UG/ML
1000 INJECTION, SOLUTION INTRAMUSCULAR; SUBCUTANEOUS ONCE
Status: COMPLETED | OUTPATIENT
Start: 2024-12-17 | End: 2024-12-17

## 2024-12-17 RX ORDER — SODIUM CHLORIDE 9 MG/ML
20 INJECTION, SOLUTION INTRAVENOUS ONCE
Status: CANCELLED | OUTPATIENT
Start: 2024-12-26

## 2024-12-17 RX ORDER — SODIUM CHLORIDE 9 MG/ML
20 INJECTION, SOLUTION INTRAVENOUS ONCE
Status: COMPLETED | OUTPATIENT
Start: 2024-12-17 | End: 2024-12-17

## 2024-12-17 RX ADMIN — IRON SUCROSE 200 MG: 20 INJECTION, SOLUTION INTRAVENOUS at 16:10

## 2024-12-17 RX ADMIN — CYANOCOBALAMIN 1000 MCG: 1000 INJECTION INTRAMUSCULAR; SUBCUTANEOUS at 16:50

## 2024-12-17 RX ADMIN — SODIUM CHLORIDE 20 ML/HR: 0.9 INJECTION, SOLUTION INTRAVENOUS at 16:05

## 2024-12-17 NOTE — PROGRESS NOTES
Tolerated infusion without incident: No adverse reactions noted: Vitamin B12 per MD order: Injection given in Right UA: Verified follow up appt with patient ( 12/26/24 ): AVS offered and declined

## 2024-12-26 ENCOUNTER — HOSPITAL ENCOUNTER (OUTPATIENT)
Dept: INFUSION CENTER | Facility: CLINIC | Age: 19
Discharge: HOME | End: 2024-12-26
Payer: COMMERCIAL

## 2024-12-26 VITALS
HEART RATE: 90 BPM | RESPIRATION RATE: 18 BRPM | TEMPERATURE: 98.6 F | DIASTOLIC BLOOD PRESSURE: 87 MMHG | SYSTOLIC BLOOD PRESSURE: 118 MMHG | OXYGEN SATURATION: 98 %

## 2024-12-26 DIAGNOSIS — E53.8 CYANOCOBALAMIN DEFICIENCY: Primary | ICD-10-CM

## 2024-12-26 DIAGNOSIS — D50.8 OTHER IRON DEFICIENCY ANEMIA: ICD-10-CM

## 2024-12-26 PROCEDURE — 96365 THER/PROPH/DIAG IV INF INIT: CPT

## 2024-12-26 PROCEDURE — 96372 THER/PROPH/DIAG INJ SC/IM: CPT

## 2024-12-26 RX ORDER — CYANOCOBALAMIN 1000 UG/ML
1000 INJECTION, SOLUTION INTRAMUSCULAR; SUBCUTANEOUS ONCE
Status: CANCELLED | OUTPATIENT
Start: 2024-12-31 | End: 2024-12-31

## 2024-12-26 RX ORDER — SODIUM CHLORIDE 9 MG/ML
20 INJECTION, SOLUTION INTRAVENOUS ONCE
Status: COMPLETED | OUTPATIENT
Start: 2024-12-26 | End: 2024-12-26

## 2024-12-26 RX ORDER — SODIUM CHLORIDE 9 MG/ML
20 INJECTION, SOLUTION INTRAVENOUS ONCE
Status: CANCELLED | OUTPATIENT
Start: 2024-12-31

## 2024-12-26 RX ORDER — CYANOCOBALAMIN 1000 UG/ML
1000 INJECTION, SOLUTION INTRAMUSCULAR; SUBCUTANEOUS ONCE
Status: COMPLETED | OUTPATIENT
Start: 2024-12-26 | End: 2024-12-26

## 2024-12-26 RX ADMIN — CYANOCOBALAMIN 1000 MCG: 1000 INJECTION, SOLUTION INTRAMUSCULAR at 16:19

## 2024-12-26 RX ADMIN — IRON SUCROSE 200 MG: 20 INJECTION, SOLUTION INTRAVENOUS at 16:19

## 2024-12-26 RX ADMIN — SODIUM CHLORIDE 20 ML/HR: 9 INJECTION, SOLUTION INTRAVENOUS at 16:17

## 2024-12-26 NOTE — PROGRESS NOTES
Patient tolerated her treatment without any adverse reactions. Today was her last ordered dose. Patient does not have any follow up appointments with the Dr who ordered the venofer. Patient and mother were both informed to call office and see if she will need one and they verbalized understanding. Patient declined avs

## 2024-12-30 ENCOUNTER — SOCIAL WORK (OUTPATIENT)
Dept: BEHAVIORAL/MENTAL HEALTH CLINIC | Facility: CLINIC | Age: 19
End: 2024-12-30
Payer: COMMERCIAL

## 2024-12-30 DIAGNOSIS — F41.9 ANXIETY: Primary | ICD-10-CM

## 2024-12-30 DIAGNOSIS — R45.89 DEPRESSED AFFECT: ICD-10-CM

## 2024-12-30 PROCEDURE — 90834 PSYTX W PT 45 MINUTES: CPT | Performed by: COUNSELOR

## 2024-12-31 NOTE — PSYCH
"Behavioral Health Psychotherapy Progress Note    Psychotherapy Provided: Individual Psychotherapy     1. Anxiety        2. Depressed affect            Goals addressed in session: Goal 1     DATA: Met with Bethanie and otis getting her involved with groups along with a peer support. She is reporting nightmares on a regular basis. She will discuss this with her Rx provider     During this session, this clinician used the following therapeutic modalities: Client-centered Therapy    Substance Abuse was not addressed during this session. If the client is diagnosed with a co-occurring substance use disorder, please indicate any changes in the frequency or amount of use: Stage of change for addressing substance use diagnoses: No substance use/Not applicable    ASSESSMENT:  Bethanie Cronin presents with a Euthymic/ normal mood.     her affect is Normal range and intensity, which is congruent, with her mood and the content of the session. The client has made progress on their goals.    Bethanie Cronin presents with a none risk of suicide, none risk of self-harm, and none risk of harm to others.    For any risk assessment that surpasses a \"low\" rating, a safety plan must be developed.    A safety plan was indicated: no  If yes, describe in detail N/A    PLAN: Between sessions, Bethanie Cronin will explore how groups can help her mood. At the next session, the therapist will use Client-centered Therapy to address self care.    Behavioral Health Treatment Plan and Discharge Planning: Bethanie Cronin is aware of and agrees to continue to work on their treatment plan. They have identified and are working toward their discharge goals. yes    Depression Follow-up Plan Completed: Yes    Visit start and stop times:    12/31/24  Start Time: 1600  Stop Time: 1645  Total Visit Time: 45 minutes  "

## 2025-01-03 DIAGNOSIS — F41.0 GENERALIZED ANXIETY DISORDER WITH PANIC ATTACKS: ICD-10-CM

## 2025-01-03 DIAGNOSIS — F33.1 MAJOR DEPRESSIVE DISORDER, RECURRENT, MODERATE (HCC): ICD-10-CM

## 2025-01-03 DIAGNOSIS — F41.1 GENERALIZED ANXIETY DISORDER WITH PANIC ATTACKS: ICD-10-CM

## 2025-01-03 RX ORDER — VENLAFAXINE HYDROCHLORIDE 75 MG/1
75 CAPSULE, EXTENDED RELEASE ORAL DAILY
Qty: 30 CAPSULE | Refills: 0 | Status: SHIPPED | OUTPATIENT
Start: 2025-01-03

## 2025-01-03 NOTE — TELEPHONE ENCOUNTER
Reason for call:   [x] Refill   [] Prior Auth  [] Other:     Office:   [] PCP/Provider -   [x] Specialty/Provider - [facility] AM[facility] Pérez MD     Medication:  venlafaxine (EFFEXOR-XR) 75 mg 24 hr capsule    Dose/Frequency: TAKE 1 CAPSULE BY MOUTH EVERY DAY     Quantity:  30 capsule     Pharmacy: [facility]/pharmacy #[id number] [place] [address] [address] [facility] [phone]    Does the patient have enough for 3 days?   [] Yes   [x] No - Send as HP to POD

## 2025-01-06 ENCOUNTER — TELEPHONE (OUTPATIENT)
Age: 20
End: 2025-01-06

## 2025-01-06 NOTE — TELEPHONE ENCOUNTER
Patient's mom called regarding pt. Appointment on 1/7 at 4pm. Mom reported that pt. Will be about 10 mins late to this appointment due to work conflict and mom does not want to cancel or reschedule this appointment because pt. Needs it.     Writer informed mom that office has a 10min cut off and she may be called to reschedule anyway if she is going to be late. Mom expressed understanding and reported that she will try to be there on time for appointment

## 2025-01-07 ENCOUNTER — OFFICE VISIT (OUTPATIENT)
Dept: PSYCHIATRY | Facility: CLINIC | Age: 20
End: 2025-01-07
Payer: COMMERCIAL

## 2025-01-07 DIAGNOSIS — F41.0 GENERALIZED ANXIETY DISORDER WITH PANIC ATTACKS: ICD-10-CM

## 2025-01-07 DIAGNOSIS — F31.81 BIPOLAR 2 DISORDER (HCC): Primary | ICD-10-CM

## 2025-01-07 DIAGNOSIS — F41.1 GENERALIZED ANXIETY DISORDER WITH PANIC ATTACKS: ICD-10-CM

## 2025-01-07 PROCEDURE — 99214 OFFICE O/P EST MOD 30 MIN: CPT | Performed by: PSYCHIATRY & NEUROLOGY

## 2025-01-07 RX ORDER — LAMOTRIGINE 25 MG/1
TABLET ORAL
Qty: 46 TABLET | Refills: 0 | Status: SHIPPED | OUTPATIENT
Start: 2025-01-07 | End: 2025-02-06

## 2025-01-07 RX ORDER — VENLAFAXINE HYDROCHLORIDE 37.5 MG/1
37.5 CAPSULE, EXTENDED RELEASE ORAL DAILY
Qty: 30 CAPSULE | Refills: 1 | Status: SHIPPED | OUTPATIENT
Start: 2025-01-07

## 2025-01-07 NOTE — PSYCH
MEDICATION MANAGEMENT NOTE        [facility]      Name and Date of Birth:  Bethanie Cronin 19 y.o. 2005 MRN: [id number]    Date of Visit: January 7, 2025      Assessment & Plan  Generalized anxiety disorder with panic attacks    Bipolar 2 disorder (HCC)        Reason for Visit: Follow-up for medication management    Subjective: The patient came for appointment with her mom.  She reports her depression had been better since being on higher dose of Effexor XR but she also has been experiencing at times euphoric mood lasting from few hours to a couple of days where she will be drawing all the time, not sleeping much and missing her meals.  She reports the most she went without the sleep was 24-hour but there are some days when she might be able to sleep only 1 to 2 hours.  Her mom stated that she noticed patient occasionally having irritable mood, more energetic and not sleeping more than 1 to 2 hours lasting up to a week at a time.  The patient reported that her anxiety has been better and had not had any panic attacks lately.  She does still reports struggling with anxiety when she has to step out of the home.  She though denies any stressors nowadays.  Feels overall anxiety better when at home.  She denies any hopelessness or having any SI or HI.  Reports appetite has been okay.  Denies any auditory or visual hallucination.  Does not endorse any paranoia or delusional ideation.  Reports compliant with Effexor and other than concern mentioned above.  Denies any other side effects.        Review Of Systems: Negative other than mentioned above      Constitutional negative   ENT negative   Cardiovascular negative   Respiratory negative   Gastrointestinal negative   Genitourinary negative   Musculoskeletal negative   Integumentary negative   Neurological negative   Endocrine negative   Other Symptoms none       Suicide/Homicide Risk Assessment:     Risk of Harm to  Self:  The following ratings are based on assessment at the time of the interview  Demographic risk factors include: , age: young adult (15-24)  Historical Risk Factors include: history of depression, history of anxiety  Recent Specific Risk Factors include: diagnosis of depression  Protective Factors: no current suicidal ideation, access to mental health treatment, compliant with medications, compliant with mental health treatment, supportive father and mother, supportive family  Weapons: none and no firearms. The following steps have been taken to ensure weapons are properly secured: not applicable  Based on today's assessment, Bethanie presents the following risk of harm to self: minimal     Risk of Harm to Others:  The following ratings are based on assessment at the time of the interview  Based on today's assessment, Bethanie presents the following risk of harm to others: none     The following interventions are recommended: contracts for safety at present - agrees to go to ED if feeling unsafe, contracts for safety at present - agrees to call Crisis Intervention Service if feeling unsafe    Alcohol/Substance Abuse: Denies        Past Medical History:    Past Medical History:   Diagnosis Date    Anemia     Anxiety     Depression     Frequent headaches 05/20/2021    GERD (gastroesophageal reflux disease)     Iron (Fe) deficiency anemia         No past surgical history on file.  No Known Allergies    Current Medications:       Current Outpatient Medications:     lamoTRIgine (LaMICtal) 25 mg tablet, Take 1 tablet (25 mg total) by mouth daily for 14 days, THEN 2 tablets (50 mg total) daily for 16 days., Disp: 46 tablet, Rfl: 0    venlafaxine (EFFEXOR-XR) 37.5 mg 24 hr capsule, Take 1 capsule (37.5 mg total) by mouth daily In addition to 75 mg daily for total daily dose 112.5 mg., Disp: 30 capsule, Rfl: 1    famotidine (PEPCID) 40 MG tablet, Take 40 mg by mouth daily, Disp: , Rfl:     venlafaxine (EFFEXOR-XR)  75 mg 24 hr capsule, Take 1 capsule (75 mg total) by mouth daily, Disp: 30 capsule, Rfl: 0       History Review: The following portions of the patient's history were reviewed and updated as appropriate: allergies, current medications, past family history, past medical history, past social history, past surgical history, and problem list.         OBJECTIVE:     Vital signs in last 24 hours:    There were no vitals filed for this visit.    Mental Status Evaluation:    Appearance age appropriate, casually dressed   Behavior cooperative, calm   Speech normal rate, normal volume, normal pitch   Mood Mood instability, moderate anxiety   Affect blunted   Thought Processes organized, goal directed   Associations intact associations   Thought Content no overt delusions   Perceptual Disturbances: no auditory hallucinations, no visual hallucinations   Abnormal Thoughts  Risk Potential Suicidal ideation - None  Homicidal ideation - None  Potential for aggression - No   Orientation oriented to person, place, time/date, and situation   Memory recent and remote memory grossly intact   Consciousness alert and awake   Attention Span Concentration Span attention span and concentration are age appropriate   Intellect appears to be of average intelligence   Insight intact   Judgement intact   Muscle Strength and  Gait normal gait and normal balance   Motor activity no abnormal movements   Language no difficulty naming common objects, no difficulty repeating a phrase   Fund of Knowledge adequate knowledge of current events  adequate fund of knowledge regarding past history  adequate fund of knowledge regarding vocabulary    Pain none   Pain Scale 0       Laboratory Results: Most Recent Labs:   Lab Results   Component Value Date    WBC 10.12 10/17/2024    RBC 4.35 10/17/2024    HGB 9.8 (L) 10/17/2024    HCT 33.4 (L) 10/17/2024     10/17/2024    RDW 14.7 10/17/2024    NEUTROABS 6.88 10/17/2024    SODIUM 137 10/25/2024    K 3.4 (L)  10/25/2024     10/25/2024    CO2 24 10/25/2024    BUN 7 10/25/2024    CREATININE 0.56 (L) 10/25/2024    CALCIUM 9.7 10/25/2024    AST 21 10/17/2024    ALT 9 10/17/2024    ALKPHOS 56 10/17/2024    TP 7.8 10/17/2024    TBILI 0.85 10/17/2024    CHOLESTEROL 111 09/22/2022    TRIG 79 09/22/2022    HDL 47 (L) 09/22/2022    LDLCALC 48 09/22/2022    NONHDLC 64 09/22/2022    [id number] 1.419 09/22/2024     I have personally reviewed all pertinent laboratory/tests results.    Assessment/Plan: Based on her symptoms today I believe the patient does seem to have bipolar disorder type II.  The patient had been diagnosed with bipolar type II in the past but on my initial evaluation the patient had denied any symptoms for dileep or hypomania and was tentatively diagnosed with major depression and generalized anxiety disorder with panic attacks.  Though I feel based on patient history and confirmation by her mother she seems to have hypomanic episode lately which probably could have been also triggered by Effexor.  Though the Effexor also has helped significantly with her depression and anxiety compared to  before she was on it and I feel at this time will continue with that.  Though I reviewed with the patient in detail about medication options for bipolar disorder.  The patient has been on olanzapine and Vraylar in the past.  The patient does not recall much about the olanzapine but reports having side effects on Vraylar.  Her mother confirmed that she had tics on it.  Reviewed other alternatives including atypical antipsychotics, mood stabilizer such as Lamictal.  After reviewing the benefit versus risk of each of the class of the medication she felt comfortable starting with Lamictal at this time.  She will be started on Lamictal at the dose and schedule mentioned below for mood stability, depression, bipolar disorder.  The patient was advised that might not help much with her sleep directly but might help once she feels  her mood is more stable and calmer.  She does advise in the meantime can use over-the-counter melatonin or Unisom.  She though was advised if she still struggles with sleep I can review with her other alternatives for sleep which are more stronger than over-the-counter medication.  Patient and her mom agreed with the plan.  She and her mom was educated in detail about her current psychiatric medication including its benefit, risk, side effect, alternative, contraindication, dosage and frequency.  They were specially made aware of risk of rash on the Lamictal and advised to call me or call me crisis or 911 or visit nearby ER in case of rash or any other emergency or having SI or HI.  They verbalized understanding agrees with the plan.  She will follow with me in 4 weeks or sooner if needed.  I will continue with Effexor XR at 75 mg +37.5 mg  both capsule daily in the morning with no changes.      Diagnoses and all orders for this visit:    Bipolar 2 disorder (HCC)  -     lamoTRIgine (LaMICtal) 25 mg tablet; Take 1 tablet (25 mg total) by mouth daily for 14 days, THEN 2 tablets (50 mg total) daily for 16 days.    Generalized anxiety disorder with panic attacks  -     venlafaxine (EFFEXOR-XR) 37.5 mg 24 hr capsule; Take 1 capsule (37.5 mg total) by mouth daily In addition to 75 mg daily for total daily dose 112.5 mg.          Treatment Recommendations/Precautions:      Aware of 24 hour and weekend coverage for urgent situations accessed by calling [facility] main practice number    Risks/Benefits      Risks, Benefits And Possible Side Effects Of Medications:    Risks, benefits, and possible side effects of medications explained to Bethanie and she verbalizes understanding and agreement for treatment.  Risks of medications in pregnancy explained to Bethanie. She verbalizes understanding and agrees to notify her doctor if she becomes pregnant.    Controlled Medication Discussion:     Not  applicable    Psychotherapy Provided:     Individual psychotherapy provided: Counseling was provided during the session today for 12 minutes.  Medications, treatment progress and treatment plan reviewed with Bethanie.  Medication changes discussed with Bethanie.  Medication education provided to Bethanie.  Reassurance and supportive therapy provided.   Crisis/safety plan discussed with Bethanie.     Treatment Plan;    Completed and signed during the session: Not applicable - Treatment Plan not due at this session  Visit Time    Visit Start Time: 4:03 PM  Visit Stop Time: 4:30 PM  Total Visit Duration:  27 minutes        Madison Pérez MD 01/07/25

## 2025-01-10 ENCOUNTER — TELEPHONE (OUTPATIENT)
Dept: GASTROENTEROLOGY | Facility: AMBULARY SURGERY CENTER | Age: 20
End: 2025-01-10

## 2025-01-10 ENCOUNTER — OFFICE VISIT (OUTPATIENT)
Dept: GASTROENTEROLOGY | Facility: AMBULARY SURGERY CENTER | Age: 20
End: 2025-01-10
Payer: COMMERCIAL

## 2025-01-10 VITALS
HEIGHT: 61 IN | WEIGHT: 140.2 LBS | HEART RATE: 85 BPM | BODY MASS INDEX: 26.47 KG/M2 | OXYGEN SATURATION: 98 % | DIASTOLIC BLOOD PRESSURE: 68 MMHG | SYSTOLIC BLOOD PRESSURE: 100 MMHG

## 2025-01-10 DIAGNOSIS — R14.0 BLOATING: Primary | ICD-10-CM

## 2025-01-10 PROCEDURE — 99214 OFFICE O/P EST MOD 30 MIN: CPT | Performed by: INTERNAL MEDICINE

## 2025-01-10 RX ORDER — SIMETHICONE 80 MG
80 TABLET,CHEWABLE ORAL 3 TIMES DAILY PRN
Qty: 30 TABLET | Refills: 2 | Status: SHIPPED | OUTPATIENT
Start: 2025-01-10

## 2025-01-10 NOTE — ASSESSMENT & PLAN NOTE
Symptoms could be functional secondary to IBS but rule out small intestinal bacterial overgrowth.  Possible diet related.    -Explained to patient in detail about different possible etiologies and given reassurance.    -Schedule SIBO testing    -Advised to take Beano or Gas-X and year.  Prescription was sent for simethicone chewables.    -Avoid flatulent foods like cabbage, broccoli, peas, onion, peas    -Try to maintain a diary to write down all the foods she had prior to symptoms    Orders:    Small intestinal bacterial overgrowth    simethicone (MYLICON) 80 mg chewable tablet; Chew 1 tablet (80 mg total) 3 (three) times a day as needed for flatulence    
27-Mar-2022 19:58

## 2025-01-10 NOTE — TELEPHONE ENCOUNTER
Just as an FYI patient received SIBO test today and once the test is completed patients mother will need to drop off test at the [place] office.    Thank you!

## 2025-01-10 NOTE — PROGRESS NOTES
Name: Bethanie Cronin      : 2005      MRN: [id number]  Encounter Provider: Zackary Gee MD  Encounter Date: 1/10/2025   Encounter department: [facility]  :  Assessment & Plan  Bloating    Symptoms could be functional secondary to IBS but rule out small intestinal bacterial overgrowth.  Possible diet related.    -Explained to patient in detail about different possible etiologies and given reassurance.    -Schedule SIBO testing    -Advised to take Beano or Gas-X and year.  Prescription was sent for simethicone chewables.    -Avoid flatulent foods like cabbage, broccoli, peas, onion, peas    -Try to maintain a diary to write down all the foods she had prior to symptoms    Orders:    Small intestinal bacterial overgrowth    simethicone (MYLICON) 80 mg chewable tablet; Chew 1 tablet (80 mg total) 3 (three) times a day as needed for flatulence        History of Present Illness   Abdominal Pain  Pertinent negatives include no anxiety, arthralgias, dysuria, fever, headaches, hematuria, myalgias, rash or sore throat.     Bethanie Cronin is a 19 y.o. female who presents for follow-up.  Patient was seen about a year ago and had EGD colonoscopy done at that time.  She comes in now with worsening symptoms of gassiness and burping.  Feels like bloated at times and denies any specific relationship with meal or particular foods.  Denies any acid reflux and reports using Pepcid only as needed.  Denies any difficulty swallowing.  Good appetite, no recent weight loss.  Regular bowel movements and denies any blood or mucus in the stool.  She was tested negative on the celiac disease panel    Chaperon: Ms. Swartze    Review of Systems   Constitutional:  Negative for activity change, appetite change, chills, diaphoresis, fatigue, fever and unexpected weight change.   HENT:  Negative for ear discharge, ear pain, facial swelling, hearing loss, nosebleeds, sore throat, tinnitus and voice  "change.    Eyes:  Negative for pain, discharge, redness, itching and visual disturbance.   Respiratory:  Negative for apnea, cough, chest tightness, shortness of breath and wheezing.    Cardiovascular:  Negative for chest pain and palpitations.   Gastrointestinal:         As noted in HPI   Endocrine: Negative for cold intolerance, heat intolerance and polyuria.   Genitourinary:  Negative for difficulty urinating, dysuria, flank pain, hematuria and urgency.   Musculoskeletal:  Negative for arthralgias, back pain, gait problem, joint swelling and myalgias.   Skin:  Negative for rash and wound.   Neurological:  Negative for dizziness, tremors, seizures, speech difficulty, light-headedness, numbness and headaches.   Hematological:  Negative for adenopathy. Does not bruise/bleed easily.   Psychiatric/Behavioral:  Negative for agitation, behavioral problems and confusion. The patient is not nervous/anxious.           Objective   /68 (BP Location: Left arm, Patient Position: Sitting, Cuff Size: Standard)   Pulse 85   Ht 5' 1\" (1.549 m)   Wt 63.6 kg (140 lb 3.2 oz)   SpO2 98%   BMI 26.49 kg/m²      Physical Exam  Constitutional:       Appearance: Normal appearance. She is well-developed.   HENT:      Head: Normocephalic and atraumatic.      Nose: Nose normal.   Eyes:      Conjunctiva/sclera: Conjunctivae normal.   Neck:      Thyroid: No thyromegaly.      Vascular: No JVD.      Trachea: No tracheal deviation.   Cardiovascular:      Rate and Rhythm: Normal rate and regular rhythm.      Heart sounds: Normal heart sounds. No murmur heard.     No friction rub. No gallop.   Pulmonary:      Effort: Pulmonary effort is normal. No respiratory distress.      Breath sounds: Normal breath sounds. No wheezing or rales.   Abdominal:      General: Bowel sounds are normal. There is no distension.      Palpations: Abdomen is soft. There is no mass.      Tenderness: There is no abdominal tenderness. There is no guarding.      " Hernia: No hernia is present.   Musculoskeletal:         General: No tenderness or deformity.      Cervical back: Neck supple.      Right lower leg: No edema.      Left lower leg: No edema.   Lymphadenopathy:      Cervical: No cervical adenopathy.   Skin:     General: Skin is warm and dry.      Findings: No erythema or rash.   Neurological:      Mental Status: She is alert and oriented to person, place, and time.   Psychiatric:         Mood and Affect: Mood normal.         Behavior: Behavior normal.         Thought Content: Thought content normal.

## 2025-01-15 ENCOUNTER — OFFICE VISIT (OUTPATIENT)
Dept: FAMILY MEDICINE CLINIC | Facility: CLINIC | Age: 20
End: 2025-01-15
Payer: COMMERCIAL

## 2025-01-15 VITALS
BODY MASS INDEX: 26.62 KG/M2 | WEIGHT: 141 LBS | HEIGHT: 61 IN | TEMPERATURE: 97.5 F | DIASTOLIC BLOOD PRESSURE: 70 MMHG | SYSTOLIC BLOOD PRESSURE: 118 MMHG | OXYGEN SATURATION: 97 % | RESPIRATION RATE: 16 BRPM | HEART RATE: 99 BPM

## 2025-01-15 DIAGNOSIS — D50.8 OTHER IRON DEFICIENCY ANEMIA: Primary | ICD-10-CM

## 2025-01-15 DIAGNOSIS — E53.8 CYANOCOBALAMIN DEFICIENCY: ICD-10-CM

## 2025-01-15 PROCEDURE — 99213 OFFICE O/P EST LOW 20 MIN: CPT | Performed by: NURSE PRACTITIONER

## 2025-01-15 NOTE — PROGRESS NOTES
"Name: Bethanie Cronin      : 2005      MRN: [id number]  Encounter Provider: MANNY Johnson  Encounter Date: 1/15/2025   Encounter department: [facility]  :  Assessment & Plan  Other iron deficiency anemia  Will check iron studies and B12 to determine next step  If there are clear improvements after 6 treatments of IV iron and B12, I will order maintenance infusion on monthly basis  Patient will go within the week for blood work-I will call to discuss results  Orders:    Iron Panel (Includes Ferritin, Iron Sat%, Iron, and TIBC); Future    CBC and differential; Future    Vitamin B12; Future    Cyanocobalamin deficiency    Orders:    Iron Panel (Includes Ferritin, Iron Sat%, Iron, and TIBC); Future    CBC and differential; Future    Vitamin B12; Future           History of Present Illness     Here with mom to follow up VJ  She had last of 6 iron infusions on 24  Feeling slightly better  Will do testing for GI to r/o SIBO later today      Review of Systems   Constitutional:  Positive for fatigue.   Gastrointestinal:         Ongoing gi issues       Objective   /70 (BP Location: Left arm, Patient Position: Sitting, Cuff Size: Large)   Pulse 99   Temp 97.5 °F (36.4 °C) (Temporal)   Resp 16   Ht 5' 1\" (1.549 m)   Wt 64 kg (141 lb)   SpO2 97%   BMI 26.64 kg/m²      Physical Exam  Vitals and nursing note reviewed.   Constitutional:       General: She is not in acute distress.     Appearance: Normal appearance.   Skin:     General: Skin is warm and dry.      Coloration: Skin is not pale.   Neurological:      Mental Status: She is alert.         "

## 2025-01-15 NOTE — ASSESSMENT & PLAN NOTE
Orders:    Iron Panel (Includes Ferritin, Iron Sat%, Iron, and TIBC); Future    CBC and differential; Future    Vitamin B12; Future

## 2025-01-15 NOTE — ASSESSMENT & PLAN NOTE
Will check iron studies and B12 to determine next step  If there are clear improvements after 6 treatments of IV iron and B12, I will order maintenance infusion on monthly basis  Patient will go within the week for blood work-I will call to discuss results  Orders:    Iron Panel (Includes Ferritin, Iron Sat%, Iron, and TIBC); Future    CBC and differential; Future    Vitamin B12; Future

## 2025-01-16 ENCOUNTER — TELEPHONE (OUTPATIENT)
Dept: GASTROENTEROLOGY | Facility: AMBULARY SURGERY CENTER | Age: 20
End: 2025-01-16

## 2025-01-17 ENCOUNTER — OFFICE VISIT (OUTPATIENT)
Dept: GASTROENTEROLOGY | Facility: CLINIC | Age: 20
End: 2025-01-17
Payer: COMMERCIAL

## 2025-01-17 DIAGNOSIS — R14.0 BLOATING: Primary | ICD-10-CM

## 2025-01-17 PROCEDURE — 91065 BREATH HYDROGEN/METHANE TEST: CPT | Performed by: INTERNAL MEDICINE

## 2025-01-21 NOTE — PROGRESS NOTES
[facility]       Bacterial Overgrowth Analytical Record    Bethanie Cronin 19 y.o. female MRN: [id number]      Date of Test: 01/15/2025    Substrate Given: Lactulose    Ordering Provider: Zackary Gee MD    Medical Assistant: DOROTHY    Symptoms: bloating    The patient presents for bacterial overgrowth testing.    Patient fasted overnight. Baseline readings obtained.   Breath test performed every 20 min for a total of 3 hr    Sample Clock Time ppmH2 ppmCH4 Co2% Tomi   Baseline 1120PM   3 44 3.7 1.48   #1  20 minutes 1153 4 56 3.9 1.41   #2  40 minutes 1213 10 102 4.3 1.27   #3  60 minutes 1236 11 148 4.0 1.37   #4  80 minutes 1256 16 187 4.2 1.30   #5  100 minutes 117 AM 19 191 4.4 1.25   #6  120 minutes 138 18 137 4.3 1.27   #7  140 minutes 201 16 134 4.5 1.22   #8  160 minutes 225 19 165 4.1 1.34   #9  180 minutes 245 13 145 3.9 1.41       Physician interpretation:     Positive breath test results for methane bacterial overgrowth.     Recommend treatment with antibiotics (Neomycin 500mg PO BID x 10 days and Rifaximin 550mg PO TID x 10 days).     Mary Santiago MD

## 2025-01-22 ENCOUNTER — TELEPHONE (OUTPATIENT)
Dept: PSYCHIATRY | Facility: CLINIC | Age: 20
End: 2025-01-22

## 2025-01-27 ENCOUNTER — TELEPHONE (OUTPATIENT)
Age: 20
End: 2025-01-27

## 2025-01-27 DIAGNOSIS — F41.1 GENERALIZED ANXIETY DISORDER WITH PANIC ATTACKS: ICD-10-CM

## 2025-01-27 DIAGNOSIS — F33.1 MAJOR DEPRESSIVE DISORDER, RECURRENT, MODERATE (HCC): ICD-10-CM

## 2025-01-27 DIAGNOSIS — F41.0 GENERALIZED ANXIETY DISORDER WITH PANIC ATTACKS: ICD-10-CM

## 2025-01-27 NOTE — TELEPHONE ENCOUNTER
Patient is requesting a call back from the provider, regarding the following:  Recent breakdowns    Writer offered for the patient to speak with a nurse, but patient declined.    Writer advised they would route a message to the provider, for a call back.

## 2025-01-28 RX ORDER — VENLAFAXINE HYDROCHLORIDE 75 MG/1
75 CAPSULE, EXTENDED RELEASE ORAL DAILY
Qty: 30 CAPSULE | Refills: 0 | Status: SHIPPED | OUTPATIENT
Start: 2025-01-28 | End: 2025-02-07 | Stop reason: SDUPTHER

## 2025-02-03 ENCOUNTER — TELEPHONE (OUTPATIENT)
Dept: GASTROENTEROLOGY | Facility: CLINIC | Age: 20
End: 2025-02-03

## 2025-02-03 DIAGNOSIS — K63.8219 SMALL INTESTINAL BACTERIAL OVERGROWTH (SIBO): Primary | ICD-10-CM

## 2025-02-03 DIAGNOSIS — F31.81 BIPOLAR 2 DISORDER (HCC): ICD-10-CM

## 2025-02-03 DIAGNOSIS — K63.8219 SMALL INTESTINAL BACTERIAL OVERGROWTH (SIBO): ICD-10-CM

## 2025-02-03 RX ORDER — LAMOTRIGINE 25 MG/1
50 TABLET ORAL DAILY
Qty: 60 TABLET | Refills: 0 | Status: SHIPPED | OUTPATIENT
Start: 2025-02-03 | End: 2025-02-07 | Stop reason: SDUPTHER

## 2025-02-03 RX ORDER — NEOMYCIN SULFATE 500 MG/1
500 TABLET ORAL 2 TIMES DAILY
Qty: 20 TABLET | Refills: 0 | Status: SHIPPED | OUTPATIENT
Start: 2025-02-03 | End: 2025-02-13

## 2025-02-03 NOTE — TELEPHONE ENCOUNTER
breath results  (Newest Message First)Zackary Gee MD to Evelina Haider MA  Me       2/3/25  3:55 PM  I sent a reply back with the patient with positive SIBO testing and sent prescription for the antibiotics.    Carri-please schedule follow-up office visit with PA in a couple of months    Evelina-I never received these results for SIBO testing.  Can you please look into this

## 2025-02-04 ENCOUNTER — TELEPHONE (OUTPATIENT)
Age: 20
End: 2025-02-04

## 2025-02-04 DIAGNOSIS — K63.8219 SMALL INTESTINAL BACTERIAL OVERGROWTH (SIBO): ICD-10-CM

## 2025-02-04 RX ORDER — LACTULOSE 10 G/15ML
SOLUTION ORAL
Refills: 0 | OUTPATIENT
Start: 2025-02-04

## 2025-02-04 NOTE — TELEPHONE ENCOUNTER
PA for xifaxan 550 mg    SUBMITTED to [email or web address]    via      [x][employer]-Case ID #     [id number]    Payer: [facility] [facility] [employer]   Phone: [phone]   Fax: [phone]         All office notes, labs and other pertaining documents and studies sent. Clinical questions answered. Awaiting determination from insurance company.     Turnaround time for your insurance to make a decision on your Prior Authorization can take 7-21 business days.

## 2025-02-04 NOTE — TELEPHONE ENCOUNTER
PA for Xifaxan DENIED    Reason:(Screenshot if applicable)        Message sent to office clinical pool Yes    Denial letter scanned into Media Yes    Appeal started No (Provider will need to decide if appeal is warranted and send clinical documentation to Prior Authorization Team for initiation.)    **Please follow up with your patient regarding denial and next steps**

## 2025-02-05 DIAGNOSIS — K63.8219 SMALL INTESTINAL BACTERIAL OVERGROWTH (SIBO): Primary | ICD-10-CM

## 2025-02-05 RX ORDER — LACTULOSE 10 G/15ML
SOLUTION ORAL
Refills: 0 | OUTPATIENT
Start: 2025-02-05

## 2025-02-05 RX ORDER — SULFAMETHOXAZOLE AND TRIMETHOPRIM 800; 160 MG/1; MG/1
1 TABLET ORAL EVERY 12 HOURS SCHEDULED
Qty: 28 TABLET | Refills: 0 | Status: SHIPPED | OUTPATIENT
Start: 2025-02-05 | End: 2025-02-19

## 2025-02-05 NOTE — TELEPHONE ENCOUNTER
Please let patient know that Xifaxan was unfortunately denied.  Instead recommend patient use Bactrim along with neomycin.  I placed a new prescription for this.  Please make sure both antibiotics are started together. Thank you

## 2025-02-07 ENCOUNTER — TELEMEDICINE (OUTPATIENT)
Dept: PSYCHIATRY | Facility: CLINIC | Age: 20
End: 2025-02-07
Payer: COMMERCIAL

## 2025-02-07 DIAGNOSIS — F41.1 GENERALIZED ANXIETY DISORDER WITH PANIC ATTACKS: ICD-10-CM

## 2025-02-07 DIAGNOSIS — F31.81 BIPOLAR 2 DISORDER (HCC): ICD-10-CM

## 2025-02-07 DIAGNOSIS — F41.0 GENERALIZED ANXIETY DISORDER WITH PANIC ATTACKS: ICD-10-CM

## 2025-02-07 PROCEDURE — 99214 OFFICE O/P EST MOD 30 MIN: CPT | Performed by: PSYCHIATRY & NEUROLOGY

## 2025-02-07 RX ORDER — VENLAFAXINE HYDROCHLORIDE 37.5 MG/1
37.5 CAPSULE, EXTENDED RELEASE ORAL DAILY
Qty: 30 CAPSULE | Refills: 1 | Status: SHIPPED | OUTPATIENT
Start: 2025-02-07

## 2025-02-07 RX ORDER — VENLAFAXINE HYDROCHLORIDE 75 MG/1
75 CAPSULE, EXTENDED RELEASE ORAL DAILY
Qty: 30 CAPSULE | Refills: 1 | Status: SHIPPED | OUTPATIENT
Start: 2025-02-07

## 2025-02-07 RX ORDER — LAMOTRIGINE 25 MG/1
75 TABLET ORAL DAILY
Qty: 90 TABLET | Refills: 0 | Status: SHIPPED | OUTPATIENT
Start: 2025-02-07 | End: 2025-03-09

## 2025-02-07 NOTE — PSYCH
Virtual Regular Visit    Verification of patient location:    Patient is located at Home in the following state in which I hold an active license [place]      Assessment/Plan:    Problem List Items Addressed This Visit       Generalized anxiety disorder with panic attacks    Relevant Medications    venlafaxine (EFFEXOR-XR) 37.5 mg 24 hr capsule    venlafaxine (EFFEXOR-XR) 75 mg 24 hr capsule     Other Visit Diagnoses         Bipolar 2 disorder (HCC)        Relevant Medications    venlafaxine (EFFEXOR-XR) 37.5 mg 24 hr capsule    venlafaxine (EFFEXOR-XR) 75 mg 24 hr capsule    lamoTRIgine (LaMICtal) 25 mg tablet            Goals addressed in session: Goal 1 and Goal 2       Reason for visit is   Chief Complaint   Patient presents with    Virtual Regular Visit          Encounter provider Madison Pérez MD      Recent Visits  No visits were found meeting these conditions.  Showing recent visits within past 7 days and meeting all other requirements  Today's Visits  Date Type Provider Dept   02/07/25 Telemedicine Madison Pérez MD  [facility]   Showing today's visits and meeting all other requirements  Future Appointments  No visits were found meeting these conditions.  Showing future appointments within next 150 days and meeting all other requirements       The patient was identified by name and date of birth. Bethanie Cronin was informed that this is a telemedicine visit and that the visit is being conducted throughthe Epic Embedded platform. She agrees to proceed..  My office door was closed. No one else was in the room.  She acknowledged consent and understanding of privacy and security of the video platform. The patient has agreed to participate and understands they can discontinue the visit at any time.    Patient is aware this is a billable service.     Subjective    See below    HPI     Past Medical History:   Diagnosis Date    Anemia     Anxiety     Depression     Frequent headaches  05/20/2021    GERD (gastroesophageal reflux disease)     Iron (Fe) deficiency anemia        History reviewed. No pertinent surgical history.    Current Outpatient Medications   Medication Sig Dispense Refill    lamoTRIgine (LaMICtal) 25 mg tablet Take 3 tablets (75 mg total) by mouth daily 90 tablet 0    venlafaxine (EFFEXOR-XR) 37.5 mg 24 hr capsule Take 1 capsule (37.5 mg total) by mouth daily In addition to 75 mg daily for total daily dose 112.5 mg. 30 capsule 1    venlafaxine (EFFEXOR-XR) 75 mg 24 hr capsule Take 1 capsule (75 mg total) by mouth daily 30 capsule 1    famotidine (PEPCID) 40 MG tablet Take 40 mg by mouth daily      neomycin (MYCIFRADIN) 500 mg tablet Take 1 tablet (500 mg total) by mouth 2 (two) times a day for 10 days 20 tablet 0    rifaximin (XIFAXAN) 550 mg tablet Take 1 tablet (550 mg total) by mouth every 8 (eight) hours for 10 days 30 tablet 0    simethicone (MYLICON) 80 mg chewable tablet Chew 1 tablet (80 mg total) 3 (three) times a day as needed for flatulence 30 tablet 2    sulfamethoxazole-trimethoprim (BACTRIM DS) 800-160 mg per tablet Take 1 tablet by mouth every 12 (twelve) hours for 14 days 28 tablet 0     No current facility-administered medications for this visit.        No Known Allergies    Review of Systems    Video Exam    There were no vitals filed for this visit.    Physical Exam     Visit Time    Visit Start Time: 4:30 PM  Visit Stop Time: 4:49 PM  Total Visit Duration:  19 minutes  MEDICATION MANAGEMENT NOTE        [facility]      Name and Date of Birth:  Btehanie Cronin 19 y.o. 2005 MRN: [id number]    Date of Visit: February 7, 2025      Assessment & Plan  Generalized anxiety disorder with panic attacks    Bipolar 2 disorder (HCC)        Reason for Visit: Follow-up for medication management    Subjective: The patient reports she has been overall doing better since she last saw me.  Reports Lamictal initially helped  and her mood was more stable, sleeping in time, having normal energy level but  from last 1 week she again has been having difficulty falling asleep, having elevated energy level and mild mood swings.  She though denies any significant depression but reports feels low at times.  Reports anxiety has been much better.  Reports she also has been able to go out more often without any significant anxiety in public places.  Feels current medication has been helpful.  She reports having a rash on her face for last few weeks.  It was a virtual visit due to which it was not very clear but it seems like brownish patch on her right cheek.  The patient reports it looks red in color.  It was flat and not elevated.  The patient denies any itching.  She did reports she has been struggling with skin dryness.  She denies rash anywhere else.  Denies any rash on lips or buccal mucosa.  The patient at this time was advised to keep monitoring the rash and in case if it increases or spreads to stop the Lamictal and to call my office or to call crisis or 911.  The patient agreed.  She denies any other side effects. She also denies any other medical issues.      Review Of Systems: Negative other than mentioned above      Constitutional negative   ENT negative   Cardiovascular negative   Respiratory negative   Gastrointestinal negative   Genitourinary negative   Musculoskeletal negative   Integumentary negative   Neurological negative   Endocrine negative   Other Symptoms none       Suicide/Homicide Risk Assessment:     Risk of Harm to Self:  The following ratings are based on assessment at the time of the interview  Demographic risk factors include: , age: young adult (15-24)  Historical Risk Factors include: history of depression, history of anxiety  Recent Specific Risk Factors include: diagnosis of depression  Protective Factors: no current suicidal ideation, access to mental health treatment, compliant with medications,  compliant with mental health treatment, supportive father and mother, supportive family  Weapons: none and no firearms. The following steps have been taken to ensure weapons are properly secured: not applicable  Based on today's assessment, Bethanie presents the following risk of harm to self: minimal     Risk of Harm to Others:  The following ratings are based on assessment at the time of the interview  Based on today's assessment, Bethanie presents the following risk of harm to others: none     The following interventions are recommended: contracts for safety at present - agrees to go to ED if feeling unsafe, contracts for safety at present - agrees to call Crisis Intervention Service if feeling unsafe      Alcohol/Substance Abuse: Denies        Past Medical History:    Past Medical History:   Diagnosis Date    Anemia     Anxiety     Depression     Frequent headaches 05/20/2021    GERD (gastroesophageal reflux disease)     Iron (Fe) deficiency anemia         History reviewed. No pertinent surgical history.  No Known Allergies    Current Medications:       Current Outpatient Medications:     lamoTRIgine (LaMICtal) 25 mg tablet, Take 3 tablets (75 mg total) by mouth daily, Disp: 90 tablet, Rfl: 0    venlafaxine (EFFEXOR-XR) 37.5 mg 24 hr capsule, Take 1 capsule (37.5 mg total) by mouth daily In addition to 75 mg daily for total daily dose 112.5 mg., Disp: 30 capsule, Rfl: 1    venlafaxine (EFFEXOR-XR) 75 mg 24 hr capsule, Take 1 capsule (75 mg total) by mouth daily, Disp: 30 capsule, Rfl: 1    famotidine (PEPCID) 40 MG tablet, Take 40 mg by mouth daily, Disp: , Rfl:     neomycin (MYCIFRADIN) 500 mg tablet, Take 1 tablet (500 mg total) by mouth 2 (two) times a day for 10 days, Disp: 20 tablet, Rfl: 0    rifaximin (XIFAXAN) 550 mg tablet, Take 1 tablet (550 mg total) by mouth every 8 (eight) hours for 10 days, Disp: 30 tablet, Rfl: 0    simethicone (MYLICON) 80 mg chewable tablet, Chew 1 tablet (80 mg total) 3 (three)  times a day as needed for flatulence, Disp: 30 tablet, Rfl: 2    sulfamethoxazole-trimethoprim (BACTRIM DS) 800-160 mg per tablet, Take 1 tablet by mouth every 12 (twelve) hours for 14 days, Disp: 28 tablet, Rfl: 0       History Review: The following portions of the patient's history were reviewed and updated as appropriate: allergies, current medications, past family history, past medical history, past social history, past surgical history, and problem list.         OBJECTIVE:     Vital signs in last 24 hours:    There were no vitals filed for this visit.    Mental Status Evaluation:    Appearance age appropriate, casually dressed   Behavior cooperative, calm   Speech normal rate, normal volume, normal pitch   Mood Mild mood swings, improved depression and anxiety   Affect mildly constricted   Thought Processes organized, goal directed   Associations intact associations   Thought Content no overt delusions   Perceptual Disturbances: no auditory hallucinations, no visual hallucinations   Abnormal Thoughts  Risk Potential Suicidal ideation - None  Homicidal ideation - None  Potential for aggression - No   Orientation oriented to person, place, time/date, and situation   Memory recent and remote memory grossly intact   Consciousness alert and awake   Attention Span Concentration Span attention span and concentration are age appropriate   Intellect appears to be of average intelligence   Insight intact   Judgement intact   Muscle Strength and  Gait unable to assess today due to virtual visit   Motor activity unable to assess today due to virtual visit   Language no difficulty naming common objects, no difficulty repeating a phrase   Fund of Knowledge adequate knowledge of current events  adequate fund of knowledge regarding past history  adequate fund of knowledge regarding vocabulary    Pain none   Pain Scale 0       Laboratory Results: Most Recent Labs:   Lab Results   Component Value Date    WBC 10.12 10/17/2024     RBC 4.35 10/17/2024    HGB 9.8 (L) 10/17/2024    HCT 33.4 (L) 10/17/2024     10/17/2024    RDW 14.7 10/17/2024    NEUTROABS 6.88 10/17/2024    SODIUM 137 10/25/2024    K 3.4 (L) 10/25/2024     10/25/2024    CO2 24 10/25/2024    BUN 7 10/25/2024    CREATININE 0.56 (L) 10/25/2024    CALCIUM 9.7 10/25/2024    AST 21 10/17/2024    ALT 9 10/17/2024    ALKPHOS 56 10/17/2024    TP 7.8 10/17/2024    TBILI 0.85 10/17/2024    CHOLESTEROL 111 09/22/2022    TRIG 79 09/22/2022    HDL 47 (L) 09/22/2022    LDLCALC 48 09/22/2022    NONHDLC 64 09/22/2022    [id number] 1.419 09/22/2024     I have personally reviewed all pertinent laboratory/tests results.    Assessment/Plan: The patient overall has been doing better on the current medication though still struggles with mild mood swings, some anxiety and depression.  No SI or HI.  This has been significant improvement compared to her mental health when I saw her first few times.  The patient reports rash though at this time it does not seem it is due to Lamictal but the patient has been advised to monitor closely and if it increases to stop the medication Lamictal.  Given the patient still struggling with mild mood swings I will increase the dose of Lamictal slightly to 75 mg daily from 50 mg daily.  I will continue with the Effexor XR with no changes.  The patient was advised in case she notice increase in rash increased then to stop the medication and call my office or call crisis on 911.  Patient verbalized understanding and agrees with the plan.  She again was educated in detail about her current psych meds including benefit, risk, alternative, contraindication, dosage, frequency.  She verbalized understanding agrees with the plan.       Diagnoses and all orders for this visit:    Generalized anxiety disorder with panic attacks  -     venlafaxine (EFFEXOR-XR) 37.5 mg 24 hr capsule; Take 1 capsule (37.5 mg total) by mouth daily In addition to 75 mg daily for total  daily dose 112.5 mg.  -     venlafaxine (EFFEXOR-XR) 75 mg 24 hr capsule; Take 1 capsule (75 mg total) by mouth daily    Bipolar 2 disorder (HCC)  -     lamoTRIgine (LaMICtal) 25 mg tablet; Take 3 tablets (75 mg total) by mouth daily          Treatment Recommendations/Precautions:      Aware of 24 hour and weekend coverage for urgent situations accessed by calling [facility] main practice number    Risks/Benefits      Risks, Benefits And Possible Side Effects Of Medications:    Risks, benefits, and possible side effects of medications explained to Bethanie and she verbalizes understanding and agreement for treatment.  Risks of medications in pregnancy explained to Bethanie. She verbalizes understanding and agrees to notify her doctor if she becomes pregnant.    Controlled Medication Discussion:     Not applicable    Psychotherapy Provided:     Individual psychotherapy provided: Medications, treatment progress and treatment plan reviewed with Bethanie.  Medication changes discussed with Bethanie.  Medication education provided to Bethanie.  Reassurance and supportive therapy provided.   Crisis/safety plan discussed with Bethanie.     Treatment Plan;    Completed and signed during the session: Not applicable - Treatment Plan not due at this session      Madison Pérez MD 02/07/25

## 2025-02-11 ENCOUNTER — SOCIAL WORK (OUTPATIENT)
Dept: BEHAVIORAL/MENTAL HEALTH CLINIC | Facility: CLINIC | Age: 20
End: 2025-02-11
Payer: COMMERCIAL

## 2025-02-11 DIAGNOSIS — F31.81 BIPOLAR II DISORDER (HCC): ICD-10-CM

## 2025-02-11 DIAGNOSIS — F40.10 SOCIAL ANXIETY DISORDER: ICD-10-CM

## 2025-02-11 DIAGNOSIS — F33.3 SEVERE EPISODE OF RECURRENT MAJOR DEPRESSIVE DISORDER, WITH PSYCHOTIC FEATURES (HCC): Primary | ICD-10-CM

## 2025-02-11 PROCEDURE — 90834 PSYTX W PT 45 MINUTES: CPT | Performed by: COUNSELOR

## 2025-02-11 NOTE — PSYCH
Outpatient Behavioral Health Psychotherapy Treatment Plan    Bethanie Cronin  2005     Date of Initial Psychotherapy Assessment: ***   Date of Current Treatment Plan: 02/11/25  Treatment Plan Target Date: ***  Treatment Plan Expiration Date: ***    Diagnosis:   No diagnosis found.    Area(s) of Need: ***    Long Term Goal 1 (in the client's own words): ***    Stage of Change: {SL AMB PSYCH STAGE OF CHANGE:12573}    Target Date for completion: ***     Anticipated therapeutic modalities: ***     People identified to complete this goal: ***      Objective 1: (identify the means of measuring success in meeting the objective): ***      Objective 2: (identify the means of measuring success in meeting the objective): ***      Long Term Goal 2 (in the client's own words): ***    Stage of Change: {SL AMB PSYCH STAGE OF CHANGE:76901}    Target Date for completion: ***     Anticipated therapeutic modalities: ***     People identified to complete this goal: ***      Objective 1: (identify the means of measuring success in meeting the objective): ***      Objective 2: (identify the means of measuring success in meeting the objective): ***     Long Term Goal 3 (in the client's own words): ***    Stage of Change: {SL AMB PSYCH STAGE OF CHANGE:70290}    Target Date for completion: ***     Anticipated therapeutic modalities: ***     People identified to complete this goal: ***      Objective 1: (identify the means of measuring success in meeting the objective): ***      Objective 2: (identify the means of measuring success in meeting the objective): ***     I am currently under the care of a [facility] psychiatric provider: {YES/NO:20200}    My [facility] psychiatric provider is: ***    I am currently taking psychiatric medications: {SL AMB TAKING PSYCH MEDS:89107}    I feel that I will be ready for discharge from mental health care when I reach the following (measurable goal/objective): ***    For children and adults who  have a legal guardian:   Has there been any change to custody orders and/or guardianship status? {Yes/No/NA:14060}. If yes, attach updated documentation.    I have { AMB PSYCH CREATED/UPDATED:31684} my Crisis Plan and have been offered a copy of this plan    Behavioral Health Treatment Plan [facility]: Diagnosis and Treatment Plan explained to Bethanie Cronin {acknowledge/does not acknowledge:83834} an understanding of their diagnosis. Bethanie Cronin { AMB PSYCH AGREE/DISAGREE:95758} this treatment plan.    I have been offered a copy of this Treatment Plan. {YES/NO:20200}

## 2025-02-18 NOTE — PSYCH
"Behavioral Health Psychotherapy Progress Note    Psychotherapy Provided: Individual Psychotherapy     1. Severe episode of recurrent major depressive disorder, with psychotic features (HCC)        2. Social anxiety disorder        3. Bipolar II disorder (HCC)            Goals addressed in session: Goal 1     DATA: Met with Bethanie who reports that a person she has been talking to from [place]. Came to [place] last weekend to visit her. She reported that they ended up in a hotel and got romantic which she later regretted. We discussed that she still wants him to remain as a friend but not in a physical way with sexual contact. We discussed how she should start to look at developing a plan/ script to talk with him about the incident and set of boundaries. We also signed her up fore [employer] transportation to appointments  During this session, this clinician used the following therapeutic modalities: Client-centered Therapy    Substance Abuse was not addressed during this session. If the client is diagnosed with a co-occurring substance use disorder, please indicate any changes in the frequency or amount of use: . Stage of change for addressing substance use diagnoses: No substance use/Not applicable    ASSESSMENT:  Bethanie Cronin presents with a Euthymic/ normal mood.     her affect is Normal range and intensity, which is not congruent, with her mood and the content of the session. The client has made progress on their goals.     Bethanie Cronin presents with a none risk of suicide, none risk of self-harm, and none risk of harm to others.    For any risk assessment that surpasses a \"low\" rating, a safety plan must be developed.    A safety plan was indicated: no  If yes, describe in detail N/A    PLAN: Between sessions, Bethanie Cronin will develop a script and review in next session on approaching the subject of friendship versus romance. At the next session, the therapist will use Client-centered Therapy to address self " care.    Behavioral Health Treatment Plan and Discharge Planning: Bethanie Cronin is aware of and agrees to continue to work on their treatment plan. They have identified and are working toward their discharge goals. yes    Depression Follow-up Plan Completed: No    Visit start and stop times:    02/18/25  Start Time: 1600  Stop Time: 1645  Total Visit Time: 45 minutes

## 2025-02-26 DIAGNOSIS — F31.81 BIPOLAR 2 DISORDER (HCC): ICD-10-CM

## 2025-02-26 RX ORDER — LAMOTRIGINE 25 MG/1
75 TABLET ORAL DAILY
Qty: 90 TABLET | Refills: 0 | Status: SHIPPED | OUTPATIENT
Start: 2025-02-26 | End: 2025-03-28

## 2025-02-26 NOTE — TELEPHONE ENCOUNTER
Reason for call:   [x] Refill   [] Prior Auth  [x] Other: Patient was not allowed to get the script that was sent on 2/7/25 since insurance said it was to soon even though it was a dose increase. Not a duplicate     Office:   [] PCP/Provider -   [x] Specialty/Provider - [facility]     Medication: lamoTRIgine (LaMICtal) 25 mg tab     Dose/Frequency: 75 mg, Daily     Quantity: 90    Pharmacy: [id number]    Does the patient have enough for 3 days?   [x] Yes   [] No - Send as HP to POD

## 2025-03-12 ENCOUNTER — SOCIAL WORK (OUTPATIENT)
Dept: BEHAVIORAL/MENTAL HEALTH CLINIC | Facility: CLINIC | Age: 20
End: 2025-03-12
Payer: COMMERCIAL

## 2025-03-12 DIAGNOSIS — F41.0 PANIC DISORDER: ICD-10-CM

## 2025-03-12 DIAGNOSIS — F31.81 BIPOLAR II DISORDER (HCC): ICD-10-CM

## 2025-03-12 DIAGNOSIS — F17.200 NICOTINE USE DISORDER: ICD-10-CM

## 2025-03-12 DIAGNOSIS — F40.10 SOCIAL ANXIETY DISORDER: Primary | ICD-10-CM

## 2025-03-12 PROCEDURE — 90834 PSYTX W PT 45 MINUTES: CPT | Performed by: COUNSELOR

## 2025-03-13 NOTE — PSYCH
"Behavioral Health Psychotherapy Progress Note    Psychotherapy Provided: Individual Psychotherapy     1. Social anxiety disorder        2. Panic disorder        3. Bipolar II disorder (HCC)        4. Nicotine use disorder            Goals addressed in session: Goal 1     DATA: Met with Bethanie who showed me her log when she was having trouble sleeping,so she journals. She reports having trouble turning her brain off at times. It was suggested that she get the book \"don't believe everything you think\" in order to help her understand that when she gets her thought process going, she creates a narrative that is not necessarily true. We also explored her relationship with her father where she believes he has yet to take responsibility for his behavior and absence when he was using heroin. He has yet to make ammends for his actions.     During this session, this clinician used the following therapeutic modalities: Client-centered Therapy    Substance Abuse was not addressed during this session. If the client is diagnosed with a co-occurring substance use disorder, please indicate any changes in the frequency or amount of use: Stage of change for addressing substance use diagnoses: No substance use/Not applicable    ASSESSMENT:  Bethanie Cronin presents with a Euthymic/ normal mood.     her affect is Normal range and intensity, which is congruent, with her mood and the content of the session. The client has made progress on their goals.     Bethanie Cronin presents with a none risk of suicide, none risk of self-harm, and none risk of harm to others.    For any risk assessment that surpasses a \"low\" rating, a safety plan must be developed.    A safety plan was indicated: no  If yes, describe in detail N/A    PLAN: Between sessions, Bethanie Cronin will continue to explore her thought process and develop coping skills to avoid over-thinking . At the next session, the therapist will use Client-centered Therapy to address " self care.    Behavioral Health Treatment Plan and Discharge Planning: Bethanie Cronin is aware of and agrees to continue to work on their treatment plan. They have identified and are working toward their discharge goals. yes    Depression Follow-up Plan Completed: No    Visit start and stop times:    03/13/25  Start Time: 1550  Stop Time: 1640  Total Visit Time: 50 minutes

## 2025-04-01 ENCOUNTER — TELEPHONE (OUTPATIENT)
Dept: PSYCHIATRY | Facility: CLINIC | Age: 20
End: 2025-04-01

## 2025-04-01 ENCOUNTER — TELEPHONE (OUTPATIENT)
Age: 20
End: 2025-04-01

## 2025-04-01 NOTE — TELEPHONE ENCOUNTER
Patient called and requested to cancel [employer] ride for appointment on 4/2. She reported that she did not request one.

## 2025-04-01 NOTE — TELEPHONE ENCOUNTER
Patients Name: Bethanie Cronin    : 2005    Phone Number: [phone]    Appointment Date: 25    Appointment time: 4pm     Address: [address] [place] [address]  [place] [address]    Drop Off Facility/Office: [facility]    Drop Off Address: [address], [place] [address]     3:20pm

## 2025-04-02 ENCOUNTER — SOCIAL WORK (OUTPATIENT)
Dept: BEHAVIORAL/MENTAL HEALTH CLINIC | Facility: CLINIC | Age: 20
End: 2025-04-02
Payer: COMMERCIAL

## 2025-04-02 DIAGNOSIS — R45.89 DEPRESSED AFFECT: ICD-10-CM

## 2025-04-02 DIAGNOSIS — F41.9 ANXIETY: Primary | ICD-10-CM

## 2025-04-02 PROCEDURE — 90837 PSYTX W PT 60 MINUTES: CPT | Performed by: COUNSELOR

## 2025-04-07 NOTE — PSYCH
"Behavioral Health Psychotherapy Progress Note    Psychotherapy Provided: Individual Psychotherapy     1. Anxiety        2. Depressed affect            Goals addressed in session: Goal 1     DATA: Met with Bethanie and processed her moving from her grandmothers home to her sisters because someone in her grandmothers building complained she was allowing others to reside in her apartment. He parents also moved out. She processed that she has her own space and finds it quiet and less intrusive. We continued to look at her stabilization where she continues to journal her feelings and thoughts   During this session, this clinician used the following therapeutic modalities: Client-centered Therapy    Substance Abuse was not addressed during this session. If the client is diagnosed with a co-occurring substance use disorder, please indicate any changes in the frequency or amount of use: . Stage of change for addressing substance use diagnoses: No substance use/Not applicable    ASSESSMENT:  Bethanie Cronin presents with a Euthymic/ normal mood.     her affect is Normal range and intensity, which is congruent, with her mood and the content of the session. The client has made progress on their goals.     Bethanie Cronin presents with a none risk of suicide, none risk of self-harm, and none risk of harm to others.    For any risk assessment that surpasses a \"low\" rating, a safety plan must be developed.    A safety plan was indicated: no  If yes, describe in detail N/A    PLAN: Between sessions, Bethanie Cronin will cont. To journal to assess her feelings and thoughts. At the next session, the therapist will use Client-centered Therapy to address self care.    Behavioral Health Treatment Plan and Discharge Planning: Bethanie Cronin is aware of and agrees to continue to work on their treatment plan. They have identified and are working toward their discharge goals. yes    Depression Follow-up Plan Completed: No    Visit start " and stop times:    04/07/25  Start Time: 1600  Stop Time: 1653  Total Visit Time: 53 minutes

## 2025-04-15 ENCOUNTER — TELEPHONE (OUTPATIENT)
Age: 20
End: 2025-04-15

## 2025-04-15 NOTE — TELEPHONE ENCOUNTER
Patient contacted the office to schedule a follow up visit with provider. Patient is now scheduled for 4/25/2025  at 9 am virtually with Dr Pérez.

## 2025-04-22 ENCOUNTER — SOCIAL WORK (OUTPATIENT)
Dept: BEHAVIORAL/MENTAL HEALTH CLINIC | Facility: CLINIC | Age: 20
End: 2025-04-22
Payer: COMMERCIAL

## 2025-04-22 DIAGNOSIS — Z91.52 HISTORY OF SELF MUTILATION: ICD-10-CM

## 2025-04-22 DIAGNOSIS — F41.9 ANXIETY: Primary | ICD-10-CM

## 2025-04-22 DIAGNOSIS — F33.3 SEVERE EPISODE OF RECURRENT MAJOR DEPRESSIVE DISORDER, WITH PSYCHOTIC FEATURES (HCC): ICD-10-CM

## 2025-04-22 PROCEDURE — 90834 PSYTX W PT 45 MINUTES: CPT | Performed by: COUNSELOR

## 2025-04-23 NOTE — PSYCH
"Behavioral Health Psychotherapy Progress Note    Psychotherapy Provided: Individual Psychotherapy     1. Anxiety        2. History of self mutilation        3. Severe episode of recurrent major depressive disorder, with psychotic features (HCC)            Goals addressed in session: Goal 1     DATA: Met with Bethanie and processed her dreaming and how she is using journaling to help with her feelings of loneliness along with having a friend group online. We also processed that at times she will turn to cutting as a way to distract her self from her feelings and fears. She reports that her parents while living together are not a couple and live together for financial support only. She is presently exploring that if her parents move that she would stay with her mother but not if her father    During this session, this clinician used the following therapeutic modalities: Client-centered Therapy    Substance Abuse was not addressed during this session. If the client is diagnosed with a co-occurring substance use disorder, please indicate any changes in the frequency or amount of use: Stage of change for addressing substance use diagnoses: No substance use/Not applicable    ASSESSMENT:  Bethanie Cronin presents with a Euthymic/ normal mood.     her affect is Normal range and intensity, which is congruent, with her mood and the content of the session. The client has made progress on their goals.     Bethanie Cronin presents with a none risk of suicide, none risk of self-harm, and none risk of harm to others.    For any risk assessment that surpasses a \"low\" rating, a safety plan must be developed.    A safety plan was indicated: no  If yes, describe in detail N/A    PLAN: Between sessions, Bethanie Cronin will cont. To journal and use healthy support. At the next session, the therapist will use Client-centered Therapy to address self care.    Behavioral Health Treatment Plan and Discharge Planning: Bethanie Cronin is " aware of and agrees to continue to work on their treatment plan. They have identified and are working toward their discharge goals. yes    Depression Follow-up Plan Completed: No    Visit start and stop times:    04/23/25  Start Time: 1600  Stop Time: 1645  Total Visit Time: 45 minutes

## 2025-04-25 ENCOUNTER — TELEPHONE (OUTPATIENT)
Age: 20
End: 2025-04-25

## 2025-04-25 ENCOUNTER — TELEPHONE (OUTPATIENT)
Dept: PSYCHIATRY | Facility: CLINIC | Age: 20
End: 2025-04-25

## 2025-05-05 ENCOUNTER — TELEPHONE (OUTPATIENT)
Age: 20
End: 2025-05-05

## 2025-05-05 NOTE — TELEPHONE ENCOUNTER
Patient is calling regarding cancelling an appointment.    Date/Time: 5/6/25 at 5pm    Reason: no reason given    Patient was rescheduled: YES [] NO [x]  If yes, when was Patient reschedule for: n/a    Patient requesting call back to reschedule: YES [] NO [x]  Provider notified

## 2025-05-08 DIAGNOSIS — F41.0 GENERALIZED ANXIETY DISORDER WITH PANIC ATTACKS: ICD-10-CM

## 2025-05-08 DIAGNOSIS — F41.1 GENERALIZED ANXIETY DISORDER WITH PANIC ATTACKS: ICD-10-CM

## 2025-05-08 RX ORDER — VENLAFAXINE HYDROCHLORIDE 75 MG/1
75 CAPSULE, EXTENDED RELEASE ORAL DAILY
Qty: 30 CAPSULE | Refills: 1 | Status: SHIPPED | OUTPATIENT
Start: 2025-05-08 | End: 2025-05-15 | Stop reason: SDUPTHER

## 2025-05-08 RX ORDER — VENLAFAXINE HYDROCHLORIDE 37.5 MG/1
37.5 CAPSULE, EXTENDED RELEASE ORAL DAILY
Qty: 30 CAPSULE | Refills: 1 | Status: SHIPPED | OUTPATIENT
Start: 2025-05-08 | End: 2025-05-15 | Stop reason: DRUGHIGH

## 2025-05-13 ENCOUNTER — SOCIAL WORK (OUTPATIENT)
Dept: BEHAVIORAL/MENTAL HEALTH CLINIC | Facility: CLINIC | Age: 20
End: 2025-05-13
Payer: COMMERCIAL

## 2025-05-13 DIAGNOSIS — F41.9 ANXIETY: Primary | ICD-10-CM

## 2025-05-13 DIAGNOSIS — R45.89 DEPRESSED AFFECT: ICD-10-CM

## 2025-05-13 DIAGNOSIS — F33.3 SEVERE EPISODE OF RECURRENT MAJOR DEPRESSIVE DISORDER, WITH PSYCHOTIC FEATURES (HCC): ICD-10-CM

## 2025-05-13 DIAGNOSIS — Z91.52 HISTORY OF SELF MUTILATION: ICD-10-CM

## 2025-05-13 PROCEDURE — 90834 PSYTX W PT 45 MINUTES: CPT | Performed by: COUNSELOR

## 2025-05-14 NOTE — PSYCH
"Behavioral Health Psychotherapy Progress Note    Psychotherapy Provided: Individual Psychotherapy     1. Anxiety        2. Depressed affect        3. History of self mutilation        4. Severe episode of recurrent major depressive disorder, with psychotic features (HCC)            Goals addressed in session: Goal 1     DATA: Met with Bethanie and her mother briefly who asked is we could set Bethanie moreno with ICM services if available. This has been discussed his past sessions with Bethanie as away to assist her with resources, such as looking for employment, and volunteer work. We also discussed her anxiety over how her father is known in the family as being a Fire St[employer], in that he will make an off colored comment in order to start a debate. Howeverm when Bethanie of no other member chooses to argue he will call them names. She has opted to isolating herself from him when joe is home  During this session, this clinician used the following therapeutic modalities: Client-centered Therapy    Substance Abuse was not addressed during this session. If the client is diagnosed with a co-occurring substance use disorder, please indicate any changes in the frequency or amount of use: Stage of change for addressing substance use diagnoses: No substance use/Not applicable    ASSESSMENT:  Bethanie Cronin presents with a Euthymic/ normal mood.     her affect is Normal range and intensity, which is congruent, with her mood and the content of the session. The client has made progress on their goals.    Bethanie Cronin presents with a none risk of suicide, none risk of self-harm, and none risk of harm to others.    For any risk assessment that surpasses a \"low\" rating, a safety plan must be developed.    A safety plan was indicated: no  If yes, describe in detail N/A    PLAN: Between sessions, Bethanie Cronin will cont. To seek employment and or volunteering. At the next session, the therapist will use Client-centered Therapy to " address self care.    Behavioral Health Treatment Plan and Discharge Planning: Bethanie Cronin is aware of and agrees to continue to work on their treatment plan. They have identified and are working toward their discharge goals. yes    Depression Follow-up Plan Completed: Yes    Visit start and stop times:    05/14/25  Start Time: 1600  Stop Time: 1645  Total Visit Time: 45 minutes

## 2025-05-15 ENCOUNTER — TELEMEDICINE (OUTPATIENT)
Dept: PSYCHIATRY | Facility: CLINIC | Age: 20
End: 2025-05-15
Payer: COMMERCIAL

## 2025-05-15 ENCOUNTER — TELEPHONE (OUTPATIENT)
Dept: PSYCHIATRY | Facility: CLINIC | Age: 20
End: 2025-05-15

## 2025-05-15 DIAGNOSIS — F41.1 GENERALIZED ANXIETY DISORDER WITH PANIC ATTACKS: ICD-10-CM

## 2025-05-15 DIAGNOSIS — F31.81 BIPOLAR 2 DISORDER (HCC): ICD-10-CM

## 2025-05-15 DIAGNOSIS — F41.0 GENERALIZED ANXIETY DISORDER WITH PANIC ATTACKS: ICD-10-CM

## 2025-05-15 PROCEDURE — 99214 OFFICE O/P EST MOD 30 MIN: CPT | Performed by: PSYCHIATRY & NEUROLOGY

## 2025-05-15 RX ORDER — LAMOTRIGINE 100 MG/1
100 TABLET ORAL DAILY
Qty: 14 TABLET | Refills: 0 | Status: SHIPPED | OUTPATIENT
Start: 2025-05-15 | End: 2025-05-29

## 2025-05-15 RX ORDER — VENLAFAXINE HYDROCHLORIDE 150 MG/1
150 CAPSULE, EXTENDED RELEASE ORAL DAILY
Qty: 30 CAPSULE | Refills: 2 | Status: SHIPPED | OUTPATIENT
Start: 2025-05-15

## 2025-05-15 RX ORDER — LAMOTRIGINE 150 MG/1
150 TABLET ORAL DAILY
Qty: 30 TABLET | Refills: 1 | Status: SHIPPED | OUTPATIENT
Start: 2025-05-29 | End: 2025-07-28

## 2025-05-15 NOTE — BH TREATMENT PLAN
TREATMENT PLAN (Medication Management Only)        [facility]    Name and Date of Birth:  Bethanie Cronin 19 y.o. 2005  MRN: [id number]  Date of Treatment Plan: May 15, 2025  Diagnosis/Diagnoses:    1. Generalized anxiety disorder with panic attacks    2. Bipolar 2 disorder (HCC)      Strengths/Personal Resources for Self-Care: supportive family, supportive friends, taking medications as prescribed, ability to adapt to life changes, ability to communicate needs, ability to communicate well, ability to listen, ability to reason, ability to understand psychiatric illness, average or above intelligence.  Area/Areas of need (in own words): anxiety, depression, mood instability  1. Long Term Goal:   continue improvement in mood.  Target Date:6 months - 11/15/2025  Person/Persons responsible for completion of goal: Bethanie  2.  Short Term Objective (s) - How will we reach this goal?:   A.  Provider new recommended medication/dosage changes and/or continue medication(s): continue current medications as prescribed.  B.  N/A.  C.  N/A.  Target Date:6 months - 11/15/2025  Person/Persons Responsible for Completion of Goal: Bethanie  Progress Towards Goals: continuing treatment  Treatment Modality: medication management every 2 months  Review due 180 days from date of this plan: 6 months - 11/15/2025  Expected length of service: ongoing treatment unless revised  My Physician/PA/NP and I have developed this plan together and I agree to work on the goals and objectives. I understand the treatment goals that were developed for my treatment.   Electronic Signatures: on file (unless signed below)    Madison Pérez MD 05/15/25

## 2025-05-15 NOTE — TELEPHONE ENCOUNTER
Please allow at least 7-10 business days for referrals to be processed.    Pt added to CM work queue. Please send a message to our CM Pool at [phone] if Pt contacts office in regards to a referral that is being processed.

## 2025-05-15 NOTE — TELEPHONE ENCOUNTER
----- Message from Chris SAVAGE sent at 5/15/2025  1:21 PM EDT -----  Regarding: ICM Services requested  Patient and her mother are requesting ICM services.

## 2025-05-15 NOTE — PSYCH
MEDICATION MANAGEMENT NOTE    Name: Bethanie Cronin      : 2005      MRN: [id number]  Encounter Provider: Madison Pérez MD  Encounter Date: 5/15/2025   Encounter department: [facility]    Insurance: Payor: [employer] / Plan: [employer] / Product Type: [facility] /      Reason for Visit:   Chief Complaint   Patient presents with    Virtual Regular Visit     :Administrative Statements   Encounter provider Madison Pérez MD    The Patient is located at Home and in the following state in which I hold an active license [place].    The patient was identified by name and date of birth. Bethanie Cronin was informed that this is a telemedicine visit and that the visit is being conducted through the Epic Embedded platform. She agrees to proceed..  My office door was closed. No one else was in the room.  She acknowledged consent and understanding of privacy and security of the video platform. The patient has agreed to participate and understands they can discontinue the visit at any time.    I have spent a total time of 20 minutes in caring for this patient on the day of the visit/encounter including Risks and benefits of tx options, Instructions for management, Patient and family education, Importance of tx compliance, Risk factor reductions, Impressions, Counseling / Coordination of care, Reviewing/placing orders in the medical record (including tests, medications, and/or procedures), and Obtaining or reviewing history  , not including the time spent for establishing the audio/video connection.   Assessment & Plan  Generalized anxiety disorder with panic attacks    Orders:    venlafaxine (EFFEXOR-XR) 150 mg 24 hr capsule; Take 1 capsule (150 mg total) by mouth daily    Bipolar 2 disorder (HCC)    Orders:    lamoTRIgine (LaMICtal) 100 mg tablet; Take 1 tablet (100 mg total) by mouth in the morning for 14 days.    lamoTRIgine (LaMICtal) 150  MG tablet; Take 1 tablet (150 mg total) by mouth in the morning. Do not start before May 29, 2025.    Generalized anxiety disorder with panic attacks         Bipolar 2 disorder (HCC)           Patient anxiety depression has again increased since last visit in the setting of stressors.  No SI or HI.  Plan is to increase the dose of Lamictal to 100 mg 1 tablet daily for next 2 weeks and subsequently increasing to 150 mg 1 tablet daily till she follows with me again in couple of months for management of mood stability, bipolar disorder and depression.  I will also increase the dose of Effexor XR to 150 mg 1 capsule daily for management of depression and anxiety.  She was again educated about her meds in detail and encouraged to call me if there is any concern and to call [facility], 911 or visit nearby ER in case of any emergency or having any SI or HI.  Patient verbalized understanding and agrees with the current plan.  She will follow-up with me in 2 months or sooner if needed.    Treatment Recommendations:    Educated about diagnosis and treatment modalities. Verbalizes understanding and agreement with the treatment plan.  Discussed self monitoring of symptoms, and symptom monitoring tools.  Discussed medications and if treatment adjustment was needed or desired.  Aware of 24 hour and weekend coverage for urgent situations accessed by calling [facility] main practice number  I am scheduling this patient out for greater than 3 months: No    Medications Risks/Benefits:      Risks, Benefits And Possible Side Effects Of Medications:    Risks, benefits, and possible side effects of medications explained to Bethanie and she (or legal representative) verbalizes understanding and agreement for treatment.  Risks of medications in pregnancy or becoming pregnant explained to Bethanie. She verbalizes understanding and agrees to discuss treatment if planning to become pregnant, or if she become  pregnant.    Controlled Medication Discussion:     Not applicable      History of Present Illness     Patient reports she feels she is slightly decompensated since last visit.  Feels depression has increased though denying any suicidal thoughts.  Reports though she has been struggling with increased anxiety and intrusive thoughts which makes her uncomfortable in nature.  Reports some of them are sexual in nature.  Though denies any intent or urge to act on that but stays with her for a long time.  She also reports occasionally will have intrusive thoughts about harming herself but denies any suicidal thoughts or any plan or intent to hurt herself or others.  Feels medication still has been helpful but effect has been gradually weaning off since she last saw me.  Reports she moved with her sister along with her family for some time given she had to affect her previous place.  Currently looking for a new place.  Though feels comfortable living with her sister at this time.  She reports sleep though has not been very good due to current living situation.  She also reports drinking red bull couple of times a day around 8 ounces each and encouraged to not to drink more than once a day and that to early part of the day rather than later.  Also brief sleep hygiene was provided in addition to recommendation about energy drinks.  She denies any side effects on the medication.  Denies any rashes since she last saw me on Lamictal.  Compliant with her psychiatric medications.  Agreeable for increasing the dose.  Denies any other medical or mental health concerns today.    Review Of Systems: A review of systems is obtained and is negative except for the pertinent positives listed in HPI/Subjective above.      Current Rating Scores:     Current PHQ-9   PHQ-2/9 Depression Screening           Current ELVIN-7     Areas of Improvement: reviewed in HPI/Subjective Section and reviewed in Assessment and Plan Section      Past Medical  History:   Diagnosis Date    Anemia     Anxiety     Depression     Frequent headaches 05/20/2021    GERD (gastroesophageal reflux disease)     Iron (Fe) deficiency anemia      History reviewed. No pertinent surgical history.  Allergies: No Known Allergies    Current Outpatient Medications   Medication Instructions    famotidine (PEPCID) 40 mg, Daily    lamoTRIgine (LAMICTAL) 100 mg, Oral, Daily    [START ON 5/29/2025] lamoTRIgine (LAMICTAL) 150 mg, Oral, Daily    simethicone (MYLICON) 80 mg, Oral, 3 times daily PRN    venlafaxine (EFFEXOR-XR) 150 mg, Oral, Daily        Substance Abuse History:    Tobacco, Alcohol and Drug Use History     Tobacco Use    Smoking status: Every Day     Current packs/day: 1.00     Types: Cigarettes    Smokeless tobacco: Never    Tobacco comments:     Dealing with a lot of anxiety. Refer to Behavioral Health   Vaping Use    Vaping status: Every Day    Substances: Nicotine (every day)   Substance Use Topics    Alcohol use: Never    Drug use: Never     Alcohol Use: Not At Risk (5/20/2021)    AUDIT-C     Frequency of Alcohol Consumption: Never       Social History:    Social History     Socioeconomic History    Marital status: Unknown     Spouse name: Not on file    Number of children: Not on file    Years of education: Not on file    Highest education level: Not on file   Occupational History    Not on file   Other Topics Concern    Not on file   Social History Narrative    Not on file        Family Psychiatric History:     Family History   Problem Relation Age of Onset    No Known Problems Mother     No Known Problems Father     Cancer Maternal Grandmother         ovarian cancer    Cancer Maternal Grandfather         prostate cancer       Medical History Reviewed by provider this encounter:  Tobacco  Allergies  Meds  Problems  Med Hx  Surg Hx  Fam Hx          Objective   There were no vitals taken for this visit.     Mental Status Evaluation:    Appearance age appropriate, casually  dressed   Behavior cooperative, calm   Speech normal rate, normal volume, normal pitch, spontaneous   Mood depressed, anxious   Affect Mildly constricted   Thought Processes organized, goal directed   Thought Content no overt delusions   Perceptual Disturbances: no auditory hallucinations, no visual hallucinations   Abnormal Thoughts  Risk Potential Suicidal ideation - None  Homicidal ideation - None  Potential for aggression - No   Orientation oriented to person, place, time/date, and situation   Memory recent and remote memory grossly intact   Consciousness alert and awake   Attention Span Concentration Span attention span and concentration are age appropriate   Intellect appears to be of average intelligence   Insight intact   Judgement intact   Muscle Strength and  Gait unable to assess today due to virtual visit   Motor activity unable to assess today due to virtual visit   Language no difficulty naming common objects, no difficulty repeating a phrase   Fund of Knowledge adequate knowledge of current events  adequate fund of knowledge regarding past history  adequate fund of knowledge regarding vocabulary        Laboratory Results: I have personally reviewed all pertinent laboratory/tests results    Most Recent Labs:   Lab Results   Component Value Date    WBC 10.12 10/17/2024    RBC 4.35 10/17/2024    HGB 9.8 (L) 10/17/2024    HCT 33.4 (L) 10/17/2024     10/17/2024    RDW 14.7 10/17/2024    NEUTROABS 6.88 10/17/2024    SODIUM 137 10/25/2024    K 3.4 (L) 10/25/2024     10/25/2024    CO2 24 10/25/2024    BUN 7 10/25/2024    CREATININE 0.56 (L) 10/25/2024    CALCIUM 9.7 10/25/2024    AST 21 10/17/2024    ALT 9 10/17/2024    ALKPHOS 56 10/17/2024    TP 7.8 10/17/2024    TBILI 0.85 10/17/2024    CHOLESTEROL 111 09/22/2022    TRIG 79 09/22/2022    HDL 47 (L) 09/22/2022    LDLCALC 48 09/22/2022    NONHDLC 64 09/22/2022    AWX1DDKTCMZF 1.419 09/22/2024       Suicide/Homicide Risk Assessment:     Risk of  Harm to Self:  The following ratings are based on assessment at the time of the interview  Demographic risk factors include: , age: young adult (15-24)  Historical Risk Factors include: history of depression, history of anxiety  Recent Specific Risk Factors include: diagnosis of depression  Protective Factors: no current suicidal ideation, access to mental health treatment, compliant with medications, compliant with mental health treatment, supportive father and mother, supportive family  Weapons: none and no firearms. The following steps have been taken to ensure weapons are properly secured: not applicable  Based on today's assessment, Bethanie presents the following risk of harm to self: minimal     Risk of Harm to Others:  The following ratings are based on assessment at the time of the interview  Based on today's assessment, Bethanie presents the following risk of harm to others: none     The following interventions are recommended: contracts for safety at present - agrees to go to ED if feeling unsafe, contracts for safety at present - agrees to call Crisis Intervention Service if feeling unsafe       Psychotherapy Provided:     Individual psychotherapy provided: Yes Medications, treatment progress and treatment plan reviewed with Bethanie.  Medication changes discussed with Bethanie.  Medication education provided to Bethanie.  Reassurance and supportive therapy provided.   Crisis/safety plan discussed with Bethanie.    Treatment Plan:    Completed and signed during the session: Yes - Treatment Plan done but not signed at time of office visit due to: Plan reviewed by video and verbal consent given due to virtual visit.    Goals: Progress towards Treatment Plan goals - Yes, progressing, as evidenced by subjective findings in HPI/Subjective Section and in Assessment and Plan Section    Depression Follow-up Plan Completed: No    Note Share:    This note was shared with patient.          Visit Time  Visit Start  "Time: 1:33 PM  Visit Stop Time: 1:53 PM  Total Visit Duration: 20 minutes    Portions of the record may have been created with voice recognition software. Occasional wrong word or \"sound a like\" substitutions may have occurred due to the inherent limitations of voice recognition software. Read the chart carefully and recognize, using context, where substitutions have occurred.    Madison Pérez MD 05/15/25  "

## 2025-05-15 NOTE — PROGRESS NOTES
Name: Bethanie Cronin      : 2005      MRN: [id number]  Encounter Provider: Stefani Price PA-C  Encounter Date: 2025   Encounter department: [facility] GASTROENTEROLOGY [place] [address]  :  Assessment & Plan  Rectal bleeding  Patient reports more frequent rectal bleeding over the last month, reports this has been occurring with nearly every bowel movement. Does report constipation and that she needs to strain with bowel movements.     - Patient had colonoscopy about one year ago but noted to have a lot of thick liquid stool throughout the colon, mucosa could not be well-visualized in a few areas  - No hemorrhoids noted on prior colonoscopy  - Discussed with patient and mother, will plan for repeat colonoscopy due to suboptimal prep on last colonoscopy and recent rectal bleeding  - Will plan for extended bowel prep    Orders:    Colonoscopy; Future    polyethylene glycol (GOLYTELY) 4000 mL solution; Take 4,000 mL by mouth once for 1 dose Take as instructed by office prior to procedure.    Epigastric pain  Patient reports postprandial epigastric pain particularly after eating chocolate, fatty foods, large meals. Reports intermittent vomiting that occurs at least once per week but denies nausea. Patient reports that she has also had upper abdominal bloating which she feels is related to her hiatal hernia. Last EGD as below.     - Will plan for EGD at time of colonoscopy as above  - Pending results of EGD, consider RUQ ultrasound for further evaluation    Orders:    EGD; Future    Constipation, unspecified constipation type  Patient reports that she has always had difficulty with constipation. Reports that she needs to strain with bowel movements and sometimes only moves her bowels once per week.     - Would recommend trial of Miralax once daily, could consider increasing to twice daily depending on response         History of Present Illness   Bethanie Cronin is a 19 y.o.  female who presents for follow up visit. Patient was previously noted to have positive SIBO test and reports that she finished course of antibiotics for this. She reports that bloating symptoms have improved but she still somewhat experiences bloating which she attributes to hiatal hernia. She reports postprandial epigastric pain that occurs particularly after eating chocolate, fatty foods, large portions of foods.  Reports that she needs to eat small meals.  Reports that she has had vomiting due to pain.  She denies heartburn, reflux, nausea, dysphagia.     Finished course of antibiotics. Weight has been stable. Bloating has improved somewhat but feels hernia is still causing issues.     Larger portions of food cause epigastric pain, keeps her up at night. Chocolate, fatty foods trigger it. Needs to eat small meals. Postprandial pain. No heartburn or reflux. No nausea, vomiting at least once per week from pain. No dysphagia, odynophagia. Inconsistent BMs always, more harder stools. Has to strain to use the bathroom. Moves her bowels only about once per week. Blood whenever she has a BM, both in the toilet bowl and when wiping. No known family history of colon cancer. No family history of IBD. No fever, chills. More bleeding over the last month or so. No regular NSAID use. Early satiety. No past abdominal surgeries.     HPI  History obtained from: patient and patient's parent  Review of Systems A complete review of systems is negative other than that noted above in the HPI.      Current Medications[1]  Objective   There were no vitals taken for this visit.    Physical Exam  Vitals reviewed.   Constitutional:       General: She is not in acute distress.     Appearance: Normal appearance.   HENT:      Head: Normocephalic and atraumatic.     Eyes:      Conjunctiva/sclera: Conjunctivae normal.       Cardiovascular:      Rate and Rhythm: Normal rate.   Pulmonary:      Effort: Pulmonary effort is normal. No respiratory  "distress.   Abdominal:      Palpations: Abdomen is soft.      Tenderness: There is no abdominal tenderness. There is no guarding or rebound.     Musculoskeletal:      Cervical back: Neck supple.     Skin:     General: Skin is warm and dry.     Neurological:      General: No focal deficit present.      Mental Status: She is alert.     Psychiatric:         Mood and Affect: Mood normal.         Behavior: Behavior normal.          Lab Results: I personally reviewed relevant lab results.       Results for orders placed during the hospital encounter of 02/19/24    EGD    Impression  2 cm hiatal hernia. About 2 cm semicircumferential broad columnar mucosal extension from the GE junction was biopsied  The stomach appeared normal. Performed random biopsy using biopsy forceps.  The duodenum appeared normal. Performed random biopsy using biopsy forceps.      RECOMMENDATION:  Await pathology results  Pantoprazole 40 mg daily            Zackary Gee MD      Pathology from EGD 2/19/24:  A. Esophagus, distal, biopsy:   - Markedly inflamed cardiac-type mucosa, negative for intestinal metaplasia.    - Separate fragments of unremarkable small intestinal mucosa, see Note.   - Immunostain for H.pylori is negative.      B. Stomach, biospy:   - Gastric oxyntic mucosa with no diagnostic abnormality.   - No H.pylori organisms identified on H&E-stained sections.      C. Small bowel, biopsy:   - Small intestinal mucosa with no diagnostic abnormality.   - Squamous mucosa with active esophagitis, suggestive of gastroesophageal reflux injury, see Note.   - Inflamed cardiac-type mucosa, negative for intestinal metaplasia.      Note: Specimen A (\"distal esophagus\") shows predominantly cardiac-type mucosa with separate fragments of small intestinal mucosa; specimen C (\"small bowel\") shows fragments of small intestinal, squamous, and cardiac-type mucosa. Some tissue fragments may have been placed in the wrong      Colonoscopy " 2/19/24:  IMPRESSION:  The cecum appeared normal.           RECOMMENDATION:    No further screening colonoscopies necessary     Not recommended at this time due to age (patient below screening age).  Plan for age-appropriate colorectal cancer screening.        Advised patient to take MiraLAX and stool softeners daily                [1]   Current Outpatient Medications   Medication Sig Dispense Refill    famotidine (PEPCID) 40 MG tablet Take 40 mg by mouth daily      lamoTRIgine (LaMICtal) 100 mg tablet Take 1 tablet (100 mg total) by mouth in the morning for 14 days. 14 tablet 0    [START ON 5/29/2025] lamoTRIgine (LaMICtal) 150 MG tablet Take 1 tablet (150 mg total) by mouth in the morning. Do not start before May 29, 2025. 30 tablet 1    simethicone (MYLICON) 80 mg chewable tablet Chew 1 tablet (80 mg total) 3 (three) times a day as needed for flatulence 30 tablet 2    venlafaxine (EFFEXOR-XR) 150 mg 24 hr capsule Take 1 capsule (150 mg total) by mouth daily 30 capsule 2     No current facility-administered medications for this visit.

## 2025-05-16 ENCOUNTER — TELEPHONE (OUTPATIENT)
Dept: GASTROENTEROLOGY | Facility: CLINIC | Age: 20
End: 2025-05-16

## 2025-05-16 ENCOUNTER — OFFICE VISIT (OUTPATIENT)
Dept: GASTROENTEROLOGY | Facility: CLINIC | Age: 20
End: 2025-05-16

## 2025-05-16 VITALS
WEIGHT: 120 LBS | HEART RATE: 84 BPM | BODY MASS INDEX: 22.66 KG/M2 | DIASTOLIC BLOOD PRESSURE: 72 MMHG | SYSTOLIC BLOOD PRESSURE: 104 MMHG | HEIGHT: 61 IN

## 2025-05-16 DIAGNOSIS — R10.13 EPIGASTRIC PAIN: ICD-10-CM

## 2025-05-16 DIAGNOSIS — K59.00 CONSTIPATION, UNSPECIFIED CONSTIPATION TYPE: ICD-10-CM

## 2025-05-16 DIAGNOSIS — K62.5 RECTAL BLEEDING: Primary | ICD-10-CM

## 2025-05-16 RX ORDER — SODIUM CHLORIDE, SODIUM LACTATE, POTASSIUM CHLORIDE, CALCIUM CHLORIDE 600; 310; 30; 20 MG/100ML; MG/100ML; MG/100ML; MG/100ML
125 INJECTION, SOLUTION INTRAVENOUS CONTINUOUS
OUTPATIENT
Start: 2025-05-16

## 2025-05-16 NOTE — TELEPHONE ENCOUNTER
Scheduled date of EGD/colonoscopy (as of today): 7/16/25  Physician performing EGD/colonoscopy: Dr Gee  Location of EGD[facility]colonoscopy[facility]  Desired bowel prep reviewed with patient: 2 day extended Golytely given at appt   Instructions reviewed with patient by: tereso  Clearances:  n/a

## 2025-05-21 ENCOUNTER — TELEPHONE (OUTPATIENT)
Age: 20
End: 2025-05-21

## 2025-05-21 NOTE — TELEPHONE ENCOUNTER
"  Currently taking Lamictal 75 mg daily (3 of 25 mg) -would like instructions on how to stop[ taking Lamictal.      \"I think I do not want to be on any mood stabilizers. I feel the risks outweigh the benefits when I read about all the side effects.\"     Nurse reviewed med is started at a low dose and increased slowly, monitoring for side effects.     Bethanie is insistent to be weaned off Lamictal. Currently has no side effects and does not see a benefit to continue.     Please advise.   "

## 2025-05-21 NOTE — TELEPHONE ENCOUNTER
Spoke to Bethanie and reviewed Dr. Pérez's message: If she is still taking lamictal 75 mg daily ( 3 tabs of 25 mg) then she decrease dose by 25 mg every 7 days starting with 50 mg x 1 week and then 25 mg x 1 week. Please advise I will have to decrease her effexor too at next visit given the risk of hypomania. She was started on lamictal as she was experiencing hypomanic episode while on effexor. Please let her know I am still concern about her being off the medication Lamictal given it helped her a lot so in case her mood swings return to call me and we can restart if she wishes. This mood stabilizer has risk compare to other mood stabilizers. She also tolerated the medication well.Thanks   -------------------------  She verbalized understanding.

## 2025-05-21 NOTE — TELEPHONE ENCOUNTER
Nurse spoke with Bethanie Cronin - reviewed response from clinician in detail. Bethanie Cronin verbalized understanding of same but, would still like to discontinue Lamictal.     Please advise.

## 2025-05-21 NOTE — TELEPHONE ENCOUNTER
Patient called in requesting to speak with their provider and discuss;    That they were thinking about coming of of the Lamicatal or weaning off of it.    Patient states that they are worried about side effects.    Patient states it has increased their paranoia even thinking about it.    The best telephone number to reach out on is [phone]

## 2025-05-22 ENCOUNTER — APPOINTMENT (EMERGENCY)
Dept: RADIOLOGY | Facility: HOSPITAL | Age: 20
End: 2025-05-22
Payer: COMMERCIAL

## 2025-05-22 ENCOUNTER — HOSPITAL ENCOUNTER (OUTPATIENT)
Facility: HOSPITAL | Age: 20
Discharge: HOME | End: 2025-05-23
Attending: EMERGENCY MEDICINE | Admitting: SURGERY
Payer: COMMERCIAL

## 2025-05-22 DIAGNOSIS — R10.10 PAIN OF UPPER ABDOMEN: Primary | ICD-10-CM

## 2025-05-22 DIAGNOSIS — K80.00 CALCULUS OF GALLBLADDER WITH ACUTE CHOLECYSTITIS WITHOUT OBSTRUCTION: ICD-10-CM

## 2025-05-22 PROBLEM — K81.9 CHOLECYSTITIS: Status: ACTIVE | Noted: 2025-05-22

## 2025-05-22 LAB
ALBUMIN SERPL BCG-MCNC: 4.5 G/DL (ref 3.5–5)
ALP SERPL-CCNC: 48 U/L (ref 34–104)
ALT SERPL W P-5'-P-CCNC: 10 U/L (ref 7–52)
ANION GAP SERPL CALCULATED.3IONS-SCNC: 7 MMOL/L (ref 4–13)
AST SERPL W P-5'-P-CCNC: 9 U/L (ref 13–39)
ATRIAL RATE: 74 BPM
BASOPHILS # BLD AUTO: 0.03 THOUSANDS/ÂΜL (ref 0–0.1)
BASOPHILS NFR BLD AUTO: 0 % (ref 0–1)
BILIRUB SERPL-MCNC: 0.66 MG/DL (ref 0.2–1)
BILIRUB UR QL STRIP: NEGATIVE
BUN SERPL-MCNC: 6 MG/DL (ref 5–25)
CALCIUM SERPL-MCNC: 9.1 MG/DL (ref 8.4–10.2)
CHLORIDE SERPL-SCNC: 107 MMOL/L (ref 96–108)
CLARITY UR: CLEAR
CO2 SERPL-SCNC: 24 MMOL/L (ref 21–32)
COLOR UR: NORMAL
CREAT SERPL-MCNC: 0.53 MG/DL (ref 0.6–1.3)
EOSINOPHIL # BLD AUTO: 0.08 THOUSAND/ÂΜL (ref 0–0.61)
EOSINOPHIL NFR BLD AUTO: 1 % (ref 0–6)
ERYTHROCYTE [DISTWIDTH] IN BLOOD BY AUTOMATED COUNT: 11.5 % (ref 11.6–15.1)
EXT PREGNANCY TEST URINE: NEGATIVE
EXT. CONTROL: NORMAL
GFR SERPL CREATININE-BSD FRML MDRD: 136 ML/MIN/1.73SQ M
GLUCOSE SERPL-MCNC: 113 MG/DL (ref 65–140)
GLUCOSE UR STRIP-MCNC: NEGATIVE MG/DL
HCT VFR BLD AUTO: 40.3 % (ref 34.8–46.1)
HGB BLD-MCNC: 12.9 G/DL (ref 11.5–15.4)
HGB UR QL STRIP.AUTO: NEGATIVE
IMM GRANULOCYTES # BLD AUTO: 0.02 THOUSAND/UL (ref 0–0.2)
IMM GRANULOCYTES NFR BLD AUTO: 0 % (ref 0–2)
KETONES UR STRIP-MCNC: NEGATIVE MG/DL
LEUKOCYTE ESTERASE UR QL STRIP: NEGATIVE
LIPASE SERPL-CCNC: 7 U/L (ref 11–82)
LYMPHOCYTES # BLD AUTO: 1.02 THOUSANDS/ÂΜL (ref 0.6–4.47)
LYMPHOCYTES NFR BLD AUTO: 12 % (ref 14–44)
MCH RBC QN AUTO: 29.6 PG (ref 26.8–34.3)
MCHC RBC AUTO-ENTMCNC: 32 G/DL (ref 31.4–37.4)
MCV RBC AUTO: 92 FL (ref 82–98)
MONOCYTES # BLD AUTO: 0.86 THOUSAND/ÂΜL (ref 0.17–1.22)
MONOCYTES NFR BLD AUTO: 10 % (ref 4–12)
NEUTROPHILS # BLD AUTO: 6.33 THOUSANDS/ÂΜL (ref 1.85–7.62)
NEUTS SEG NFR BLD AUTO: 77 % (ref 43–75)
NITRITE UR QL STRIP: NEGATIVE
NRBC BLD AUTO-RTO: 0 /100 WBCS
P AXIS: 22 DEGREES
PH UR STRIP.AUTO: 8 [PH]
PLATELET # BLD AUTO: 198 THOUSANDS/UL (ref 149–390)
PMV BLD AUTO: 10.6 FL (ref 8.9–12.7)
POTASSIUM SERPL-SCNC: 3.4 MMOL/L (ref 3.5–5.3)
PR INTERVAL: 136 MS
PROT SERPL-MCNC: 6.8 G/DL (ref 6.4–8.4)
PROT UR STRIP-MCNC: NEGATIVE MG/DL
QRS AXIS: 91 DEGREES
QRSD INTERVAL: 76 MS
QT INTERVAL: 362 MS
QTC INTERVAL: 402 MS
RBC # BLD AUTO: 4.36 MILLION/UL (ref 3.81–5.12)
SODIUM SERPL-SCNC: 138 MMOL/L (ref 135–147)
SP GR UR STRIP.AUTO: 1.01 (ref 1–1.03)
T WAVE AXIS: 84 DEGREES
UROBILINOGEN UR STRIP-ACNC: <2 MG/DL
VENTRICULAR RATE: 74 BPM
WBC # BLD AUTO: 8.34 THOUSAND/UL (ref 4.31–10.16)

## 2025-05-22 PROCEDURE — 96368 THER/DIAG CONCURRENT INF: CPT

## 2025-05-22 PROCEDURE — 76705 ECHO EXAM OF ABDOMEN: CPT

## 2025-05-22 PROCEDURE — 96361 HYDRATE IV INFUSION ADD-ON: CPT

## 2025-05-22 PROCEDURE — 85025 COMPLETE CBC W/AUTO DIFF WBC: CPT

## 2025-05-22 PROCEDURE — 96375 TX/PRO/DX INJ NEW DRUG ADDON: CPT

## 2025-05-22 PROCEDURE — 93010 ELECTROCARDIOGRAM REPORT: CPT | Performed by: STUDENT IN AN ORGANIZED HEALTH CARE EDUCATION/TRAINING PROGRAM

## 2025-05-22 PROCEDURE — 96365 THER/PROPH/DIAG IV INF INIT: CPT

## 2025-05-22 PROCEDURE — 99284 EMERGENCY DEPT VISIT MOD MDM: CPT

## 2025-05-22 PROCEDURE — 36415 COLL VENOUS BLD VENIPUNCTURE: CPT

## 2025-05-22 PROCEDURE — 80053 COMPREHEN METABOLIC PANEL: CPT

## 2025-05-22 PROCEDURE — 99285 EMERGENCY DEPT VISIT HI MDM: CPT | Performed by: EMERGENCY MEDICINE

## 2025-05-22 PROCEDURE — 96366 THER/PROPH/DIAG IV INF ADDON: CPT

## 2025-05-22 PROCEDURE — 81025 URINE PREGNANCY TEST: CPT

## 2025-05-22 PROCEDURE — 81003 URINALYSIS AUTO W/O SCOPE: CPT

## 2025-05-22 PROCEDURE — 83690 ASSAY OF LIPASE: CPT

## 2025-05-22 PROCEDURE — 93005 ELECTROCARDIOGRAM TRACING: CPT

## 2025-05-22 PROCEDURE — 74177 CT ABD & PELVIS W/CONTRAST: CPT

## 2025-05-22 RX ORDER — MAGNESIUM HYDROXIDE/ALUMINUM HYDROXICE/SIMETHICONE 120; 1200; 1200 MG/30ML; MG/30ML; MG/30ML
30 SUSPENSION ORAL ONCE
Status: COMPLETED | OUTPATIENT
Start: 2025-05-22 | End: 2025-05-22

## 2025-05-22 RX ORDER — ENOXAPARIN SODIUM 100 MG/ML
40 INJECTION SUBCUTANEOUS DAILY
Status: DISCONTINUED | OUTPATIENT
Start: 2025-05-22 | End: 2025-05-23 | Stop reason: HOSPADM

## 2025-05-22 RX ORDER — METRONIDAZOLE 500 MG/100ML
500 INJECTION, SOLUTION INTRAVENOUS 2 TIMES DAILY
Status: DISCONTINUED | OUTPATIENT
Start: 2025-05-22 | End: 2025-05-23 | Stop reason: HOSPADM

## 2025-05-22 RX ORDER — DROPERIDOL 2.5 MG/ML
0.62 INJECTION, SOLUTION INTRAMUSCULAR; INTRAVENOUS ONCE
Status: COMPLETED | OUTPATIENT
Start: 2025-05-22 | End: 2025-05-22

## 2025-05-22 RX ORDER — ONDANSETRON 2 MG/ML
4 INJECTION INTRAMUSCULAR; INTRAVENOUS EVERY 6 HOURS PRN
Status: DISCONTINUED | OUTPATIENT
Start: 2025-05-22 | End: 2025-05-23 | Stop reason: HOSPADM

## 2025-05-22 RX ORDER — KETOROLAC TROMETHAMINE 30 MG/ML
15 INJECTION, SOLUTION INTRAMUSCULAR; INTRAVENOUS ONCE
Status: COMPLETED | OUTPATIENT
Start: 2025-05-22 | End: 2025-05-22

## 2025-05-22 RX ORDER — ACETAMINOPHEN 325 MG/1
650 TABLET ORAL ONCE
Status: COMPLETED | OUTPATIENT
Start: 2025-05-22 | End: 2025-05-22

## 2025-05-22 RX ORDER — PANTOPRAZOLE SODIUM 40 MG/10ML
40 INJECTION, POWDER, LYOPHILIZED, FOR SOLUTION INTRAVENOUS ONCE
Status: COMPLETED | OUTPATIENT
Start: 2025-05-22 | End: 2025-05-22

## 2025-05-22 RX ORDER — OXYCODONE HYDROCHLORIDE 5 MG/1
5 TABLET ORAL EVERY 4 HOURS PRN
Status: DISCONTINUED | OUTPATIENT
Start: 2025-05-22 | End: 2025-05-23 | Stop reason: HOSPADM

## 2025-05-22 RX ORDER — SODIUM CHLORIDE, SODIUM LACTATE, POTASSIUM CHLORIDE, CALCIUM CHLORIDE 600; 310; 30; 20 MG/100ML; MG/100ML; MG/100ML; MG/100ML
125 INJECTION, SOLUTION INTRAVENOUS CONTINUOUS
Status: CANCELLED | OUTPATIENT
Start: 2025-05-22

## 2025-05-22 RX ORDER — ACETAMINOPHEN 325 MG/1
975 TABLET ORAL EVERY 8 HOURS SCHEDULED
Status: DISCONTINUED | OUTPATIENT
Start: 2025-05-22 | End: 2025-05-23 | Stop reason: HOSPADM

## 2025-05-22 RX ORDER — SODIUM CHLORIDE, SODIUM GLUCONATE, SODIUM ACETATE, POTASSIUM CHLORIDE, MAGNESIUM CHLORIDE, SODIUM PHOSPHATE, DIBASIC, AND POTASSIUM PHOSPHATE .53; .5; .37; .037; .03; .012; .00082 G/100ML; G/100ML; G/100ML; G/100ML; G/100ML; G/100ML; G/100ML
125 INJECTION, SOLUTION INTRAVENOUS CONTINUOUS
Status: DISCONTINUED | OUTPATIENT
Start: 2025-05-22 | End: 2025-05-23 | Stop reason: HOSPADM

## 2025-05-22 RX ORDER — HYDROMORPHONE HCL IN WATER/PF 6 MG/30 ML
0.2 PATIENT CONTROLLED ANALGESIA SYRINGE INTRAVENOUS
Status: DISCONTINUED | OUTPATIENT
Start: 2025-05-22 | End: 2025-05-23 | Stop reason: HOSPADM

## 2025-05-22 RX ADMIN — DROPERIDOL 0.62 MG: 2.5 INJECTION, SOLUTION INTRAMUSCULAR; INTRAVENOUS at 06:08

## 2025-05-22 RX ADMIN — KETOROLAC TROMETHAMINE 15 MG: 30 INJECTION, SOLUTION INTRAMUSCULAR; INTRAVENOUS at 09:56

## 2025-05-22 RX ADMIN — PANTOPRAZOLE SODIUM 40 MG: 40 INJECTION, POWDER, FOR SOLUTION INTRAVENOUS at 06:08

## 2025-05-22 RX ADMIN — METRONIDAZOLE 500 MG: 500 INJECTION, SOLUTION INTRAVENOUS at 21:37

## 2025-05-22 RX ADMIN — CEFTRIAXONE SODIUM 1000 MG: 10 INJECTION, POWDER, FOR SOLUTION INTRAVENOUS at 12:15

## 2025-05-22 RX ADMIN — SODIUM CHLORIDE, SODIUM GLUCONATE, SODIUM ACETATE, POTASSIUM CHLORIDE, MAGNESIUM CHLORIDE, SODIUM PHOSPHATE, DIBASIC, AND POTASSIUM PHOSPHATE 125 ML/HR: .53; .5; .37; .037; .03; .012; .00082 INJECTION, SOLUTION INTRAVENOUS at 18:34

## 2025-05-22 RX ADMIN — ACETAMINOPHEN 650 MG: 325 TABLET ORAL at 06:50

## 2025-05-22 RX ADMIN — SODIUM CHLORIDE, SODIUM GLUCONATE, SODIUM ACETATE, POTASSIUM CHLORIDE, MAGNESIUM CHLORIDE, SODIUM PHOSPHATE, DIBASIC, AND POTASSIUM PHOSPHATE 125 ML/HR: .53; .5; .37; .037; .03; .012; .00082 INJECTION, SOLUTION INTRAVENOUS at 12:13

## 2025-05-22 RX ADMIN — IOHEXOL 75 ML: 350 INJECTION, SOLUTION INTRAVENOUS at 08:18

## 2025-05-22 RX ADMIN — Medication 3 MG: at 21:31

## 2025-05-22 RX ADMIN — ALUMINUM HYDROXIDE, MAGNESIUM HYDROXIDE, AND SIMETHICONE 30 ML: 200; 200; 20 SUSPENSION ORAL at 06:08

## 2025-05-22 RX ADMIN — SODIUM CHLORIDE 1000 ML: 0.9 INJECTION, SOLUTION INTRAVENOUS at 06:21

## 2025-05-22 RX ADMIN — METRONIDAZOLE 500 MG: 500 INJECTION, SOLUTION INTRAVENOUS at 13:07

## 2025-05-22 NOTE — ASSESSMENT & PLAN NOTE
- Patient with suspected episodes of biliary colic and now acute on chronic cholecystitis.  - Admit to acute care surgery [facility] level of care.  - N.p.o. for anticipated operative management.  - IV fluids for hydration and resuscitation.  - Initiate IV antibiotics.  - Analgesics and antiemetics as needed.  - Recommend operative intervention this hospital encounter.

## 2025-05-22 NOTE — PLAN OF CARE
Problem: GASTROINTESTINAL - ADULT  Goal: Minimal or absence of nausea and/or vomiting  Description: INTERVENTIONS:  - Administer IV fluids if ordered to ensure adequate hydration  - Maintain NPO status until nausea and vomiting are resolved  - Nasogastric tube if ordered  - Administer ordered antiemetic medications as needed  - Provide nonpharmacologic comfort measures as appropriate  - Advance diet as tolerated, if ordered  - Consider nutrition services referral to assist patient with adequate nutrition and appropriate food choices  Outcome: Progressing  Goal: Maintains or returns to baseline bowel function  Description: INTERVENTIONS:  - Assess bowel function  - Encourage oral fluids to ensure adequate hydration  - Administer IV fluids if ordered to ensure adequate hydration  - Administer ordered medications as needed  - Encourage mobilization and activity  - Consider nutritional services referral to assist patient with adequate nutrition and appropriate food choices  Outcome: Progressing  Goal: Maintains adequate nutritional intake  Description: INTERVENTIONS:  - Monitor percentage of each meal consumed  - Identify factors contributing to decreased intake, treat as appropriate  - Assist with meals as needed  - Monitor I&O, weight, and lab values if indicated  - Obtain nutrition services referral as needed  Outcome: Progressing  Goal: Oral mucous membranes remain intact  Description: INTERVENTIONS  - Assess oral mucosa and hygiene practices  - Implement preventative oral hygiene regimen  - Implement oral medicated treatments as ordered  - Initiate Nutrition services referral as needed  Outcome: Progressing     Problem: PAIN - ADULT  Goal: Verbalizes/displays adequate comfort level or baseline comfort level  Description: Interventions:  - Encourage patient to monitor pain and request assistance  - Assess pain using appropriate pain scale  - Administer analgesics as ordered based on type and severity of pain and  evaluate response  - Implement non-pharmacological measures as appropriate and evaluate response  - Consider cultural and social influences on pain and pain management  - Notify physician/advanced practitioner if interventions unsuccessful or patient reports new pain  - Educate patient/family on pain management process including their role and importance of  reporting pain   - Provide non-pharmacologic/complimentary pain relief interventions  Outcome: Progressing     Problem: DISCHARGE PLANNING  Goal: Discharge to home or other facility with appropriate resources  Description: INTERVENTIONS:  - Identify barriers to discharge w/patient and caregiver  - Arrange for needed discharge resources and transportation as appropriate  - Identify discharge learning needs (meds, wound care, etc.)  - Arrange for interpretive services to assist at discharge as needed  - Refer to Case Management Department for coordinating discharge planning if the patient needs post-hospital services based on physician/advanced practitioner order or complex needs related to functional status, cognitive ability, or social support system  Outcome: Progressing

## 2025-05-22 NOTE — ED CARE HANDOFF
Emergency Department Sign Out Note        Sign out and transfer of care from [facility]. See Separate Emergency Department note.     The patient, Bethanie Cronin, was evaluated by the previous provider for chronic abd pain, wt loss over past 6 months. Follows with GI, had EGD last year showing hiatal hernia. Here tonight with pain similar to prior, not different. Has outpatient EGD & colonoscopy planned with GI.    Workup Completed:  Labs Reviewed   CBC AND DIFFERENTIAL - Abnormal       Result Value Ref Range Status    WBC 8.34  4.31 - 10.16 Thousand/uL Final    RBC 4.36  3.81 - 5.12 Million/uL Final    Hemoglobin 12.9  11.5 - 15.4 g/dL Final    Hematocrit 40.3  34.8 - 46.1 % Final    MCV 92  82 - 98 fL Final    MCH 29.6  26.8 - 34.3 pg Final    MCHC 32.0  31.4 - 37.4 g/dL Final    RDW 11.5 (*) 11.6 - 15.1 % Final    MPV 10.6  8.9 - 12.7 fL Final    Platelets 198  149 - 390 Thousands/uL Final    nRBC 0  /100 WBCs Final    Segmented % 77 (*) 43 - 75 % Final    Immature Grans % 0  0 - 2 % Final    Lymphocytes % 12 (*) 14 - 44 % Final    Monocytes % 10  4 - 12 % Final    Eosinophils Relative 1  0 - 6 % Final    Basophils Relative 0  0 - 1 % Final    Absolute Neutrophils 6.33  1.85 - 7.62 Thousands/µL Final    Absolute Immature Grans 0.02  0.00 - 0.20 Thousand/uL Final    Absolute Lymphocytes 1.02  0.60 - 4.47 Thousands/µL Final    Absolute Monocytes 0.86  0.17 - 1.22 Thousand/µL Final    Eosinophils Absolute 0.08  0.00 - 0.61 Thousand/µL Final    Basophils Absolute 0.03  0.00 - 0.10 Thousands/µL Final   COMPREHENSIVE METABOLIC PANEL - Abnormal    Sodium 138  135 - 147 mmol/L Final    Potassium 3.4 (*) 3.5 - 5.3 mmol/L Final    Chloride 107  96 - 108 mmol/L Final    CO2 24  21 - 32 mmol/L Final    ANION GAP 7  4 - 13 mmol/L Final    BUN 6  5 - 25 mg/dL Final    Creatinine 0.53 (*) 0.60 - 1.30 mg/dL Final    Comment: Standardized to IDMS reference method    Glucose 113  65 - 140 mg/dL Final    Comment: If the patient  is fasting, the ADA then defines impaired fasting glucose as > 100 mg/dL and diabetes as > or equal to 123 mg/dL.    Calcium 9.1  8.4 - 10.2 mg/dL Final    AST 9 (*) 13 - 39 U/L Final    ALT 10  7 - 52 U/L Final    Comment: Specimen collection should occur prior to Sulfasalazine administration due to the potential for falsely depressed results.     Alkaline Phosphatase 48  34 - 104 U/L Final    Total Protein 6.8  6.4 - 8.4 g/dL Final    Albumin 4.5  3.5 - 5.0 g/dL Final    Total Bilirubin 0.66  0.20 - 1.00 mg/dL Final    Comment: Use of this assay is not recommended for patients undergoing treatment with eltrombopag due to the potential for falsely elevated results.  N-acetyl-p-benzoquinone imine (metabolite of Acetaminophen) will generate erroneously low results in samples for patients that have taken an overdose of Acetaminophen.    eGFR 136  ml/min/1.73sq m Final    Narrative:     National Kidney Disease Foundation guidelines for Chronic Kidney Disease (CKD):     Stage 1 with normal or high GFR (GFR > 90 mL/min/1.73 square meters)    Stage 2 Mild CKD (GFR = 60-89 mL/min/1.73 square meters)    Stage 3A Moderate CKD (GFR = 45-59 mL/min/1.73 square meters)    Stage 3B Moderate CKD (GFR = 30-44 mL/min/1.73 square meters)    Stage 4 Severe CKD (GFR = 15-29 mL/min/1.73 square meters)    Stage 5 End Stage CKD (GFR <15 mL/min/1.73 square meters)  Note: GFR calculation is accurate only with a steady state creatinine   LIPASE - Abnormal    Lipase 7 (*) 11 - 82 u/L Final   POCT PREGNANCY, URINE - Normal    EXT Preg Test, Ur Negative  Negative Final    Control Valid  Valid Final   UA W REFLEX TO MICROSCOPIC WITH REFLEX TO CULTURE    Color, UA Light Yellow   Final    Clarity, UA Clear   Final    Specific Gravity, UA 1.015  1.003 - 1.030 Final    pH, UA 8.0  4.5, 5.0, 5.5, 6.0, 6.5, 7.0, 7.5, 8.0 Final    Leukocytes, UA Negative  Negative Final    Nitrite, UA Negative  Negative Final    Protein, UA Negative  Negative mg/dl  Final    Glucose, UA Negative  Negative mg/dl Final    Ketones, UA Negative  Negative mg/dl Final    Urobilinogen, UA <2.0  <2.0 mg/dl mg/dl Final    Bilirubin, UA Negative  Negative Final    Occult Blood, UA Negative  Negative Final         ED Course / Workup Pending (followup):  Plan is for follow up CT a/p, if no acute pathology, d/c home with GI f/u.      US right upper quadrant   Final Result      1.  Cholelithiasis including a nonmobile gallstone within the gallbladder neck with mild gallbladder wall thickening. Findings are suspicious for acute cholecystitis. Sonographic Ryan's sign could not be reliably evaluated due to patient recently    receiving pain medication. Findings can be further assessed utilizing nuclear HIDA scan as clinically warranted.   2.  Hyperechoic appearance of the liver parenchyma suggestive of mild steatosis. There is an echogenic focus within the right hepatic lobe measuring 5 mm without clear correlate on CT. This finding is nonspecific but may represent a small hemangioma.    Follow-up ultrasound is recommended in 3-6 months to confirm stability.      The study was marked in EPIC for immediate notification.         Workstation performed: [id number]         CT abdomen pelvis with contrast   Final Result      Distended gallbladder with wall thickening/pericholecystic fluid as well as small gallstones raising the possibility for cholecystitis. Recommend clinical correlation. Ultrasound may be performed for confirmation.      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 Recommend nonemergent pelvic ultrasound for further characterization.      The study was marked in EPIC for immediate notification.         Workstation performed: [id number]               No data recorded                          ED Course as of 05/22/25 1202   Thu May 22, 2025   0712 Patient reassessed, feeling comfortable, resting comfortably with mother.   9136  Discussed CT with Dr. West (radiology) GB distended, thick walled, (+) stone, ? Cholecystitis. Has incidental uterine anomally, outpatient u/s recommended   0933 Patient reassessed, still having some abdominal fullness, mild right upper abdominal tenderness, no tympany, no rigidity, no guarding, no CVA tenderness.  General surgery attending Dr. Cooper, contacted, case discussed with them, requested RUQ u/s (which I ordered) they will be to see and evaluate the patient   1148 Patient seen and evaluated by general surgery, MIYA Xiong, indicated to me they are going to admit her to their service, they would take care of further orders     Procedures  Medical Decision Making  Amount and/or Complexity of Data Reviewed  Labs: ordered.  Radiology: ordered.    Risk  OTC drugs.  Prescription drug management.  Decision regarding hospitalization.            Disposition  Final diagnoses:   Pain of upper abdomen   Calculus of gallbladder with acute cholecystitis without obstruction     Time reflects when diagnosis was documented in both MDM as applicable and the Disposition within this note       Time User Action Codes Description Comment    5/22/2025  9:37 AM Gurdeep Padron [R10.10] Pain of upper abdomen     5/22/2025 11:49 AM Gurdeep Padron [K80.00] Calculus of gallbladder with acute cholecystitis without obstruction           ED Disposition       ED Disposition   Admit    Condition   Stable    Date/Time   u May 22, 2025 11:49 AM    Comment   --             Follow-up Information    None       Patient's Medications   Discharge Prescriptions    No medications on file     No discharge procedures on file.       ED Provider  Electronically Signed by     Gurdeep Padron DO  05/22/25 3511
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
## 2025-05-23 ENCOUNTER — ANESTHESIA (OUTPATIENT)
Dept: PERIOP | Facility: HOSPITAL | Age: 20
End: 2025-05-23
Payer: COMMERCIAL

## 2025-05-23 ENCOUNTER — ANESTHESIA EVENT (OUTPATIENT)
Dept: PERIOP | Facility: HOSPITAL | Age: 20
End: 2025-05-23
Payer: COMMERCIAL

## 2025-05-23 VITALS
DIASTOLIC BLOOD PRESSURE: 63 MMHG | HEART RATE: 86 BPM | WEIGHT: 123.9 LBS | RESPIRATION RATE: 20 BRPM | OXYGEN SATURATION: 96 % | HEIGHT: 61 IN | SYSTOLIC BLOOD PRESSURE: 120 MMHG | TEMPERATURE: 98.3 F | BODY MASS INDEX: 23.39 KG/M2

## 2025-05-23 LAB
ALBUMIN SERPL BCG-MCNC: 3.7 G/DL (ref 3.5–5)
ALP SERPL-CCNC: 40 U/L (ref 34–104)
ALT SERPL W P-5'-P-CCNC: 13 U/L (ref 7–52)
ANION GAP SERPL CALCULATED.3IONS-SCNC: 7 MMOL/L (ref 4–13)
AST SERPL W P-5'-P-CCNC: 11 U/L (ref 13–39)
BASOPHILS # BLD AUTO: 0.03 THOUSANDS/ÂΜL (ref 0–0.1)
BASOPHILS NFR BLD AUTO: 0 % (ref 0–1)
BILIRUB SERPL-MCNC: 0.63 MG/DL (ref 0.2–1)
BUN SERPL-MCNC: 4 MG/DL (ref 5–25)
CALCIUM SERPL-MCNC: 8.5 MG/DL (ref 8.4–10.2)
CHLORIDE SERPL-SCNC: 110 MMOL/L (ref 96–108)
CO2 SERPL-SCNC: 22 MMOL/L (ref 21–32)
CREAT SERPL-MCNC: 0.41 MG/DL (ref 0.6–1.3)
EOSINOPHIL # BLD AUTO: 0.11 THOUSAND/ÂΜL (ref 0–0.61)
EOSINOPHIL NFR BLD AUTO: 2 % (ref 0–6)
ERYTHROCYTE [DISTWIDTH] IN BLOOD BY AUTOMATED COUNT: 11.6 % (ref 11.6–15.1)
GFR SERPL CREATININE-BSD FRML MDRD: 148 ML/MIN/1.73SQ M
GLUCOSE P FAST SERPL-MCNC: 87 MG/DL (ref 65–99)
GLUCOSE SERPL-MCNC: 87 MG/DL (ref 65–140)
HCT VFR BLD AUTO: 37.9 % (ref 34.8–46.1)
HGB BLD-MCNC: 12.2 G/DL (ref 11.5–15.4)
IMM GRANULOCYTES # BLD AUTO: 0.01 THOUSAND/UL (ref 0–0.2)
IMM GRANULOCYTES NFR BLD AUTO: 0 % (ref 0–2)
LYMPHOCYTES # BLD AUTO: 2.17 THOUSANDS/ÂΜL (ref 0.6–4.47)
LYMPHOCYTES NFR BLD AUTO: 32 % (ref 14–44)
MCH RBC QN AUTO: 29.8 PG (ref 26.8–34.3)
MCHC RBC AUTO-ENTMCNC: 32.2 G/DL (ref 31.4–37.4)
MCV RBC AUTO: 92 FL (ref 82–98)
MONOCYTES # BLD AUTO: 0.92 THOUSAND/ÂΜL (ref 0.17–1.22)
MONOCYTES NFR BLD AUTO: 13 % (ref 4–12)
NEUTROPHILS # BLD AUTO: 3.63 THOUSANDS/ÂΜL (ref 1.85–7.62)
NEUTS SEG NFR BLD AUTO: 53 % (ref 43–75)
NRBC BLD AUTO-RTO: 0 /100 WBCS
PLATELET # BLD AUTO: 187 THOUSANDS/UL (ref 149–390)
PMV BLD AUTO: 11 FL (ref 8.9–12.7)
POTASSIUM SERPL-SCNC: 3.4 MMOL/L (ref 3.5–5.3)
PROT SERPL-MCNC: 5.9 G/DL (ref 6.4–8.4)
RBC # BLD AUTO: 4.1 MILLION/UL (ref 3.81–5.12)
SODIUM SERPL-SCNC: 139 MMOL/L (ref 135–147)
WBC # BLD AUTO: 6.87 THOUSAND/UL (ref 4.31–10.16)

## 2025-05-23 PROCEDURE — 96366 THER/PROPH/DIAG IV INF ADDON: CPT

## 2025-05-23 PROCEDURE — 85025 COMPLETE CBC W/AUTO DIFF WBC: CPT

## 2025-05-23 PROCEDURE — 88304 TISSUE EXAM BY PATHOLOGIST: CPT | Performed by: STUDENT IN AN ORGANIZED HEALTH CARE EDUCATION/TRAINING PROGRAM

## 2025-05-23 PROCEDURE — 80053 COMPREHEN METABOLIC PANEL: CPT

## 2025-05-23 PROCEDURE — 99215 OFFICE O/P EST HI 40 MIN: CPT | Performed by: SURGERY

## 2025-05-23 PROCEDURE — 99024 POSTOP FOLLOW-UP VISIT: CPT | Performed by: SURGERY

## 2025-05-23 PROCEDURE — 47562 LAPAROSCOPIC CHOLECYSTECTOMY: CPT | Performed by: SURGERY

## 2025-05-23 RX ORDER — FENTANYL CITRATE/PF 50 MCG/ML
25 SYRINGE (ML) INJECTION
Status: DISCONTINUED | OUTPATIENT
Start: 2025-05-23 | End: 2025-05-23 | Stop reason: HOSPADM

## 2025-05-23 RX ORDER — ONDANSETRON 2 MG/ML
INJECTION INTRAMUSCULAR; INTRAVENOUS AS NEEDED
Status: DISCONTINUED | OUTPATIENT
Start: 2025-05-23 | End: 2025-05-23

## 2025-05-23 RX ORDER — BUPIVACAINE HYDROCHLORIDE 5 MG/ML
INJECTION, SOLUTION EPIDURAL; INTRACAUDAL; PERINEURAL AS NEEDED
Status: DISCONTINUED | OUTPATIENT
Start: 2025-05-23 | End: 2025-05-23 | Stop reason: HOSPADM

## 2025-05-23 RX ORDER — FENTANYL CITRATE 50 UG/ML
INJECTION, SOLUTION INTRAMUSCULAR; INTRAVENOUS AS NEEDED
Status: DISCONTINUED | OUTPATIENT
Start: 2025-05-23 | End: 2025-05-23

## 2025-05-23 RX ORDER — ROCURONIUM BROMIDE 10 MG/ML
INJECTION, SOLUTION INTRAVENOUS AS NEEDED
Status: DISCONTINUED | OUTPATIENT
Start: 2025-05-23 | End: 2025-05-23

## 2025-05-23 RX ORDER — MIDAZOLAM HYDROCHLORIDE 2 MG/2ML
INJECTION, SOLUTION INTRAMUSCULAR; INTRAVENOUS AS NEEDED
Status: DISCONTINUED | OUTPATIENT
Start: 2025-05-23 | End: 2025-05-23

## 2025-05-23 RX ORDER — PHENYLEPHRINE HCL IN 0.9% NACL 1 MG/10 ML
SYRINGE (ML) INTRAVENOUS AS NEEDED
Status: DISCONTINUED | OUTPATIENT
Start: 2025-05-23 | End: 2025-05-23

## 2025-05-23 RX ORDER — HYDROMORPHONE HYDROCHLORIDE 2 MG/ML
INJECTION, SOLUTION INTRAMUSCULAR; INTRAVENOUS; SUBCUTANEOUS AS NEEDED
Status: DISCONTINUED | OUTPATIENT
Start: 2025-05-23 | End: 2025-05-23

## 2025-05-23 RX ORDER — HYDROMORPHONE HCL IN WATER/PF 6 MG/30 ML
0.2 PATIENT CONTROLLED ANALGESIA SYRINGE INTRAVENOUS
Status: DISCONTINUED | OUTPATIENT
Start: 2025-05-23 | End: 2025-05-23 | Stop reason: HOSPADM

## 2025-05-23 RX ORDER — SODIUM CHLORIDE, SODIUM LACTATE, POTASSIUM CHLORIDE, CALCIUM CHLORIDE 600; 310; 30; 20 MG/100ML; MG/100ML; MG/100ML; MG/100ML
INJECTION, SOLUTION INTRAVENOUS CONTINUOUS PRN
Status: DISCONTINUED | OUTPATIENT
Start: 2025-05-23 | End: 2025-05-23

## 2025-05-23 RX ORDER — ONDANSETRON 2 MG/ML
4 INJECTION INTRAMUSCULAR; INTRAVENOUS ONCE AS NEEDED
Status: COMPLETED | OUTPATIENT
Start: 2025-05-23 | End: 2025-05-23

## 2025-05-23 RX ORDER — DEXAMETHASONE SODIUM PHOSPHATE 10 MG/ML
INJECTION, SOLUTION INTRAMUSCULAR; INTRAVENOUS AS NEEDED
Status: DISCONTINUED | OUTPATIENT
Start: 2025-05-23 | End: 2025-05-23

## 2025-05-23 RX ORDER — LIDOCAINE HYDROCHLORIDE 10 MG/ML
INJECTION, SOLUTION EPIDURAL; INFILTRATION; INTRACAUDAL; PERINEURAL AS NEEDED
Status: DISCONTINUED | OUTPATIENT
Start: 2025-05-23 | End: 2025-05-23

## 2025-05-23 RX ORDER — POTASSIUM CHLORIDE 1500 MG/1
40 TABLET, EXTENDED RELEASE ORAL ONCE
Status: COMPLETED | OUTPATIENT
Start: 2025-05-23 | End: 2025-05-23

## 2025-05-23 RX ORDER — PROPOFOL 10 MG/ML
INJECTION, EMULSION INTRAVENOUS AS NEEDED
Status: DISCONTINUED | OUTPATIENT
Start: 2025-05-23 | End: 2025-05-23

## 2025-05-23 RX ORDER — OXYCODONE HYDROCHLORIDE 5 MG/1
2.5 TABLET ORAL EVERY 6 HOURS PRN
Qty: 6 TABLET | Refills: 0 | Status: SHIPPED | OUTPATIENT
Start: 2025-05-23 | End: 2025-05-26

## 2025-05-23 RX ORDER — MAGNESIUM HYDROXIDE 1200 MG/15ML
LIQUID ORAL AS NEEDED
Status: DISCONTINUED | OUTPATIENT
Start: 2025-05-23 | End: 2025-05-23 | Stop reason: HOSPADM

## 2025-05-23 RX ADMIN — SODIUM CHLORIDE, SODIUM GLUCONATE, SODIUM ACETATE, POTASSIUM CHLORIDE, MAGNESIUM CHLORIDE, SODIUM PHOSPHATE, DIBASIC, AND POTASSIUM PHOSPHATE 125 ML/HR: .53; .5; .37; .037; .03; .012; .00082 INJECTION, SOLUTION INTRAVENOUS at 14:26

## 2025-05-23 RX ADMIN — SODIUM CHLORIDE, SODIUM LACTATE, POTASSIUM CHLORIDE, CALCIUM CHLORIDE: 600; 310; 30; 20 INJECTION, SOLUTION INTRAVENOUS at 11:51

## 2025-05-23 RX ADMIN — POTASSIUM CHLORIDE 40 MEQ: 1500 TABLET, EXTENDED RELEASE ORAL at 10:08

## 2025-05-23 RX ADMIN — FENTANYL CITRATE 50 MCG: 50 INJECTION, SOLUTION INTRAMUSCULAR; INTRAVENOUS at 12:23

## 2025-05-23 RX ADMIN — MIDAZOLAM HYDROCHLORIDE 2 MG: 2 INJECTION, SOLUTION INTRAMUSCULAR; INTRAVENOUS at 11:51

## 2025-05-23 RX ADMIN — DEXAMETHASONE SODIUM PHOSPHATE 10 MG: 10 INJECTION, SOLUTION INTRAMUSCULAR; INTRAVENOUS at 13:10

## 2025-05-23 RX ADMIN — SODIUM CHLORIDE, SODIUM LACTATE, POTASSIUM CHLORIDE, CALCIUM CHLORIDE: 600; 310; 30; 20 INJECTION, SOLUTION INTRAVENOUS at 12:49

## 2025-05-23 RX ADMIN — SODIUM CHLORIDE, SODIUM GLUCONATE, SODIUM ACETATE, POTASSIUM CHLORIDE, MAGNESIUM CHLORIDE, SODIUM PHOSPHATE, DIBASIC, AND POTASSIUM PHOSPHATE 125 ML/HR: .53; .5; .37; .037; .03; .012; .00082 INJECTION, SOLUTION INTRAVENOUS at 05:58

## 2025-05-23 RX ADMIN — ONDANSETRON 4 MG: 2 INJECTION INTRAMUSCULAR; INTRAVENOUS at 13:45

## 2025-05-23 RX ADMIN — ACETAMINOPHEN 975 MG: 325 TABLET ORAL at 14:33

## 2025-05-23 RX ADMIN — CEFTRIAXONE SODIUM 1000 MG: 10 INJECTION, POWDER, FOR SOLUTION INTRAVENOUS at 11:51

## 2025-05-23 RX ADMIN — FENTANYL CITRATE 25 MCG: 50 INJECTION INTRAMUSCULAR; INTRAVENOUS at 13:46

## 2025-05-23 RX ADMIN — PROPOFOL 160 MG: 10 INJECTION, EMULSION INTRAVENOUS at 11:57

## 2025-05-23 RX ADMIN — ONDANSETRON 4 MG: 2 INJECTION INTRAMUSCULAR; INTRAVENOUS at 13:10

## 2025-05-23 RX ADMIN — LIDOCAINE HYDROCHLORIDE 50 MG: 10 INJECTION, SOLUTION EPIDURAL; INFILTRATION; INTRACAUDAL; PERINEURAL at 11:57

## 2025-05-23 RX ADMIN — ROCURONIUM BROMIDE 50 MG: 10 INJECTION, SOLUTION INTRAVENOUS at 11:57

## 2025-05-23 RX ADMIN — Medication 150 MCG: at 13:04

## 2025-05-23 RX ADMIN — HYDROMORPHONE HYDROCHLORIDE 1 MG: 2 INJECTION, SOLUTION INTRAMUSCULAR; INTRAVENOUS; SUBCUTANEOUS at 13:34

## 2025-05-23 RX ADMIN — FENTANYL CITRATE 50 MCG: 50 INJECTION, SOLUTION INTRAMUSCULAR; INTRAVENOUS at 11:57

## 2025-05-23 RX ADMIN — METRONIDAZOLE 500 MG: 500 INJECTION, SOLUTION INTRAVENOUS at 08:18

## 2025-05-23 NOTE — OP NOTE
OPERATIVE REPORT  PATIENT NAME: Bethanie Cronin    :  2005  MRN: [id number]  Pt Location: [facility] OR ROOM 07    SURGERY DATE: 2025    Surgeons and Role:     * Paulino Flower DO - Primary  Kandi Hitchcock DO- assisting    Preop Diagnosis:  Calculus of gallbladder with acute cholecystitis without obstruction [K80.00]    Post-Op Diagnosis Codes:     * Calculus of gallbladder with acute cholecystitis without obstruction [K80.00]    Procedure(s):  CHOLECYSTECTOMY LAPAROSCOPIC; POSSIBLE OPEN    Specimen(s):  ID Type Source Tests Collected by Time Destination   1 : Gallbladder Tissue Gallbladder TISSUE EXAM Paulino Flower DO 2025 1233        Estimated Blood Loss:   Minimal    Drains:  none    Anesthesia Type:   General    Operative Indications:  Calculus of gallbladder with acute cholecystitis without obstruction [K80.00]      Operative Findings:  Acute cholecystitis        Complications:   None    Procedure and Technique:  The patient was taken to the operating room and placed on the OR table in the supine position with both arms out. After induction of anesthesia, she was prepped and draped in the normal sterile fashion. After a time out was performed, a punctate incision was made in the LUQ at chin's point with an 11 blade scalpel and the Veress needle was inserted uneventfully into the peritoneal cavity. The peritoneal cavity was insufflated with CO2 gas to a pressure of 15mmHg. Then, an 11mm incision was made in the supraumbilical region and an 11mm optiview trocar was placed into the peritoneal cavity.  The location of the veress needle was examined and there were confirmed to be no signs of injury from the veress needle insertion. The veress needle was then removed. Three 5mm ports were placed, one in the subxiphoid region and two in the RUQ under direct visualization. The patient was then placed in the reverse trendelenburg position with right side up.    Attention was then  turned to the gallbladder which was clearly visualized. The gallbladder was retracted superiorly and laterally at the fundus and infundibulum respectively. The gallbladder was freed from it's peritoneal attachments laterally first and then carried medially meticulously using bovie electrocautery. The triangle of calot was carefully opened up and the cystic duct and artery were carefully dissected circumferentially until the critical view of safety was obtained with only two structures visualized entering the gallbladder. Using a clip applier, two clips were placed on the cystic duct distally and one was placed proximally. Laparoscopic scissors were then used to ligate the duct between the clips. Using the clip applier once again, two clips were placed on the proximal cystic artery and one distally, the cystic artery was then ligated using laparoscopic scissors. The gallbladder was then dissected free from the liver bed using bovie electrocautery. It was placed in a laparoscopic bag and removed from the field.    The liver bed appeared hemostatic at the end of the case. The counts were correct. The fascia at the supraumbilical incision was closed using vicryl on a UR-6. The skin was then closed with monocryl and skin glue.     Dr. Flower was present and scrubbed for the entire case.    Patient Disposition:  PACU          SIGNATURE: Kandi Hitchcock DO  DATE: May 23, 2025  TIME: 2:04 PM

## 2025-05-23 NOTE — ANESTHESIA PREPROCEDURE EVALUATION
Procedure:  CHOLECYSTECTOMY LAPAROSCOPIC; POSSIBLE OPEN (Abdomen)    Relevant Problems   CARDIO   (+) Migraine without aura and without status migrainosus, not intractable      HEMATOLOGY   (+) Anemia      NEURO/PSYCH   (+) Anxiety   (+) Generalized anxiety disorder with panic attacks   (+) Major depressive disorder, recurrent severe without psychotic features (HCC)   (+) Major depressive disorder, recurrent, moderate (HCC)   (+) Migraine without aura and without status migrainosus, not intractable   (+) Panic disorder   (+) Severe episode of recurrent major depressive disorder, with psychotic features (HCC)   (+) Social anxiety disorder        Physical Exam    Airway     Mallampati score: II  TM Distance: >3 FB  Neck ROM: full      Cardiovascular      Dental        Pulmonary      Neurological    She appears awake, alert and oriented x3.      Other Findings  post-pubertal.      Anesthesia Plan  ASA Score- 2     Anesthesia Type- general with ASA Monitors.         Additional Monitors:     Airway Plan: Oral ETT.           Plan Factors-Exercise tolerance (METS): >4 METS.    Chart reviewed.                      Induction- intravenous.    Postoperative Plan- .   Monitoring Plan - Monitoring plan - standard ASA monitoring  Post Operative Pain Plan - multimodal analgesia    Perioperative Resuscitation Plan - Level 1 - Full Code.       Informed Consent- Anesthetic plan and risks discussed with patient.  I personally reviewed this patient with the CRNA. Discussed and agreed on the Anesthesia Plan with the CRNA..      NPO Status:  Vitals Value Taken Time   Date of last liquid 05/23/25 05/23/25 11:13   Time of last liquid 0800 05/23/25 11:13   Date of last solid 05/22/25 05/23/25 11:13   Time of last solid 2100 05/23/25 11:13          Thank you for taking care of this for me- AA

## 2025-05-23 NOTE — ASSESSMENT & PLAN NOTE
20 yo female with abdominal pain in the setting of acute cholecystitis    Plan  NPO  IV fluids  IV abx  PRN pain and anti nausea meds  DVT prophylaxis  Plan for lap choley today

## 2025-05-23 NOTE — DISCHARGE INSTR - AVS FIRST PAGE
Follow-up  Please follow up as scheduled on 6/5 at 9am. If this time does not work for you please call the office to reschedule to be seen 2 weeks following discharge [phone]  Make an appointment to follow up with your primary care physician in the next 1-2 weeks to review hospitalization and incidental findings.  Call the office if you have increased pain not relieved with pain medicine.  Call the office if you have a fever,redness, the wound opens up, you have pus draining from your incision.     Activity:  No lifting greater than 10 pounds or strenuous physical activity or exercise for 2 weeks.  Walking and normal light activities are encouraged.  Normal daily activities including climbing steps are okay.  No driving until no longer using pain medications.    Wound care  You have glue over your incision. No dressing is needed. Please monitor incision for increasing redness, drainage, or foul odor from incision.    Shower  You may shower and get incisions wet, wash with soap and water, do not scrub, rinse, and pat dry.   No baths, swimming pools, or hot tubs, until you follow up in Outpatient Surgery Office    Medication  If you do not need strong pain medicine you may take Tylenol.   Do not take acetaminophen (Tylenol) WITH  pain meds that contain acetaminophen. Take one or the other.  Do not exceed more than 4000 mg of acetaminophen in 24 hours or 3000 mg if you have liver disease.   Please refer to AVS for further information regarding medications on discharge     Make sure to obtain appropriate incidental finding follow up imaging as discussed.

## 2025-05-23 NOTE — PROGRESS NOTES
Progress Note - Surgery-General   Name: Bethanie Cronin 19 y.o. female I MRN: [id number]  Unit/Bed#: -01 I Date of Admission: 5/22/2025   Date of Service: 5/23/2025 I Hospital Day: 0    Assessment & Plan  Cholecystitis  20 yo female with abdominal pain in the setting of acute cholecystitis    Plan  NPO  IV fluids  IV abx  PRN pain and anti nausea meds  DVT prophylaxis  Plan for lap choley today          Subjective   No acute events overnight. Patient denies having nausea, vomiting, fevers, chills, chest pain, shortness of breath. NPO. Voiding without difficulty. No bowel movements but passing flatus. Pain well controlled.    Objective :  Temp:  [98 °F (36.7 °C)-98.2 °F (36.8 °C)] 98.2 °F (36.8 °C)  HR:  [56-90] 74  BP: ()/(51-70) 105/59  Resp:  [13-17] 16  SpO2:  [96 %-99 %] 98 %  O2 Device: None (Room air)    I/O         05/21 0701  05/22 0700 05/22 0701  05/23 0700    P.O.  120    Total Intake(mL/kg)  120 (2.1)    Net  +120                  Physical Exam    Physical Exam:  General: No acute distress, alert and oriented  Neuro: alert, oriented x3  HENT: PERRL, EOMI  CV: Well perfused, regular rate and rhythm  Lungs: Normal work of breathing, no increased respiratory effort  Abdomen: Soft, non-tender, non-distended.  MSK/Extremities: No edema, clubbing or cyanosis  Skin: Warm, dry        Lab Results: I have reviewed the following results:  Recent Labs     05/22/25  0608   WBC 8.34   HGB 12.9   HCT 40.3      SODIUM 138   K 3.4*      CO2 24   BUN 6   CREATININE 0.53*   GLUC 113   AST 9*   ALT 10   ALB 4.5   TBILI 0.66   ALKPHOS 48           VTE Pharmacologic Prophylaxis: Enoxaparin (Lovenox)  VTE Mechanical Prophylaxis: sequential compression device

## 2025-05-23 NOTE — CASE MANAGEMENT
Case Management Assessment & Discharge Planning Note    Patient name Bethanie Cronin  Location /-01 MRN [id number]  : 2005 Date 2025       Current Admission Date: 2025  Current Admission Diagnosis:Cholecystitis   Patient Active Problem List    Diagnosis Date Noted    Cholecystitis 2025    Bloating 01/10/2025    Major depressive disorder, recurrent severe without psychotic features (HCC) 10/25/2024    Cyanocobalamin deficiency 10/09/2024    Generalized anxiety disorder with panic attacks 2024    Major depressive disorder, recurrent, moderate (HCC) 2024    Migraine without aura and without status migrainosus, not intractable 2024    Nausea and vomiting 2023    Other constipation 2023    Bipolar II disorder (HCC) 2022    Nicotine use disorder 2022    Severe episode of recurrent major depressive disorder, with psychotic features (HCC) 2021    Social anxiety disorder 2021    Panic disorder 2021    Dental decay 2021    Glossitis 2021    History of self mutilation 2021    Insomnia 2016    Abnormal result on screening urine test 2015    Depressed affect 2015    Anemia 2015    Weight loss, abnormal 2015    Anxiety 2014    Patent foramen ovale 2014      LOS (days): 0  Geometric Mean LOS (GMLOS) (days):   Days to GMLOS:     OBJECTIVE:              Current admission status: Outpatient Procedure       Preferred Pharmacy:   [id number] - [place], [place]  [address]  [place] [address]  Phone: [phone] Fax: [phone]    Primary Care Provider: MANNY Johnson    Primary Insurance: [facility]  Secondary Insurance:     ASSESSMENT:  Active Health Care Proxies    There are no active Health Care Proxies on file.       Advance Directives  Does patient have a Health Care POA?: No  Does patient have Advance Directives?:  No  Primary Contact: parents              Patient Information  Admitted from:: Home  Mental Status: Alert  During Assessment patient was accompanied by: Parent  Assessment information provided by:: Patient  Primary Caregiver: Self  Support Systems: Parent, Family members  What city do you live in[place]  Home entry access options. Select all that apply.: Stairs  Number of steps to enter home.: 6  Do the steps have railings?: Yes  Type of Current Residence: Apartment  Floor Level: 2  Upon entering residence, is there a bedroom on the main floor (no further steps)?: No  A bedroom is located on the following floor levels of residence (select all that apply):: 2nd Floor  Upon entering residence, is there a bathroom on the main floor (no further steps)?: Yes  Number of steps to 2nd floor from main floor: 6  Living Arrangements: Lives w/ Parent(s), Lives w/ Family members    Activities of Daily Living Prior to Admission  Functional Status: Independent  Completes ADLs independently?: Yes  Ambulates independently?: Yes  Does patient use assisted devices?: No  Does patient currently own DME?: No  Does patient have a history of Outpatient Therapy (PT/OT)?: No  Does the patient have a history of Short-Term Rehab?: No  Does patient have a history of HHC?: No         Patient Information Continued  Income Source: SSI/SSD  Does patient have prescription coverage?: Yes  Can the patient afford their medications and any related supplies (such as glucometers or test strips)?: N/A  Does patient receive dialysis treatments?: No  Does patient have a history of substance abuse?: No  Does patient have a history of Mental Health Diagnosis?: Yes (bipolar)  Is patient receiving treatment for mental health?: Yes  Has patient received inpatient treatment related to mental health in the last 2 years?: No         Means of Transportation  Means of Transport to Appts:: Family transport          DISCHARGE DETAILS:    Discharge planning discussed  with:: pt  Freedom of Choice: Yes                   Contacts  Patient Contacts: mother Isa Pino  Relationship to Patient:: Family  Contact Method: Phone  Phone Number: [phone]  Reason/Outcome: Continuity of Care, Emergency Contact, Discharge Planning                                                                     Additional Comments: resides with parent, I PTA

## 2025-05-23 NOTE — DISCHARGE SUMMARY
Discharge Summary - Surgery-General   Name: Bethanie Cronin 19 y.o. female I MRN: [id number]  Unit/Bed#: -01 I Date of Admission: 5/22/2025   Date of Service: 5/23/2025 I Hospital Day: 0    Admission Date: 5/22/2025 0523  Discharge Date: 05/23/25  Admitting Diagnosis: Calculus of gallbladder with acute cholecystitis without obstruction [K80.00]  Pain of upper abdomen [R10.10]  Discharge Diagnosis:   Medical Problems       Resolved Problems  Date Reviewed: 5/23/2025   None         HPI: Bethanie Cronin is a 19 y.o. female who presents with severe upper abdominal pain.  She notes that she has been having intermittent upper abdominal pain primarily after eating for the last several months associated with weight loss.  She notes that her most recent episode occurred beginning this morning with severe pain causing her to double over and bring her to tears.  She had associated nausea and vomiting at times with these episodes.  She denies any fevers or chills.  She denies any change in her bowel habits or bladder habits.     At present, she notes her pain is pretty much resolved and she is just tired.    Procedures Performed: See below    Summary of Hospital Course: Patient was admitted on 5/22 and underwent laparoscopic cholecystectomy on 5/23. There were no intraoperative complications. Post operatively the patient did well. Post operatively the patient was tolerating a PO diet, pain was well controlled, she was able to void without difficulty and was out of bed and ambulating without difficulty. The patient was discharged in a hemodynamically stable condition. She was instructed to follow up with the general surgery team within the next 2 weeks. All additional questions and concerns were appropriately addressed.     Incidental findings included: Congenital uterine anomaly with possible septate uterus incompletely characterized by CT.     Recommend nonemergent pelvic ultrasound.     Hyperechoic appearance of the  liver parenchyma suggestive of mild steatosis. There is an echogenic focus within the right hepatic lobe measuring 5 mm without clear correlate on CT.  May represent small hemangioma.     Will need follow up ultrasound in the next 3-6 months.    Patient is aware of the above incidental findings and time frame for follow up and stated she understands the findings and what to do next.     Significant Findings, Care, Treatment and Services Provided: See above    Complications: None    Condition at Discharge: good       Discharge instructions/Information to patient and family:   See After Visit Summary (AVS) for information provided to patient and family.      Provisions for Follow-Up Care:  See after visit summary for information related to follow-up care and any pertinent home health orders.      PCP: MANNY Johnson    Disposition: See After Visit Summary for discharge disposition information.    Planned Readmission: No     Discharge Medications:  See after visit summary for reconciled discharge medications provided to patient and family.      Discharge Statement:  I have spent a total time of 30 minutes in caring for this patient on the day of the visit/encounter. >30 minutes of time was spent on: Instructions for management, Patient and family education, Counseling / Coordination of care, and Documenting in the medical record.

## 2025-05-23 NOTE — INCIDENTAL FINDINGS
The following findings require follow up:      Incidental findings included: Congenital uterine anomaly with possible septate uterus incompletely characterized by CT.      Recommend nonemergent pelvic ultrasound.      Hyperechoic appearance of the liver parenchyma suggestive of mild steatosis. There is an echogenic focus within the right hepatic lobe measuring 5 mm without clear correlate on CT.  May represent small hemangioma.      Will need follow up ultrasound in the next 3-6 months.     Patient is aware of the above incidental findings and time frame for follow up and stated she understands the findings and what to do next.     Please notify the following clinician to assist with the follow up:   Dr. Flower and Dr. Qureshi    Incidental finding results were discussed with the Patient and Patient's POA by Jimy Qureshi MD on 05/23/25.   They expressed understanding and all questions answered.

## 2025-05-23 NOTE — ANESTHESIA POSTPROCEDURE EVALUATION
Post-Op Assessment Note    CV Status:  Stable  Pain Score: 0    Pain management: adequate       Mental Status:  Alert and awake   Hydration Status:  Euvolemic   PONV Controlled:  Controlled   Airway Patency:  Patent     Post Op Vitals Reviewed: Yes    No anethesia notable event occurred.    Staff: Anesthesiologist, CRNA           Last Filed PACU Vitals:  Vitals Value Taken Time   Temp 97    Pulse 94    /86    Resp 12    SpO2 97

## 2025-05-28 ENCOUNTER — SOCIAL WORK (OUTPATIENT)
Dept: BEHAVIORAL/MENTAL HEALTH CLINIC | Facility: CLINIC | Age: 20
End: 2025-05-28
Payer: COMMERCIAL

## 2025-05-28 DIAGNOSIS — F40.10 SOCIAL ANXIETY DISORDER: ICD-10-CM

## 2025-05-28 DIAGNOSIS — R45.89 DEPRESSED AFFECT: ICD-10-CM

## 2025-05-28 DIAGNOSIS — F41.9 ANXIETY: Primary | ICD-10-CM

## 2025-05-28 PROCEDURE — 90834 PSYTX W PT 45 MINUTES: CPT | Performed by: COUNSELOR

## 2025-05-29 PROCEDURE — 88304 TISSUE EXAM BY PATHOLOGIST: CPT | Performed by: STUDENT IN AN ORGANIZED HEALTH CARE EDUCATION/TRAINING PROGRAM

## 2025-05-29 NOTE — ANESTHESIA POSTPROCEDURE EVALUATION
Post-Op Assessment Note    CV Status:  Stable  Pain Score: 0    Pain management: adequate    Multimodal analgesia used between 6 hours prior to anesthesia start to PACU discharge    Mental Status:  Alert   Hydration Status:  Stable   PONV Controlled:  Controlled   Airway Patency:  Patent  Two or more mitigation strategies used for obstructive sleep apnea   Post Op Vitals Reviewed: Yes    No anethesia notable event occurred.    Staff: Anesthesiologist           Last Filed PACU Vitals:  Vitals Value Taken Time   Temp 97 °F (36.1 °C) 05/23/25 13:45   Pulse 80 05/23/25 14:09   /75 05/23/25 14:06   Resp 20 05/23/25 14:15   SpO2 98 % 05/23/25 14:15   Vitals shown include unfiled device data.    Modified Isabel:     Vitals Value Taken Time   Activity 2 05/23/25 14:15   Respiration 2 05/23/25 14:15   Circulation 2 05/23/25 14:15   Consciousness 2 05/23/25 14:15   Oxygen Saturation 2 05/23/25 14:15     Modified Isabel Score: 10

## 2025-06-02 NOTE — PSYCH
"Behavioral Health Psychotherapy Progress Note    Psychotherapy Provided: Individual Psychotherapy     1. Anxiety        2. Depressed affect        3. Social anxiety disorder            Goals addressed in session: Goal 1     DATA: Spoke with Bethanie who reports that she spent a day in the hospital due to abdominal pain. She had Gallbladder Sx which has allowed her great relief with not having to limit what she eats or drinks. We explored her getting out more to address her social anxiety where she is willing to go look at either getting a part time job or volunteering   During this session, this clinician used the following therapeutic modalities: Client-centered Therapy    Substance Abuse was not addressed during this session. If the client is diagnosed with a co-occurring substance use disorder, please indicate any changes in the frequency or amount of use:  Stage of change for addressing substance use diagnoses: No substance use/Not applicable    ASSESSMENT:  Bethanie Cronin presents with a Euthymic/ normal mood.     her affect is Normal range and intensity, which is congruent, with her mood and the content of the session. The client has made progress on their goals.     Bethanie Cronin presents with a none risk of suicide, none risk of self-harm, and none risk of harm to others.    For any risk assessment that surpasses a \"low\" rating, a safety plan must be developed.    A safety plan was indicated: no  If yes, describe in detail N/A    PLAN: Between sessions, Bethanie Cronin will begin to explore part time employment in her area or volunteering. At the next session, the therapist will use Client-centered Therapy to address self care.    Behavioral Health Treatment Plan and Discharge Planning: Bethanie Cronin is aware of and agrees to continue to work on their treatment plan. They have identified and are working toward their discharge goals. yes    Depression Follow-up Plan Completed: No    Visit start and " stop times:    06/02/25  Start Time: 1600  Stop Time: 1645  Total Visit Time: 45 minutes

## 2025-06-18 ENCOUNTER — SOCIAL WORK (OUTPATIENT)
Dept: BEHAVIORAL/MENTAL HEALTH CLINIC | Facility: CLINIC | Age: 20
End: 2025-06-18
Payer: COMMERCIAL

## 2025-06-18 DIAGNOSIS — F31.81 BIPOLAR II DISORDER (HCC): ICD-10-CM

## 2025-06-18 DIAGNOSIS — F40.10 SOCIAL ANXIETY DISORDER: ICD-10-CM

## 2025-06-18 DIAGNOSIS — R45.89 DEPRESSED AFFECT: ICD-10-CM

## 2025-06-18 DIAGNOSIS — F41.9 ANXIETY: Primary | ICD-10-CM

## 2025-06-18 PROCEDURE — 90834 PSYTX W PT 45 MINUTES: CPT | Performed by: COUNSELOR

## 2025-06-23 NOTE — PSYCH
"Behavioral Health Psychotherapy Progress Note    Psychotherapy Provided: Individual Psychotherapy     1. Anxiety        2. Depressed affect        3. Social anxiety disorder        4. Bipolar II disorder (HCC)            Goals addressed in session: Goal 1     DATA: Processed with Bethanie how she continues to have trouble with stopping smoking or drinking energy drinks. We looked at how she has managed to cut down since her gall bladder Sx. She is requesting to see if there is sa therapist that does EMDR since she feels journaling is not helping anymore  During this session, this clinician used the following therapeutic modalities: Client-centered Therapy    Substance Abuse was not addressed during this session. If the client is diagnosed with a co-occurring substance use disorder, please indicate any changes in the frequency or amount of use: Stage of change for addressing substance use diagnoses: No substance use/Not applicable    ASSESSMENT:  Bethanie Cronin presents with a Euthymic/ normal mood.     her affect is Normal range and intensity, which is congruent, with her mood and the content of the session. The client has made progress on their goals.     Bethanie Cronin presents with a none risk of suicide, none risk of self-harm, and none risk of harm to others.    For any risk assessment that surpasses a \"low\" rating, a safety plan must be developed.    A safety plan was indicated: no  If yes, describe in detail n/a    PLAN: Between sessions, Bethanie Cronin will cont to look at cravings and use coping skills. At the next session, the therapist will use Client-centered Therapy to address self care.    Behavioral Health Treatment Plan and Discharge Planning: Bethanie Cronin is aware of and agrees to continue to work on their treatment plan. They have identified and are working toward their discharge goals. yes    Depression Follow-up Plan Completed: No    Visit start and stop times:    06/23/25  Start Time: " 1544  Stop Time: 1632  Total Visit Time: 48 minutes

## 2025-07-02 ENCOUNTER — ANESTHESIA EVENT (OUTPATIENT)
Dept: ANESTHESIOLOGY | Facility: HOSPITAL | Age: 20
End: 2025-07-02

## 2025-07-02 ENCOUNTER — ANESTHESIA (OUTPATIENT)
Dept: ANESTHESIOLOGY | Facility: HOSPITAL | Age: 20
End: 2025-07-02

## 2025-07-17 ENCOUNTER — TELEMEDICINE (OUTPATIENT)
Dept: PSYCHIATRY | Facility: CLINIC | Age: 20
End: 2025-07-17
Payer: COMMERCIAL

## 2025-07-17 DIAGNOSIS — F41.0 GENERALIZED ANXIETY DISORDER WITH PANIC ATTACKS: ICD-10-CM

## 2025-07-17 DIAGNOSIS — F31.81 BIPOLAR II DISORDER (HCC): Primary | ICD-10-CM

## 2025-07-17 DIAGNOSIS — F41.1 GENERALIZED ANXIETY DISORDER WITH PANIC ATTACKS: ICD-10-CM

## 2025-07-17 PROCEDURE — 99214 OFFICE O/P EST MOD 30 MIN: CPT | Performed by: PSYCHIATRY & NEUROLOGY

## 2025-07-31 ENCOUNTER — SOCIAL WORK (OUTPATIENT)
Dept: BEHAVIORAL/MENTAL HEALTH CLINIC | Facility: CLINIC | Age: 20
End: 2025-07-31
Payer: COMMERCIAL

## 2025-07-31 DIAGNOSIS — F31.81 BIPOLAR II DISORDER (HCC): ICD-10-CM

## 2025-07-31 DIAGNOSIS — F33.3 SEVERE EPISODE OF RECURRENT MAJOR DEPRESSIVE DISORDER, WITH PSYCHOTIC FEATURES (HCC): ICD-10-CM

## 2025-07-31 DIAGNOSIS — F41.9 ANXIETY: Primary | ICD-10-CM

## 2025-07-31 DIAGNOSIS — F40.10 SOCIAL ANXIETY DISORDER: ICD-10-CM

## 2025-07-31 PROCEDURE — 90832 PSYTX W PT 30 MINUTES: CPT | Performed by: COUNSELOR

## 2025-08-18 ENCOUNTER — TELEPHONE (OUTPATIENT)
Age: 20
End: 2025-08-18

## 2025-08-18 DIAGNOSIS — E53.8 CYANOCOBALAMIN DEFICIENCY: Primary | ICD-10-CM

## 2025-08-18 DIAGNOSIS — D50.8 OTHER IRON DEFICIENCY ANEMIA: ICD-10-CM

## 2025-08-21 ENCOUNTER — TELEPHONE (OUTPATIENT)
Age: 20
End: 2025-08-21

## (undated) DEVICE — GLOVE SRG BIOGEL 7

## (undated) DEVICE — REM POLYHESIVE ADULT PATIENT RETURN ELECTRODE: Brand: VALLEYLAB

## (undated) DEVICE — TROCARS: Brand: KII® BALLOON BLUNT TIP SYSTEM

## (undated) DEVICE — GLOVE INDICATOR PI UNDERGLOVE SZ 7.5 BLUE

## (undated) DEVICE — PACK PBDS LAP CHOLE RF

## (undated) DEVICE — LAPAROSCOPIC WIRE J-HOOK ELECTRODE COATED: Brand: CLEANCOAT

## (undated) DEVICE — ADHESIVE SKIN HIGH VISCOSITY EXOFIN 1ML

## (undated) DEVICE — NEEDLE 25G X 1 1/2

## (undated) DEVICE — TROCAR: Brand: KII FIOS FIRST ENTRY

## (undated) DEVICE — PENCILETTE PUSH BUTTON COATED

## (undated) DEVICE — INTENDED FOR TISSUE SEPARATION, AND OTHER PROCEDURES THAT REQUIRE A SHARP SURGICAL BLADE TO PUNCTURE OR CUT.: Brand: BARD-PARKER SAFETY BLADES SIZE 11, STERILE

## (undated) DEVICE — TROCAR: Brand: KII® SLEEVE

## (undated) DEVICE — SUT MONOCRYL 4-0 PS-2 18 IN Y496G

## (undated) DEVICE — CLIP APPLIER WITH CLIP LOGIC TECHNOLOGY: Brand: ENDO CLIP III

## (undated) DEVICE — TISSUE RETRIEVAL SYSTEM: Brand: INZII RETRIEVAL SYSTEM

## (undated) DEVICE — SUT VICRYL PLUS 0 UR-6 27IN VCP603H